# Patient Record
Sex: MALE | Race: WHITE | NOT HISPANIC OR LATINO | Employment: OTHER | URBAN - METROPOLITAN AREA
[De-identification: names, ages, dates, MRNs, and addresses within clinical notes are randomized per-mention and may not be internally consistent; named-entity substitution may affect disease eponyms.]

---

## 2017-06-30 ENCOUNTER — ALLSCRIPTS OFFICE VISIT (OUTPATIENT)
Dept: OTHER | Facility: OTHER | Age: 52
End: 2017-06-30

## 2017-09-16 ENCOUNTER — GENERIC CONVERSION - ENCOUNTER (OUTPATIENT)
Dept: OTHER | Facility: OTHER | Age: 52
End: 2017-09-16

## 2017-09-16 DIAGNOSIS — I10 ESSENTIAL (PRIMARY) HYPERTENSION: ICD-10-CM

## 2017-11-17 ENCOUNTER — GENERIC CONVERSION - ENCOUNTER (OUTPATIENT)
Dept: OTHER | Facility: OTHER | Age: 52
End: 2017-11-17

## 2017-11-17 DIAGNOSIS — Z12.5 ENCOUNTER FOR SCREENING FOR MALIGNANT NEOPLASM OF PROSTATE: ICD-10-CM

## 2018-01-13 VITALS
BODY MASS INDEX: 30.7 KG/M2 | OXYGEN SATURATION: 97 % | TEMPERATURE: 98.3 F | DIASTOLIC BLOOD PRESSURE: 100 MMHG | HEIGHT: 66 IN | SYSTOLIC BLOOD PRESSURE: 126 MMHG | WEIGHT: 191 LBS | RESPIRATION RATE: 16 BRPM | HEART RATE: 63 BPM

## 2018-01-22 VITALS
HEIGHT: 66 IN | SYSTOLIC BLOOD PRESSURE: 132 MMHG | HEART RATE: 68 BPM | TEMPERATURE: 97.5 F | OXYGEN SATURATION: 97 % | WEIGHT: 189 LBS | DIASTOLIC BLOOD PRESSURE: 98 MMHG | RESPIRATION RATE: 16 BRPM | BODY MASS INDEX: 30.37 KG/M2

## 2018-01-22 VITALS
BODY MASS INDEX: 31.5 KG/M2 | OXYGEN SATURATION: 96 % | RESPIRATION RATE: 16 BRPM | WEIGHT: 196 LBS | SYSTOLIC BLOOD PRESSURE: 120 MMHG | HEIGHT: 66 IN | DIASTOLIC BLOOD PRESSURE: 82 MMHG | HEART RATE: 68 BPM | TEMPERATURE: 97.4 F

## 2018-01-26 ENCOUNTER — OFFICE VISIT (OUTPATIENT)
Dept: FAMILY MEDICINE CLINIC | Facility: CLINIC | Age: 53
End: 2018-01-26
Payer: COMMERCIAL

## 2018-01-26 VITALS
RESPIRATION RATE: 16 BRPM | TEMPERATURE: 98 F | SYSTOLIC BLOOD PRESSURE: 126 MMHG | WEIGHT: 198 LBS | DIASTOLIC BLOOD PRESSURE: 88 MMHG | OXYGEN SATURATION: 97 % | BODY MASS INDEX: 31.08 KG/M2 | HEART RATE: 64 BPM | HEIGHT: 67 IN

## 2018-01-26 DIAGNOSIS — S46.011A ROTATOR CUFF STRAIN, RIGHT, INITIAL ENCOUNTER: ICD-10-CM

## 2018-01-26 DIAGNOSIS — I10 BENIGN ESSENTIAL HTN: ICD-10-CM

## 2018-01-26 DIAGNOSIS — E78.5 DYSLIPIDEMIA: ICD-10-CM

## 2018-01-26 DIAGNOSIS — Z12.5 SCREENING FOR PROSTATE CANCER: Primary | ICD-10-CM

## 2018-01-26 PROBLEM — G43.019 INTRACTABLE MIGRAINE WITHOUT AURA AND WITHOUT STATUS MIGRAINOSUS: Status: ACTIVE | Noted: 2018-01-26

## 2018-01-26 PROCEDURE — 99213 OFFICE O/P EST LOW 20 MIN: CPT | Performed by: INTERNAL MEDICINE

## 2018-01-26 RX ORDER — MULTIVIT-MIN/IRON/FOLIC ACID/K 18-600-40
500 CAPSULE ORAL
COMMUNITY

## 2018-01-26 RX ORDER — LISINOPRIL 20 MG/1
1 TABLET ORAL DAILY
COMMUNITY
End: 2018-03-07 | Stop reason: SDUPTHER

## 2018-01-26 RX ORDER — AMLODIPINE BESYLATE 10 MG/1
1 TABLET ORAL DAILY
COMMUNITY
End: 2018-03-07 | Stop reason: SDUPTHER

## 2018-01-26 RX ORDER — MELOXICAM 15 MG/1
15 TABLET ORAL DAILY
Qty: 30 TABLET | Refills: 1 | Status: SHIPPED | OUTPATIENT
Start: 2018-01-26 | End: 2018-03-31 | Stop reason: SDUPTHER

## 2018-01-26 RX ORDER — ASPIRIN 325 MG
81 TABLET ORAL
COMMUNITY

## 2018-01-26 NOTE — PROGRESS NOTES
Assessment/Plan:         Diagnoses and all orders for this visit:    Screening for prostate cancer    Benign essential HTN    Dyslipidemia    Rotator cuff strain, right, initial encounter    Other orders  -     Cancel: PSA, total and free; Future  -     amLODIPine (NORVASC) 10 mg tablet; Take 1 tablet by mouth daily  -     aspirin 325 mg tablet; Take by mouth  -     lisinopril (ZESTRIL) 20 mg tablet; Take 1 tablet by mouth daily  -     Pediatric Multivitamins-Fl (MULTIVITAMINS/FL PO); Take by mouth  -     Ascorbic Acid (VITAMIN C) 500 MG CAPS; Take by mouth          Subjective:      Patient ID: Connor Pedraza is a 46 y o  male  Pt relates his shoulder hurts for years but it has escalated over the last 2 months  It even hurts when he is washing in the shower - it is his rt shoulder  Denies pain  It hurts with abduction/adduction        The following portions of the patient's history were reviewed and updated as appropriate: allergies, current medications, past family history, past medical history, past social history, past surgical history and problem list     Review of Systems   Constitutional: Negative  HENT: Negative  Respiratory: Negative  Cardiovascular: Negative  Gastrointestinal: Negative  Objective:     Physical Exam   Constitutional: He appears well-developed and well-nourished  HENT:   Head: Normocephalic and atraumatic  Neck: Normal range of motion  Neck supple  Musculoskeletal:   Able to abduct 90 degrees    Painful adduction +tend over bisceps tendon

## 2018-02-16 ENCOUNTER — APPOINTMENT (OUTPATIENT)
Dept: RADIOLOGY | Facility: CLINIC | Age: 53
End: 2018-02-16
Payer: COMMERCIAL

## 2018-02-16 ENCOUNTER — OFFICE VISIT (OUTPATIENT)
Dept: OBGYN CLINIC | Facility: CLINIC | Age: 53
End: 2018-02-16
Payer: COMMERCIAL

## 2018-02-16 VITALS
HEART RATE: 70 BPM | BODY MASS INDEX: 32.22 KG/M2 | SYSTOLIC BLOOD PRESSURE: 135 MMHG | HEIGHT: 66 IN | WEIGHT: 200.5 LBS | DIASTOLIC BLOOD PRESSURE: 90 MMHG

## 2018-02-16 DIAGNOSIS — M25.511 RIGHT SHOULDER PAIN, UNSPECIFIED CHRONICITY: ICD-10-CM

## 2018-02-16 DIAGNOSIS — M67.911 ROTATOR CUFF DISORDER, RIGHT: Primary | ICD-10-CM

## 2018-02-16 DIAGNOSIS — M54.12 CERVICAL RADICULITIS: ICD-10-CM

## 2018-02-16 DIAGNOSIS — M75.21 BICEPS TENDINITIS OF RIGHT SHOULDER: ICD-10-CM

## 2018-02-16 PROCEDURE — 99203 OFFICE O/P NEW LOW 30 MIN: CPT | Performed by: ORTHOPAEDIC SURGERY

## 2018-02-16 PROCEDURE — 20610 DRAIN/INJ JOINT/BURSA W/O US: CPT | Performed by: ORTHOPAEDIC SURGERY

## 2018-02-16 PROCEDURE — 73030 X-RAY EXAM OF SHOULDER: CPT

## 2018-02-16 RX ADMIN — METHYLPREDNISOLONE ACETATE 1 ML: 40 INJECTION, SUSPENSION INTRA-ARTICULAR; INTRALESIONAL; INTRAMUSCULAR; SOFT TISSUE at 10:39

## 2018-02-16 RX ADMIN — BUPIVACAINE HYDROCHLORIDE 2 ML: 2.5 INJECTION, SOLUTION INFILTRATION; PERINEURAL at 10:39

## 2018-02-16 RX ADMIN — LIDOCAINE HYDROCHLORIDE 2 ML: 10 INJECTION, SOLUTION INFILTRATION; PERINEURAL at 10:39

## 2018-02-16 NOTE — PROGRESS NOTES
HPI:  Patient is a 46y o  year old   Right hand dominant male who presents with chief complaint of Shoulder Pain  Patient complains of right shoulder pain  The symptoms began  approximately 1 year ago but they have been much worse over the last couple of months    Aggravating factors: no known event  Pain is located   Both anteriorly over the bicipital groove and lesser tuberosity and laterally over the greater tuberosity to a lesser degree  No AC joint tenderness  No posterior tenderness  No glenohumeral tenderness    Discomfort is described as aching and sharp/stabbing  Symptoms are exacerbated by overhead movements and lying on the shoulder  Evaluation to date:   Patient has seen his primary care physician who prescribed Mobic that helps a little    Therapy to date includes: avoidance of offending activity and prescription NSAIDS which are not very effective  Patient does also report a few week history of some pain radiating from the neck into the shoulder down the upper extremity into the right thumb  This is episodic and appears to be related to his sleeping position  He reports that he changed sleeping positions to accommodate a new dog      ROS:   General: No fever, no chills, no weight loss, no weight gain  HEENT:  No loss of hearing, no nose bleeds, no sore throat  Eyes:  No eye pain, no red eyes, no visual disturbance  Respiratory:  No cough, no shortness of breath, no wheezing  Cardiovascular:  No chest pain, no palpitations, no edema  GI: No abdominal pain, no nausea, no vomiting  Endocrine: No frequent urination, no excessive thirst  Urinary:  No dysuria, no hematuria, no incontinence  Musculoskeletal: see HPI   Skin:  No rash, no wounds  Neurological:  No dizziness, no headache, positive numbness  Psychiatric:  No difficulty concentrating, no depression, no suicide thoughts, no anxiety  Review of all other systems is negative    PMH:  Past Medical History:   Diagnosis Date    Hyperlipidemia     Hypertension     Migraines     Varicella        PSH:  Past Surgical History:   Procedure Laterality Date    COLONOSCOPY      [employer] 6/30/2017    HERNIA REPAIR      SHOULDER SURGERY Left        Medications:  Current Outpatient Prescriptions   Medication Sig Dispense Refill    amLODIPine (NORVASC) 10 mg tablet Take 1 tablet by mouth daily      Ascorbic Acid (VITAMIN C) 500 MG CAPS Take by mouth      aspirin 325 mg tablet Take by mouth      lisinopril (ZESTRIL) 20 mg tablet Take 1 tablet by mouth daily      meloxicam (MOBIC) 15 mg tablet Take 1 tablet by mouth daily 30 tablet 1    Pediatric Multivitamins-Fl (MULTIVITAMINS/FL PO) Take by mouth       No current facility-administered medications for this visit  Family History:  Family History   Problem Relation Age of Onset    Rheum arthritis Father     Hypertension Father     Hyperlipidemia Father     Breast cancer Sister     Other Son      Neuroblastoma    Hyperlipidemia Family     Hearing loss Mother        Social History:  Social History     Occupational History    Not on file  Social History Main Topics    Smoking status: Never Smoker    Smokeless tobacco: Never Used    Alcohol use No      Comment: occasional    Drug use: No    Sexual activity: Not on file       Physical Exam:  General :  Alert, cooperative, no distress, appears stated age  Blood pressure 135/90, pulse 70, height 5' 6" (1 676 m), weight 90 9 kg (200 lb 8 oz)  Head:  Normocephalic, without obvious abnormality, atraumatic   Eyes:  PERRL, conjunctiva/corneas clear, EOM's intact,   Ears: Both ears normal appearance, no hearing deficits      Nose: Nares normal, septum midline, mucosa normal, no drainage or sinus tenderness   Neck: Supple, symmetrical, trachea midline, no adenopathy, thyroid: not enlarged, symmetric, no tenderness/mass/nodules   Back:   Symmetric, no curvature, ROM normal, no CVA tenderness   Lungs:   Respirations unlabored   Chest Wall:  No tenderness or deformity   Heart:  Regular rate and rhythm   Extremities: Extremities normal, atraumatic, no cyanosis or edema   Pulses: 2+ and symmetric   Skin: Skin color, texture, turgor normal, no rashes or lesions   Neurologic: Normal     Right shoulder:     Patient has full active range of motion but it is very painful  Pain with abduction greater than 60  Pain with forward flexion greater than 90  Positive Breaux and impingement test   Positive speed's test   He has pain with abduction against resistance and discomfort with external rotation against resistance consistent with supraspinatus involvement  However he also has pain with internal rotation against resistant consistent with subscapularis  Involvement  Lift-off test was negative    load shift testing was negative  Apprehension test negative  Negative sulcus sign  No AC joint tenderness  No glenohumeral tenderness  Cervical spine exam exam reveals full range of motion  No vertebral point tenderness  No paraspinal spasm  Deep tendon reflexes symmetric    sensations intact to light touch in office but patient states he gets episodic paresthesias in the right thumb and down the right arm  He describes a  C6 type distribution  Shoulder motor exam is  5/5 Shoulder ER, 4/5 Abduction, 4/5 IR    Imaging Studies: Distal motor exam is 5/5 for elbow flexion extension and wrist flexion extension finger abduction and   The following imaging studies were reviewed in office today  My findings are noted  Right shoulder x-rays 02/16/2018   normal alignment  No fracture  No significant degenerative change  Assessment  Encounter Diagnoses   Name Primary?     Right shoulder pain, unspecified chronicity     Rotator cuff disorder, right Yes    Biceps tendinitis of right shoulder     Cervical radiculitis      Large joint arthrocentesis  Date/Time: 2/16/2018 10:39 AM  Timeout: Immediately prior to procedure a time out was called to verify the correct patient, procedure, equipment, support staff and site/side marked as required   Supporting Documentation  Indications: pain   Procedure Details  Location: shoulder - R subacromial bursa  Preparation: Patient was prepped and draped in the usual sterile fashion  Needle size: 25 G  Ultrasound guidance: no  Approach: posterolateral  Medications administered: 2 mL bupivacaine 0 25 %; 2 mL lidocaine 1 %; 1 mL methylPREDNISolone acetate 40 mg/mL    Patient tolerance: patient tolerated the procedure well with no immediate complications  Dressing:  Sterile dressing applied          Plan: We are going to start patient on a course of physical therapy  We gave him a Depo-Medrol injection today  He can continue with the Mobic on a p r n  Basis  He will follow up in 4 weeks and at that time we will determine if an MRI is appropriate

## 2018-02-18 RX ORDER — LIDOCAINE HYDROCHLORIDE 10 MG/ML
2 INJECTION, SOLUTION INFILTRATION; PERINEURAL
Status: COMPLETED | OUTPATIENT
Start: 2018-02-16 | End: 2018-02-16

## 2018-02-18 RX ORDER — METHYLPREDNISOLONE ACETATE 40 MG/ML
1 INJECTION, SUSPENSION INTRA-ARTICULAR; INTRALESIONAL; INTRAMUSCULAR; SOFT TISSUE
Status: COMPLETED | OUTPATIENT
Start: 2018-02-16 | End: 2018-02-16

## 2018-02-18 RX ORDER — BUPIVACAINE HYDROCHLORIDE 2.5 MG/ML
2 INJECTION, SOLUTION INFILTRATION; PERINEURAL
Status: COMPLETED | OUTPATIENT
Start: 2018-02-16 | End: 2018-02-16

## 2018-03-06 ENCOUNTER — APPOINTMENT (OUTPATIENT)
Dept: LAB | Facility: MEDICAL CENTER | Age: 53
End: 2018-03-06
Payer: COMMERCIAL

## 2018-03-06 DIAGNOSIS — Z12.5 ENCOUNTER FOR SCREENING FOR MALIGNANT NEOPLASM OF PROSTATE: ICD-10-CM

## 2018-03-06 DIAGNOSIS — I10 ESSENTIAL (PRIMARY) HYPERTENSION: ICD-10-CM

## 2018-03-06 LAB
ALBUMIN SERPL BCP-MCNC: 3.8 G/DL (ref 3.5–5)
ALP SERPL-CCNC: 101 U/L (ref 46–116)
ALT SERPL W P-5'-P-CCNC: 14 U/L (ref 12–78)
ANION GAP SERPL CALCULATED.3IONS-SCNC: 4 MMOL/L (ref 4–13)
AST SERPL W P-5'-P-CCNC: 23 U/L (ref 5–45)
BASOPHILS # BLD AUTO: 0.04 THOUSANDS/ΜL (ref 0–0.1)
BASOPHILS NFR BLD AUTO: 1 % (ref 0–1)
BILIRUB SERPL-MCNC: 0.54 MG/DL (ref 0.2–1)
BUN SERPL-MCNC: 22 MG/DL (ref 5–25)
CALCIUM SERPL-MCNC: 8.7 MG/DL (ref 8.3–10.1)
CHLORIDE SERPL-SCNC: 109 MMOL/L (ref 100–108)
CHOLEST SERPL-MCNC: 187 MG/DL (ref 50–200)
CO2 SERPL-SCNC: 28 MMOL/L (ref 21–32)
CREAT SERPL-MCNC: 1.32 MG/DL (ref 0.6–1.3)
EOSINOPHIL # BLD AUTO: 0.22 THOUSAND/ΜL (ref 0–0.61)
EOSINOPHIL NFR BLD AUTO: 5 % (ref 0–6)
ERYTHROCYTE [DISTWIDTH] IN BLOOD BY AUTOMATED COUNT: 13.5 % (ref 11.6–15.1)
GFR SERPL CREATININE-BSD FRML MDRD: 62 ML/MIN/1.73SQ M
GLUCOSE P FAST SERPL-MCNC: 75 MG/DL (ref 65–99)
HCT VFR BLD AUTO: 47.1 % (ref 36.5–49.3)
HDLC SERPL-MCNC: 44 MG/DL (ref 40–60)
HGB BLD-MCNC: 16.4 G/DL (ref 12–17)
LDLC SERPL CALC-MCNC: 119 MG/DL (ref 0–100)
LYMPHOCYTES # BLD AUTO: 1.26 THOUSANDS/ΜL (ref 0.6–4.47)
LYMPHOCYTES NFR BLD AUTO: 29 % (ref 14–44)
MCH RBC QN AUTO: 31.4 PG (ref 26.8–34.3)
MCHC RBC AUTO-ENTMCNC: 34.8 G/DL (ref 31.4–37.4)
MCV RBC AUTO: 90 FL (ref 82–98)
MONOCYTES # BLD AUTO: 0.51 THOUSAND/ΜL (ref 0.17–1.22)
MONOCYTES NFR BLD AUTO: 12 % (ref 4–12)
NEUTROPHILS # BLD AUTO: 2.27 THOUSANDS/ΜL (ref 1.85–7.62)
NEUTS SEG NFR BLD AUTO: 53 % (ref 43–75)
NRBC BLD AUTO-RTO: 0 /100 WBCS
PLATELET # BLD AUTO: 168 THOUSANDS/UL (ref 149–390)
PMV BLD AUTO: 12.1 FL (ref 8.9–12.7)
POTASSIUM SERPL-SCNC: 4.3 MMOL/L (ref 3.5–5.3)
PROT SERPL-MCNC: 7.5 G/DL (ref 6.4–8.2)
PSA SERPL-MCNC: 0.6 NG/ML (ref 0–4)
RBC # BLD AUTO: 5.23 MILLION/UL (ref 3.88–5.62)
SODIUM SERPL-SCNC: 141 MMOL/L (ref 136–145)
T4 FREE SERPL-MCNC: 0.96 NG/DL (ref 0.76–1.46)
TRIGL SERPL-MCNC: 120 MG/DL
TSH SERPL DL<=0.05 MIU/L-ACNC: 1.12 UIU/ML (ref 0.36–3.74)
WBC # BLD AUTO: 4.31 THOUSAND/UL (ref 4.31–10.16)

## 2018-03-06 PROCEDURE — 85025 COMPLETE CBC W/AUTO DIFF WBC: CPT

## 2018-03-06 PROCEDURE — 36415 COLL VENOUS BLD VENIPUNCTURE: CPT

## 2018-03-06 PROCEDURE — 80053 COMPREHEN METABOLIC PANEL: CPT

## 2018-03-06 PROCEDURE — 80061 LIPID PANEL: CPT

## 2018-03-06 PROCEDURE — 84439 ASSAY OF FREE THYROXINE: CPT

## 2018-03-06 PROCEDURE — G0103 PSA SCREENING: HCPCS

## 2018-03-06 PROCEDURE — 84443 ASSAY THYROID STIM HORMONE: CPT

## 2018-03-07 DIAGNOSIS — I10 HYPERTENSION, UNSPECIFIED TYPE: Primary | ICD-10-CM

## 2018-03-07 RX ORDER — LISINOPRIL 20 MG/1
TABLET ORAL
Qty: 90 TABLET | Refills: 1 | Status: SHIPPED | OUTPATIENT
Start: 2018-03-07 | End: 2018-05-08

## 2018-03-07 RX ORDER — AMLODIPINE BESYLATE 10 MG/1
TABLET ORAL
Qty: 90 TABLET | Refills: 1 | Status: SHIPPED | OUTPATIENT
Start: 2018-03-07 | End: 2018-07-25 | Stop reason: SDUPTHER

## 2018-03-08 ENCOUNTER — EVALUATION (OUTPATIENT)
Dept: PHYSICAL THERAPY | Facility: CLINIC | Age: 53
End: 2018-03-08
Payer: COMMERCIAL

## 2018-03-08 DIAGNOSIS — M75.21 BICEPS TENDINITIS OF RIGHT SHOULDER: ICD-10-CM

## 2018-03-08 DIAGNOSIS — M25.511 RIGHT SHOULDER PAIN, UNSPECIFIED CHRONICITY: Primary | ICD-10-CM

## 2018-03-08 DIAGNOSIS — M67.911 ROTATOR CUFF DISORDER, RIGHT: ICD-10-CM

## 2018-03-08 PROCEDURE — 97110 THERAPEUTIC EXERCISES: CPT | Performed by: PHYSICAL THERAPIST

## 2018-03-08 PROCEDURE — G8990 OTHER PT/OT CURRENT STATUS: HCPCS | Performed by: PHYSICAL THERAPIST

## 2018-03-08 PROCEDURE — 97162 PT EVAL MOD COMPLEX 30 MIN: CPT | Performed by: PHYSICAL THERAPIST

## 2018-03-08 PROCEDURE — G8991 OTHER PT/OT GOAL STATUS: HCPCS | Performed by: PHYSICAL THERAPIST

## 2018-03-08 NOTE — PROGRESS NOTES
PT Evaluation     Today's date: 3/8/2018  Patient name: Luke Pyle  : 1965  MRN: [id number]  Referring provider: Judah Prado MD  Dx:   Encounter Diagnosis     ICD-10-CM    1  Right shoulder pain, unspecified chronicity M25 511 Ambulatory referral to Physical Therapy   2  Rotator cuff disorder, right M67 911 Ambulatory referral to Physical Therapy   3  Biceps tendinitis of right shoulder M75 21 Ambulatory referral to Physical Therapy                  Assessment  Impairments: abnormal or restricted ROM, activity intolerance, impaired physical strength, lacks appropriate home exercise program and pain with function    Assessment details: Luke Pyle is a 46year old male presenting to physical therapy with chief complaints of R shoulder pain  No further referral appears necessary at this time  Patient demonsrPatient was provided a home exercise program and demonstrated an understanding of exercises  Patient was advised to stop performing home exercise program if symptoms increase or new complaints developed  Verbal understanding demonstrated regarding home exercise program instructions  Patient would benefit from skilled physical therapy services for prescribed exercises, manual interventions, neuromuscular re-education, education, and modalities as deemed appropriate to assist patient in achieving their maximum level of function  Understanding of Dx/Px/POC: good   Prognosis: good    Goals  STG  1  Decrease pain by at least two subjective ratings in 4 weeks  2  Increase ROM by at least 5 degrees in 4 weeks  LTG  1  Independent with HEP  2  In 8 weeks patient will be able to reach overhead without limitations  3  In 8 weeks patient will be able to lift a gallon of milk without limitations  4    In 8 weeks patient will be able to complete household chores without limitations      Plan  Patient would benefit from: skilled PT  Planned modality interventions: cryotherapy  Planned therapy interventions: flexibility, graded exercise, home exercise program, therapeutic exercise, strengthening, stretching, patient education, neuromuscular re-education, manual therapy, joint mobilization, IADL retraining, functional ROM exercises and activity modification  Frequency: 2x week  Duration in weeks: 8  Treatment plan discussed with: patient        Subjective Evaluation    History of Present Illness  Mechanism of injury: Patient reports that he has had R shoulder pain for years  He states that recently he has had difficulty reaching overhead  He also reports that if his dog bumps into his R shoulder it is also very intense  He reports that riding a bicycle is also painful  X-rays were taken of the R shoulder which he reports were negative  He states that orthopedics advised him to come to physical therapy first before going any other route  He did have an injection to the R shoulder which provided some relief for a couple of hours but then his pain returned  Denies crepitus  Patient is RHD  Pain is also reported when trying to sleep, if he rolls onto the affected extremity that will occasionally wake him  Pain  Current pain ratin  At best pain ratin  At worst pain ratin  Quality: knife-like    Hand dominance: right      Diagnostic Tests  X-ray: normal        Objective     Tenderness     Left Shoulder   Tenderness in the acromion and biceps tendon (proximal)       Active Range of Motion     Right Shoulder   Flexion: 130 degrees   Abduction: 80 degrees   External rotation BTH: C2     Additional Active Range of Motion Details   IR Behind the back motion is to the hip     Passive Range of Motion   Left Shoulder   Extension: with pain    Right Shoulder   Flexion: 130 degrees   Abduction: 90 degrees   External rotation 90°: 35 degrees   Internal rotation 90°: 15 degrees     Scapular Mobility     Right Shoulder   Scapular Dyskinesis: grade I    Strength/Myotome Testing     Right Shoulder Planes of Motion   Flexion: 4   Abduction: 3-   External rotation at 0°: 3-   Internal rotation at 0°: 4     Tests     Right Shoulder   Positive belly press, empty can, Hawkin's, painful arc, Andrew's and scapular assistance              Daily Treatment Diary     Manual  3 8            GH PROM             GH mobilizations grade III 5 mins                                                       Exercise Diary  3 8            Pulleys             RTC isometrics 5"x10            SL ER 20x            Scap punches              scap wall slides             Prone row, extension             TB IR/ER             Scap 5 (no robbery)             Posterior capsule stretch 10"x10                                                                                                                                                               Modalities

## 2018-03-14 ENCOUNTER — OFFICE VISIT (OUTPATIENT)
Dept: PHYSICAL THERAPY | Facility: CLINIC | Age: 53
End: 2018-03-14
Payer: COMMERCIAL

## 2018-03-14 DIAGNOSIS — M67.911 ROTATOR CUFF DISORDER, RIGHT: ICD-10-CM

## 2018-03-14 DIAGNOSIS — M75.21 BICEPS TENDINITIS OF RIGHT SHOULDER: ICD-10-CM

## 2018-03-14 DIAGNOSIS — M25.511 RIGHT SHOULDER PAIN, UNSPECIFIED CHRONICITY: Primary | ICD-10-CM

## 2018-03-14 PROCEDURE — 97112 NEUROMUSCULAR REEDUCATION: CPT

## 2018-03-14 PROCEDURE — 97110 THERAPEUTIC EXERCISES: CPT

## 2018-03-14 PROCEDURE — 97140 MANUAL THERAPY 1/> REGIONS: CPT

## 2018-03-14 NOTE — PROGRESS NOTES
Daily Note     Today's date: 3/14/2018  Patient name: Alina Kelley  : 1965  MRN: [id number]  Referring provider: Angie Saldana MD  Dx:   Encounter Diagnosis     ICD-10-CM    1  Right shoulder pain, unspecified chronicity M25 511    2  Rotator cuff disorder, right M67 911    3  Biceps tendinitis of right shoulder M75 21                   Subjective: Pt reports feeling worse after performing exercise at home upon arrival   Pt reports 4/10 R shoulder pain  Objective: See treatment diary below  Daily Treatment Diary     Manual  3 8 3/14           GH PROM  10 min           GH mobilizations grade III 5 mins                                                       Exercise Diary  3 8 3/14           Pulleys  3'           RTC isometrics 5"x10 5"x10           SL ER 20x x20           Scap punches   x20           scap wall slides  2x10           Prone row, extension  2x10           TB IR/ER  rtb x20           Scap 5 (no robbery)  rtb x20 ea           Posterior capsule stretch 10"x10 :10x10                                                                                                                                                              Modalities   3/14           CP R shoulder seated  x10 min                                             Assessment: PROM limited in all planes secondary to pain  Patient able to complete all exercise within pain free ROM today  Patient reports improvement in R shoulder symptoms post session and after ice  Pt instructed to perform all ex at home within pain free ROM  Plan: Continue per plan of care

## 2018-03-15 ENCOUNTER — OFFICE VISIT (OUTPATIENT)
Dept: PHYSICAL THERAPY | Facility: CLINIC | Age: 53
End: 2018-03-15
Payer: COMMERCIAL

## 2018-03-15 DIAGNOSIS — M75.21 BICEPS TENDINITIS OF RIGHT SHOULDER: ICD-10-CM

## 2018-03-15 DIAGNOSIS — M67.911 ROTATOR CUFF DISORDER, RIGHT: ICD-10-CM

## 2018-03-15 DIAGNOSIS — M25.511 RIGHT SHOULDER PAIN, UNSPECIFIED CHRONICITY: Primary | ICD-10-CM

## 2018-03-15 PROCEDURE — 97112 NEUROMUSCULAR REEDUCATION: CPT | Performed by: PHYSICAL THERAPIST

## 2018-03-15 PROCEDURE — 97140 MANUAL THERAPY 1/> REGIONS: CPT | Performed by: PHYSICAL THERAPIST

## 2018-03-15 NOTE — PROGRESS NOTES
Daily Note     Today's date: 3/15/2018  Patient name: Reny Ahuja  : 1965  MRN: [id number]  Referring provider: Verenice Massey MD  Dx:   Encounter Diagnosis     ICD-10-CM    1  Right shoulder pain, unspecified chronicity M25 511    2  Rotator cuff disorder, right M67 911    3  Biceps tendinitis of right shoulder M75 21                   Subjective: Reports compliance with his HEP thus far  some mild soreness today      Objective: See treatment diary below  Daily Treatment Diary     Manual  3 8 3/14 3 15          GH PROM  10 min 8 min          GH mobilizations grade III 5 mins  2 min                                                     Exercise Diary  3 8 3/14 3 15          Pulleys  3' 4 min          RTC isometrics 5"x10 5"x10 5"x10ea          SL ER 20x x20 20x          Scap punches   x20 20x 2#          scap wall slides  2x10 30x          Prone row, extension  2x10 2x10 2#          TB IR/ER  rtb x20 20x          Scap 5 (no robbery)  rtb x20 ea 20ea          Posterior capsule stretch 10"x10 :10x10 10"x10          Supine cane flex/ ER   10"x10          LT ER   rtb 2x10                                                                                                                                   Modalities   3/14           CP R shoulder seated  x10 min                                             Assessment: ER based motions are most painful for the patient  Challenged with activities going outside the plane of the scapula  Resistance added today without complaints of increased pain  ROM remains limited in all planes becaues of pain  Plan: Continue per plan of care

## 2018-03-16 ENCOUNTER — OFFICE VISIT (OUTPATIENT)
Dept: OBGYN CLINIC | Facility: CLINIC | Age: 53
End: 2018-03-16
Payer: COMMERCIAL

## 2018-03-16 VITALS
DIASTOLIC BLOOD PRESSURE: 87 MMHG | BODY MASS INDEX: 31.5 KG/M2 | SYSTOLIC BLOOD PRESSURE: 118 MMHG | HEART RATE: 66 BPM | WEIGHT: 196 LBS | HEIGHT: 66 IN

## 2018-03-16 DIAGNOSIS — M75.41 IMPINGEMENT SYNDROME OF RIGHT SHOULDER: ICD-10-CM

## 2018-03-16 DIAGNOSIS — M67.911 ROTATOR CUFF DISORDER, RIGHT: Primary | ICD-10-CM

## 2018-03-16 DIAGNOSIS — M77.8 TENDINITIS OF RIGHT SHOULDER: ICD-10-CM

## 2018-03-16 PROCEDURE — 99214 OFFICE O/P EST MOD 30 MIN: CPT | Performed by: ORTHOPAEDIC SURGERY

## 2018-03-16 NOTE — PROGRESS NOTES
Chief Complaint   Patient presents with    Right Shoulder - Follow-up         Subjective   Patient enters today follow-up right shoulder pain  Patient has had shoulder symptoms for over one year which progressively became worse over the last few months  Patient had cortisone injection last visit and has been attending physical therapy with no real improvement  Patient still has pain with overhead activities  He has decreased range of motion  Denies any numbness or tingling in the upper extremity  Patient complains of most of his pain in the anterior aspect of the shoulder and at times the superior posterior aspect of the shoulder  ROS:   General: no fever, no chills  Respiratory:  No coughing, shortness of breath or wheezing  Cardiovascular:  No chest pain, no palpitations  GI:  Abdomen soft nontender, denies nausea  Endocrine:  No muscle weakness, no frequent urination, no excessive thirst  Urinary:  No dysuria, no incontinence  Musculoskeletal: see HPI and PE  SKIN:  No skin rash, no dry skin  Neurological:  No headaches, no confusion  Psychiatric:  No suicide thoughts, no anxiety, no depression  Review of all other systems is negative    Past Medical History:   Diagnosis Date    Hyperlipidemia     Hypertension     Migraines     Varicella        Current Outpatient Prescriptions on File Prior to Visit   Medication Sig Dispense Refill    amLODIPine (NORVASC) 10 mg tablet TAKE 1 TABLET DAILY AS DIRECTED  90 tablet 1    Ascorbic Acid (VITAMIN C) 500 MG CAPS Take by mouth      aspirin 325 mg tablet Take by mouth      lisinopril (ZESTRIL) 20 mg tablet TAKE 1 TABLET DAILY 90 tablet 1    meloxicam (MOBIC) 15 mg tablet Take 1 tablet by mouth daily 30 tablet 1    Pediatric Multivitamins-Fl (MULTIVITAMINS/FL PO) Take by mouth       No current facility-administered medications on file prior to visit          No Known Allergies    Social History       General Appearance:  Alert, cooperative, no distress, appears stated age   Lungs:   respirations unlabored   Heart:  Regular rate and rhythm   Abdomen:   Soft, non-tender,  no masses   Extremities: Extremities normal, atraumatic, no cyanosis or edema   Pulses: 2+ and symmetric   Skin: Skin color, texture, turgor normal, no rashes or lesions   Neurologic: Normal     Ortho Exam   Right shoulder examination shows skin intact with no erythema noted  No visible deformities seen  Tenderness noted over the anterior aspect of the shoulder as well as the greater tuberosity  No tenderness noted over the [facility] [place] joint  No tenderness noted with palpation over the greater tuberosity  Active forward elevation to 125 degrees with pain beyond that, passive ROM is full  Active external rotation is to 60degrees and internal rotation to level of T11  Pain with resisted supraspinatus, positive speed's, positive Neer and Breaux impingement  Sensation is intact to light touch C5- T1 distributions  5/5 strength with shoulder flexion, external and internal rotation, biceps and triceps  Praful Josefsherri was seen today for follow-up  Diagnoses and all orders for this visit:    Rotator cuff disorder, right  -     MRI shoulder right wo contrast; Future    Impingement syndrome of right shoulder    Tendinitis of right shoulder          PLAN:  Patient with right shoulder pain for over one year gradually becoming worse  Patient not responding to conservative treatment of NSAIDs, cortisone injection and physical therapy  We will send patient to get right shoulder MRI to better evaluate the rotator cuff    He will follow up after the MRI to discuss findings and treatment options based on those findings

## 2018-03-20 ENCOUNTER — TRANSCRIBE ORDERS (OUTPATIENT)
Dept: LAB | Facility: CLINIC | Age: 53
End: 2018-03-20

## 2018-03-22 ENCOUNTER — HOSPITAL ENCOUNTER (OUTPATIENT)
Dept: MRI IMAGING | Facility: CLINIC | Age: 53
Discharge: HOME | End: 2018-03-22
Payer: COMMERCIAL

## 2018-03-22 DIAGNOSIS — M67.911 ROTATOR CUFF DISORDER, RIGHT: ICD-10-CM

## 2018-03-22 PROCEDURE — 73221 MRI JOINT UPR EXTREM W/O DYE: CPT

## 2018-03-28 ENCOUNTER — OFFICE VISIT (OUTPATIENT)
Dept: OBGYN CLINIC | Facility: CLINIC | Age: 53
End: 2018-03-28
Payer: COMMERCIAL

## 2018-03-28 VITALS
WEIGHT: 194.4 LBS | HEART RATE: 62 BPM | BODY MASS INDEX: 30.51 KG/M2 | SYSTOLIC BLOOD PRESSURE: 152 MMHG | HEIGHT: 67 IN | DIASTOLIC BLOOD PRESSURE: 108 MMHG

## 2018-03-28 DIAGNOSIS — S43.431A SUPERIOR GLENOID LABRUM LESION OF RIGHT SHOULDER, INITIAL ENCOUNTER: ICD-10-CM

## 2018-03-28 DIAGNOSIS — M25.511 RIGHT SHOULDER PAIN, UNSPECIFIED CHRONICITY: Primary | ICD-10-CM

## 2018-03-28 PROCEDURE — 99213 OFFICE O/P EST LOW 20 MIN: CPT | Performed by: ORTHOPAEDIC SURGERY

## 2018-03-28 NOTE — PROGRESS NOTES
Chief Complaint   Patient presents with    Right Shoulder - Follow-up         Subjective     Patient's symptoms are unchanged  He continues to have pain with overhead activity and with reaching forward  He had the MRI scan of his right shoulder performed on  March 22, 2018  This was reviewed in office today  There is no obvious rotator cuff tear  There is a possible slap tear  ROS:   General: no fever, no chills  HEENT:  No loss of hearing or eyesight problems  Eyes:  No red eyes  Respiratory:  No coughing, shortness of breath or wheezing  Cardiovascular:  No chest pain, no palpitations  GI:  Abdomen soft nontender, denies nausea  Endocrine:  No muscle weakness, no frequent urination, no excessive thirst  Urinary:  No dysuria, no incontinence  Musculoskeletal: see HPI and PE  SKIN:  No skin rash, no dry skin  Neurological:  No headaches, no confusion  Psychiatric:  No suicide thoughts, no anxiety, no depression  Review of all other systems is negative    Past Medical History:   Diagnosis Date    Hyperlipidemia     Hypertension     Migraines     Varicella        Current Outpatient Prescriptions on File Prior to Visit   Medication Sig Dispense Refill    amLODIPine (NORVASC) 10 mg tablet TAKE 1 TABLET DAILY AS DIRECTED  90 tablet 1    Ascorbic Acid (VITAMIN C) 500 MG CAPS Take by mouth      aspirin 325 mg tablet Take by mouth      lisinopril (ZESTRIL) 20 mg tablet TAKE 1 TABLET DAILY 90 tablet 1    meloxicam (MOBIC) 15 mg tablet Take 1 tablet by mouth daily 30 tablet 1    Pediatric Multivitamins-Fl (MULTIVITAMINS/FL PO) Take by mouth       No current facility-administered medications on file prior to visit          No Known Allergies    Social History       General Appearance:  Alert, cooperative, no distress, appears stated age   Lungs:   respirations unlabored   Heart:  Regular rate and rhythm   Abdomen:   Soft, non-tender,  no masses   Extremities: Extremities normal, atraumatic, no cyanosis or edema   Pulses: 2+ and symmetric   Skin: Skin color, texture, turgor normal, no rashes or lesions   Neurologic: Normal         Right Shoulder Exam     Tenderness   The patient is experiencing tenderness in the   Posterior and lateral shoulder  Range of Motion   Active Abduction:                       Normal  Passive Abduction:                    Normal  Extension:                                  Normal  Forward Flexion:                        180  External Rotation:                      90  Internal Rotation 0 degrees:      Lumbar    Muscle Strength   Abduction:            4/5  Internal Rotation:  4/5  External Rotation: 4/5  Supraspinatus:     4/5  Subscapularis:     4/5  Biceps:                 4/5    Tests   Impingement:   Positive  Cross Arm:      Negative  Drop Arm:        Negative  Apprehension: Negative  Sulcus:            Positive      Positive Speeds test  Positive Yergson's test       ASSESSMENT:    Tita Gunter was seen today for follow-up  Diagnoses and all orders for this visit:    Right shoulder pain, unspecified chronicity  -     Case request operating room: ARTHROSCOPY SHOULDER, TENODESIS BICEPS OPEN PROXIMAL, ACROMIOPLASTY, REPAIR LABRUM; Standing  -     Case request operating room: ARTHROSCOPY SHOULDER, TENODESIS BICEPS OPEN PROXIMAL, ACROMIOPLASTY, REPAIR LABRUM    Superior glenoid labrum lesion of right shoulder, initial encounter  -     Case request operating room: ARTHROSCOPY SHOULDER, TENODESIS BICEPS OPEN PROXIMAL, ACROMIOPLASTY, REPAIR LABRUM; Standing  -     Case request operating room: ARTHROSCOPY SHOULDER, TENODESIS BICEPS OPEN PROXIMAL, ACROMIOPLASTY, REPAIR LABRUM    Other orders  -     Height and weight upon arrival; Standing  -     Void on call to OR; Standing  -     Insert peripheral IV; Standing  -     ceFAZolin (ANCEF) 1,000 mg in dextrose 5 % 100 mL IVPB;  Infuse 1,000 mg into a venous catheter once           PLAN:      Diagnostic arthroscopy and possible open subpectoral biceps tenodesis if significant slap tear is noted  We will also consider labral repair and acromioplasty  I doubt any significant rotator cuff tear will be found, but if one is we will consider repair  Risks and benefits of these procedures were discussed in detail with the patient and he wished to proceed

## 2018-03-29 DIAGNOSIS — S43.431A SUPERIOR GLENOID LABRUM LESION OF RIGHT SHOULDER, INITIAL ENCOUNTER: Primary | ICD-10-CM

## 2018-03-31 DIAGNOSIS — S46.011A ROTATOR CUFF STRAIN, RIGHT, INITIAL ENCOUNTER: ICD-10-CM

## 2018-04-02 RX ORDER — MELOXICAM 15 MG/1
15 TABLET ORAL DAILY
Qty: 30 TABLET | Refills: 1 | Status: SHIPPED | OUTPATIENT
Start: 2018-04-02 | End: 2018-05-08

## 2018-04-02 NOTE — PRE-PROCEDURE INSTRUCTIONS
Pre-Surgery Instructions:   Medication Instructions    amLODIPine (NORVASC) 10 mg tablet Patient was instructed by Physician and understands   Ascorbic Acid (VITAMIN C) 500 MG CAPS Patient was instructed by Physician and understands   aspirin 325 mg tablet Patient was instructed by Physician and understands   lisinopril (ZESTRIL) 20 mg tablet Patient was instructed by Physician and understands   meloxicam (MOBIC) 15 mg tablet Patient was instructed by Physician and understands

## 2018-04-06 ENCOUNTER — OFFICE VISIT (OUTPATIENT)
Dept: FAMILY MEDICINE CLINIC | Facility: CLINIC | Age: 53
End: 2018-04-06
Payer: COMMERCIAL

## 2018-04-06 VITALS
HEART RATE: 73 BPM | SYSTOLIC BLOOD PRESSURE: 132 MMHG | OXYGEN SATURATION: 98 % | WEIGHT: 194 LBS | TEMPERATURE: 98.5 F | DIASTOLIC BLOOD PRESSURE: 100 MMHG | HEIGHT: 67 IN | BODY MASS INDEX: 30.45 KG/M2 | RESPIRATION RATE: 16 BRPM

## 2018-04-06 DIAGNOSIS — I10 HYPERTENSION, UNSPECIFIED TYPE: ICD-10-CM

## 2018-04-06 DIAGNOSIS — Z01.818 PRE-OP EXAM: Primary | ICD-10-CM

## 2018-04-06 PROCEDURE — 99243 OFF/OP CNSLTJ NEW/EST LOW 30: CPT | Performed by: INTERNAL MEDICINE

## 2018-04-06 PROCEDURE — 93000 ELECTROCARDIOGRAM COMPLETE: CPT | Performed by: INTERNAL MEDICINE

## 2018-04-06 RX ORDER — METOPROLOL SUCCINATE 50 MG/1
50 TABLET, EXTENDED RELEASE ORAL DAILY
Qty: 30 TABLET | Refills: 1 | Status: SHIPPED | OUTPATIENT
Start: 2018-04-06 | End: 2018-06-03 | Stop reason: SDUPTHER

## 2018-04-06 NOTE — PATIENT INSTRUCTIONS
His bp is not controlled  Will add b-blocker for bp control and card protection for or  Will recheck bp before or  He will take bp at night and other bp meds in am   ekg lico  He is to have preop lab  ADDENDUM:  Reviewed his lab - lico  Told him no aspirin/nsaid/ginkoba/gingsing/vit e or fish oil one week before  Asked him to take amlodipine 10mg, lisinopril 20mg and metoprolol xl 50mg am of surgery with sip of water  Repeat bp is 130/88 on new meds    He is cleared for surgery

## 2018-04-06 NOTE — PROGRESS NOTES
Assessment/Plan:         Diagnoses and all orders for this visit:    Pre-op exam  -     POCT ECG          Subjective:      Patient ID: Katalina Jean is a 46 y o  male  For surgery 4/26/18 - dr Arturo Blanca - for repair of labrium +htn - not controlled will add b-blocker and recheck before surgery  Denies dm, arrythmia, problem with gen anesthesia        The following portions of the patient's history were reviewed and updated as appropriate:   He  has a past medical history of Hyperlipidemia; Hypertension; Migraines; and Varicella  He   Patient Active Problem List    Diagnosis Date Noted    Superior glenoid labrum lesion of right shoulder 03/28/2018    Right shoulder pain 03/28/2018    Rotator cuff disorder, right 03/16/2018    Impingement syndrome of right shoulder 03/16/2018    Dyslipidemia 01/26/2018    Intractable migraine without aura and without status migrainosus 01/26/2018    Benign essential HTN 06/30/2017     He  has a past surgical history that includes Colonoscopy; Hernia repair; and Shoulder surgery (Left)  His family history includes Breast cancer in his sister; Hearing loss in his mother; Hyperlipidemia in his family and father; Hypertension in his father; Other in his son; Rheum arthritis in his father  He  reports that he has never smoked  He has never used smokeless tobacco  He reports that he drinks alcohol  He reports that he does not use drugs  Current Outpatient Prescriptions   Medication Sig Dispense Refill    amLODIPine (NORVASC) 10 mg tablet TAKE 1 TABLET DAILY AS DIRECTED  90 tablet 1    Ascorbic Acid (VITAMIN C) 500 MG CAPS Take 500 mg by mouth        aspirin 325 mg tablet Take by mouth      lisinopril (ZESTRIL) 20 mg tablet TAKE 1 TABLET DAILY 90 tablet 1    Pediatric Multivitamins-Fl (MULTIVITAMINS/FL PO) Take by mouth      meloxicam (MOBIC) 15 mg tablet TAKE 1 TABLET BY MOUTH DAILY 30 tablet 1     No current facility-administered medications for this visit        Current Outpatient Prescriptions on File Prior to Visit   Medication Sig    amLODIPine (NORVASC) 10 mg tablet TAKE 1 TABLET DAILY AS DIRECTED   Ascorbic Acid (VITAMIN C) 500 MG CAPS Take 500 mg by mouth      aspirin 325 mg tablet Take by mouth    lisinopril (ZESTRIL) 20 mg tablet TAKE 1 TABLET DAILY    Pediatric Multivitamins-Fl (MULTIVITAMINS/FL PO) Take by mouth    meloxicam (MOBIC) 15 mg tablet TAKE 1 TABLET BY MOUTH DAILY     No current facility-administered medications on file prior to visit  He has No Known Allergies       Review of Systems   Constitutional: Negative  HENT: Negative  Respiratory: Negative  Cardiovascular: Negative  Neurological: Positive for headaches  Objective:      /100 (BP Location: Left arm, Patient Position: Sitting, Cuff Size: Large)   Pulse 73   Temp 98 5 °F (36 9 °C) (Tympanic)   Resp 16   Ht 5' 7" (1 702 m)   Wt 88 kg (194 lb)   SpO2 98%   BMI 30 38 kg/m²          Physical Exam   Constitutional: He appears well-developed and well-nourished  HENT:   Head: Normocephalic and atraumatic  Neck: Normal range of motion  Neck supple  Cardiovascular: Normal rate and regular rhythm      Pulmonary/Chest: Effort normal and breath sounds normal

## 2018-04-12 ENCOUNTER — APPOINTMENT (OUTPATIENT)
Dept: LAB | Facility: HOSPITAL | Age: 53
End: 2018-04-12
Attending: ORTHOPAEDIC SURGERY
Payer: COMMERCIAL

## 2018-04-12 ENCOUNTER — HOSPITAL ENCOUNTER (OUTPATIENT)
Dept: RADIOLOGY | Facility: HOSPITAL | Age: 53
Discharge: HOME | End: 2018-04-12
Attending: ORTHOPAEDIC SURGERY
Payer: COMMERCIAL

## 2018-04-12 DIAGNOSIS — S43.431A SUPERIOR GLENOID LABRUM LESION OF RIGHT SHOULDER, INITIAL ENCOUNTER: ICD-10-CM

## 2018-04-12 LAB
ANION GAP SERPL CALCULATED.3IONS-SCNC: 8 MMOL/L (ref 4–13)
BASOPHILS # BLD AUTO: 0.05 THOUSANDS/ΜL (ref 0–0.1)
BASOPHILS NFR BLD AUTO: 1 % (ref 0–1)
BUN SERPL-MCNC: 21 MG/DL (ref 5–25)
CALCIUM SERPL-MCNC: 8.7 MG/DL
CHLORIDE SERPL-SCNC: 105 MMOL/L (ref 100–108)
CO2 SERPL-SCNC: 28 MMOL/L (ref 21–32)
CREAT SERPL-MCNC: 1.37 MG/DL (ref 0.6–1.3)
EOSINOPHIL # BLD AUTO: 0.16 THOUSAND/ΜL (ref 0–0.61)
EOSINOPHIL NFR BLD AUTO: 3 % (ref 0–6)
ERYTHROCYTE [DISTWIDTH] IN BLOOD BY AUTOMATED COUNT: 12.4 % (ref 11.6–15.1)
GFR SERPL CREATININE-BSD FRML MDRD: 59 ML/MIN/1.73SQ M
GLUCOSE P FAST SERPL-MCNC: 93 MG/DL (ref 65–99)
HCT VFR BLD AUTO: 44 % (ref 36.5–49.3)
HGB BLD-MCNC: 15.2 G/DL (ref 12–17)
INR PPP: 0.99 (ref 0.86–1.16)
LYMPHOCYTES # BLD AUTO: 1.68 THOUSANDS/ΜL (ref 0.6–4.47)
LYMPHOCYTES NFR BLD AUTO: 27 % (ref 14–44)
MCH RBC QN AUTO: 30.8 PG (ref 26.8–34.3)
MCHC RBC AUTO-ENTMCNC: 34.5 G/DL (ref 31.4–37.4)
MCV RBC AUTO: 89 FL (ref 82–98)
MONOCYTES # BLD AUTO: 0.8 THOUSAND/ΜL (ref 0.17–1.22)
MONOCYTES NFR BLD AUTO: 13 % (ref 4–12)
NEUTROPHILS # BLD AUTO: 3.53 THOUSANDS/ΜL (ref 1.85–7.62)
NEUTS SEG NFR BLD AUTO: 57 % (ref 43–75)
NRBC BLD AUTO-RTO: 0 /100 WBCS
PLATELET # BLD AUTO: 179 THOUSANDS/UL (ref 149–390)
PMV BLD AUTO: 10.5 FL (ref 8.9–12.7)
POTASSIUM SERPL-SCNC: 3.7 MMOL/L (ref 3.5–5.3)
PROTHROMBIN TIME: 13.3 SECONDS (ref 12.1–14.4)
RBC # BLD AUTO: 4.94 MILLION/UL (ref 3.88–5.62)
SODIUM SERPL-SCNC: 141 MMOL/L (ref 136–145)
WBC # BLD AUTO: 6.24 THOUSAND/UL (ref 4.31–10.16)

## 2018-04-12 PROCEDURE — 85610 PROTHROMBIN TIME: CPT

## 2018-04-12 PROCEDURE — 71046 X-RAY EXAM CHEST 2 VIEWS: CPT

## 2018-04-12 PROCEDURE — 80048 BASIC METABOLIC PNL TOTAL CA: CPT

## 2018-04-12 PROCEDURE — 36415 COLL VENOUS BLD VENIPUNCTURE: CPT

## 2018-04-12 PROCEDURE — 85025 COMPLETE CBC W/AUTO DIFF WBC: CPT

## 2018-04-17 ENCOUNTER — OFFICE VISIT (OUTPATIENT)
Dept: FAMILY MEDICINE CLINIC | Facility: CLINIC | Age: 53
End: 2018-04-17
Payer: COMMERCIAL

## 2018-04-17 VITALS
DIASTOLIC BLOOD PRESSURE: 88 MMHG | SYSTOLIC BLOOD PRESSURE: 130 MMHG | TEMPERATURE: 97.5 F | RESPIRATION RATE: 16 BRPM | OXYGEN SATURATION: 97 % | BODY MASS INDEX: 30.92 KG/M2 | HEIGHT: 67 IN | HEART RATE: 61 BPM | WEIGHT: 197 LBS

## 2018-04-17 DIAGNOSIS — I10 BENIGN ESSENTIAL HTN: Primary | ICD-10-CM

## 2018-04-17 PROCEDURE — 99213 OFFICE O/P EST LOW 20 MIN: CPT | Performed by: INTERNAL MEDICINE

## 2018-04-17 NOTE — PROGRESS NOTES
Assessment/Plan:         There are no diagnoses linked to this encounter  Subjective:      Patient ID: Narendra Cespedes is a 46 y o  male  Pt returns for bp check prior to or  His bp has improved  Denies cp/sob/h/a        The following portions of the patient's history were reviewed and updated as appropriate:   He  has a past medical history of Hyperlipidemia; Hypertension; Migraines; and Varicella  He   Patient Active Problem List    Diagnosis Date Noted    Superior glenoid labrum lesion of right shoulder 03/28/2018    Right shoulder pain 03/28/2018    Rotator cuff disorder, right 03/16/2018    Impingement syndrome of right shoulder 03/16/2018    Dyslipidemia 01/26/2018    Intractable migraine without aura and without status migrainosus 01/26/2018    Benign essential HTN 06/30/2017     He  has a past surgical history that includes Colonoscopy; Hernia repair; and Shoulder surgery (Left)  His family history includes Breast cancer in his sister; Hearing loss in his mother; Hyperlipidemia in his family and father; Hypertension in his father; Other in his son; Rheum arthritis in his father  He  reports that he has never smoked  He has never used smokeless tobacco  He reports that he drinks alcohol  He reports that he does not use drugs  Current Outpatient Prescriptions   Medication Sig Dispense Refill    amLODIPine (NORVASC) 10 mg tablet TAKE 1 TABLET DAILY AS DIRECTED  90 tablet 1    Ascorbic Acid (VITAMIN C) 500 MG CAPS Take 500 mg by mouth        aspirin 325 mg tablet Take by mouth      lisinopril (ZESTRIL) 20 mg tablet TAKE 1 TABLET DAILY 90 tablet 1    meloxicam (MOBIC) 15 mg tablet TAKE 1 TABLET BY MOUTH DAILY 30 tablet 1    metoprolol succinate (TOPROL-XL) 50 mg 24 hr tablet Take 1 tablet (50 mg total) by mouth daily 30 tablet 1    Pediatric Multivitamins-Fl (MULTIVITAMINS/FL PO) Take by mouth       No current facility-administered medications for this visit        Current Outpatient Prescriptions on File Prior to Visit   Medication Sig    amLODIPine (NORVASC) 10 mg tablet TAKE 1 TABLET DAILY AS DIRECTED   Ascorbic Acid (VITAMIN C) 500 MG CAPS Take 500 mg by mouth      aspirin 325 mg tablet Take by mouth    lisinopril (ZESTRIL) 20 mg tablet TAKE 1 TABLET DAILY    meloxicam (MOBIC) 15 mg tablet TAKE 1 TABLET BY MOUTH DAILY    metoprolol succinate (TOPROL-XL) 50 mg 24 hr tablet Take 1 tablet (50 mg total) by mouth daily    Pediatric Multivitamins-Fl (MULTIVITAMINS/FL PO) Take by mouth     No current facility-administered medications on file prior to visit  He has No Known Allergies       Review of Systems   Constitutional: Negative  HENT: Negative  Respiratory: Negative  Cardiovascular: Negative  Objective:      /88 (BP Location: Left arm, Patient Position: Sitting, Cuff Size: Large)   Pulse 61   Temp 97 5 °F (36 4 °C) (Tympanic)   Resp 16   Ht 5' 7" (1 702 m)   Wt 89 4 kg (197 lb)   SpO2 97%   BMI 30 85 kg/m²          Physical Exam   Constitutional: He appears well-developed and well-nourished  HENT:   Head: Normocephalic and atraumatic  Neck: Normal range of motion  Neck supple  Cardiovascular: Normal rate and regular rhythm      Pulmonary/Chest: Effort normal and breath sounds normal

## 2018-04-19 NOTE — PROGRESS NOTES
PT Discharge    Today's date: 2018  Patient name: Oleg Ordoñez  : 1965  MRN: [id number]  Referring provider: April Christopher MD  Dx:   Encounter Diagnosis     ICD-10-CM    1  Right shoulder pain, unspecified chronicity M25 511    2  Rotator cuff disorder, right M67 911    3  Biceps tendinitis of right shoulder M75 21        Start Time: 1700  Stop Time: 1759  Total time in clinic (min): 59 minutes    Assessment  Impairments: abnormal or restricted ROM, activity intolerance, impaired physical strength, lacks appropriate home exercise program and pain with function    Assessment details: Patient stopped coming to physical therapy, measurements are from the evaluation and were unable to be updated  As a result, patient is discharged from physical therapy  Understanding of Dx/Px/POC: good   Prognosis: good    Goals  STG  1  Decrease pain by at least two subjective ratings in 4 weeks - not met  2  Increase ROM by at least 5 degrees in 4 weeks - not met  LTG  1  Independent with HEP - not met  2  In 8 weeks patient will be able to reach overhead without limitations - not met  3  In 8 weeks patient will be able to lift a gallon of milk without limitations - not met  4    In 8 weeks patient will be able to complete household chores without limitations - not met      Plan  Planned modality interventions: cryotherapy  Planned therapy interventions: flexibility, graded exercise, home exercise program, therapeutic exercise, strengthening, stretching, patient education, neuromuscular re-education, manual therapy, joint mobilization, IADL retraining, functional ROM exercises and activity modification  Treatment plan discussed with: patient  Plan details: DC from PT        Subjective Evaluation    Pain  Current pain ratin  At best pain ratin  At worst pain ratin  Quality: knife-like    Hand dominance: right      Diagnostic Tests  X-ray: normal        Objective     Tenderness     Left Shoulder Tenderness in the acromion and biceps tendon (proximal)  Active Range of Motion     Right Shoulder   Flexion: 130 degrees   Abduction: 80 degrees   External rotation BTH: C2     Additional Active Range of Motion Details   IR Behind the back motion is to the hip     Passive Range of Motion   Left Shoulder   Extension: with pain    Right Shoulder   Flexion: 130 degrees   Abduction: 90 degrees   External rotation 90°: 35 degrees   Internal rotation 90°: 15 degrees     Scapular Mobility     Right Shoulder   Scapular Dyskinesis: grade I    Strength/Myotome Testing     Right Shoulder     Planes of Motion   Flexion: 4   Abduction: 3-   External rotation at 0°: 3-   Internal rotation at 0°: 4     Tests     Right Shoulder   Positive belly press, empty can, Hawkin's, painful arc, Andrew's and scapular assistance              Daily Treatment Diary

## 2018-04-25 ENCOUNTER — ANESTHESIA EVENT (OUTPATIENT)
Dept: PERIOP | Facility: HOSPITAL | Age: 53
End: 2018-04-25
Payer: COMMERCIAL

## 2018-04-26 ENCOUNTER — HOSPITAL ENCOUNTER (OUTPATIENT)
Facility: HOSPITAL | Age: 53
Setting detail: OUTPATIENT SURGERY
Discharge: HOME | End: 2018-04-26
Attending: ORTHOPAEDIC SURGERY | Admitting: ORTHOPAEDIC SURGERY
Payer: COMMERCIAL

## 2018-04-26 ENCOUNTER — ANESTHESIA (OUTPATIENT)
Dept: PERIOP | Facility: HOSPITAL | Age: 53
End: 2018-04-26
Payer: COMMERCIAL

## 2018-04-26 VITALS
WEIGHT: 190.04 LBS | DIASTOLIC BLOOD PRESSURE: 83 MMHG | HEIGHT: 66 IN | HEART RATE: 59 BPM | BODY MASS INDEX: 30.54 KG/M2 | SYSTOLIC BLOOD PRESSURE: 118 MMHG | TEMPERATURE: 97.9 F | OXYGEN SATURATION: 92 % | RESPIRATION RATE: 16 BRPM

## 2018-04-26 DIAGNOSIS — M75.41 IMPINGEMENT SYNDROME OF RIGHT SHOULDER: Primary | ICD-10-CM

## 2018-04-26 PROCEDURE — 29822 SHO ARTHRS SRG LMTD DBRDMT: CPT | Performed by: PHYSICIAN ASSISTANT

## 2018-04-26 PROCEDURE — 29822 SHO ARTHRS SRG LMTD DBRDMT: CPT | Performed by: ORTHOPAEDIC SURGERY

## 2018-04-26 RX ORDER — PROPOFOL 10 MG/ML
INJECTION, EMULSION INTRAVENOUS AS NEEDED
Status: DISCONTINUED | OUTPATIENT
Start: 2018-04-26 | End: 2018-04-26 | Stop reason: SURG

## 2018-04-26 RX ORDER — SODIUM CHLORIDE, SODIUM LACTATE, POTASSIUM CHLORIDE, CALCIUM CHLORIDE 600; 310; 30; 20 MG/100ML; MG/100ML; MG/100ML; MG/100ML
75 INJECTION, SOLUTION INTRAVENOUS CONTINUOUS
Status: DISCONTINUED | OUTPATIENT
Start: 2018-04-26 | End: 2018-04-26 | Stop reason: HOSPADM

## 2018-04-26 RX ORDER — MIDAZOLAM HYDROCHLORIDE 1 MG/ML
INJECTION INTRAMUSCULAR; INTRAVENOUS AS NEEDED
Status: DISCONTINUED | OUTPATIENT
Start: 2018-04-26 | End: 2018-04-26 | Stop reason: SURG

## 2018-04-26 RX ORDER — CEPHALEXIN 500 MG/1
500 CAPSULE ORAL 4 TIMES DAILY
Qty: 4 CAPSULE | Refills: 0 | Status: SHIPPED | OUTPATIENT
Start: 2018-04-26 | End: 2018-04-27

## 2018-04-26 RX ORDER — BUPIVACAINE HYDROCHLORIDE 2.5 MG/ML
INJECTION, SOLUTION INFILTRATION; PERINEURAL AS NEEDED
Status: DISCONTINUED | OUTPATIENT
Start: 2018-04-26 | End: 2018-04-26 | Stop reason: HOSPADM

## 2018-04-26 RX ORDER — ALBUTEROL SULFATE 90 UG/1
AEROSOL, METERED RESPIRATORY (INHALATION) AS NEEDED
Status: DISCONTINUED | OUTPATIENT
Start: 2018-04-26 | End: 2018-04-26 | Stop reason: SURG

## 2018-04-26 RX ORDER — OXYCODONE HYDROCHLORIDE AND ACETAMINOPHEN 5; 325 MG/1; MG/1
1 TABLET ORAL EVERY 4 HOURS PRN
Qty: 30 TABLET | Refills: 0 | Status: SHIPPED | OUTPATIENT
Start: 2018-04-26 | End: 2018-05-06

## 2018-04-26 RX ORDER — METOCLOPRAMIDE HYDROCHLORIDE 5 MG/ML
10 INJECTION INTRAMUSCULAR; INTRAVENOUS ONCE AS NEEDED
Status: DISCONTINUED | OUTPATIENT
Start: 2018-04-26 | End: 2018-04-26 | Stop reason: HOSPADM

## 2018-04-26 RX ORDER — EPHEDRINE SULFATE 50 MG/ML
INJECTION, SOLUTION INTRAVENOUS AS NEEDED
Status: DISCONTINUED | OUTPATIENT
Start: 2018-04-26 | End: 2018-04-26 | Stop reason: SURG

## 2018-04-26 RX ORDER — SUCCINYLCHOLINE CHLORIDE 20 MG/ML
INJECTION INTRAMUSCULAR; INTRAVENOUS AS NEEDED
Status: DISCONTINUED | OUTPATIENT
Start: 2018-04-26 | End: 2018-04-26 | Stop reason: SURG

## 2018-04-26 RX ORDER — ONDANSETRON 2 MG/ML
INJECTION INTRAMUSCULAR; INTRAVENOUS AS NEEDED
Status: DISCONTINUED | OUTPATIENT
Start: 2018-04-26 | End: 2018-04-26 | Stop reason: SURG

## 2018-04-26 RX ORDER — ONDANSETRON 2 MG/ML
4 INJECTION INTRAMUSCULAR; INTRAVENOUS ONCE AS NEEDED
Status: COMPLETED | OUTPATIENT
Start: 2018-04-26 | End: 2018-04-26

## 2018-04-26 RX ORDER — LIDOCAINE HYDROCHLORIDE 10 MG/ML
INJECTION, SOLUTION INFILTRATION; PERINEURAL AS NEEDED
Status: DISCONTINUED | OUTPATIENT
Start: 2018-04-26 | End: 2018-04-26 | Stop reason: SURG

## 2018-04-26 RX ORDER — FENTANYL CITRATE/PF 50 MCG/ML
25 SYRINGE (ML) INJECTION
Status: DISCONTINUED | OUTPATIENT
Start: 2018-04-26 | End: 2018-04-26 | Stop reason: HOSPADM

## 2018-04-26 RX ORDER — SODIUM CHLORIDE, SODIUM LACTATE, POTASSIUM CHLORIDE, CALCIUM CHLORIDE 600; 310; 30; 20 MG/100ML; MG/100ML; MG/100ML; MG/100ML
INJECTION, SOLUTION INTRAVENOUS CONTINUOUS PRN
Status: DISCONTINUED | OUTPATIENT
Start: 2018-04-26 | End: 2018-04-26 | Stop reason: SURG

## 2018-04-26 RX ORDER — PROMETHAZINE HYDROCHLORIDE 25 MG/ML
12.5 INJECTION, SOLUTION INTRAMUSCULAR; INTRAVENOUS ONCE AS NEEDED
Status: DISCONTINUED | OUTPATIENT
Start: 2018-04-26 | End: 2018-04-26 | Stop reason: HOSPADM

## 2018-04-26 RX ORDER — METHYLPREDNISOLONE ACETATE 80 MG/ML
INJECTION, SUSPENSION INTRA-ARTICULAR; INTRALESIONAL; INTRAMUSCULAR; SOFT TISSUE AS NEEDED
Status: DISCONTINUED | OUTPATIENT
Start: 2018-04-26 | End: 2018-04-26 | Stop reason: HOSPADM

## 2018-04-26 RX ORDER — FENTANYL CITRATE 50 UG/ML
INJECTION, SOLUTION INTRAMUSCULAR; INTRAVENOUS AS NEEDED
Status: DISCONTINUED | OUTPATIENT
Start: 2018-04-26 | End: 2018-04-26 | Stop reason: SURG

## 2018-04-26 RX ADMIN — FENTANYL CITRATE 100 MCG: 50 INJECTION, SOLUTION INTRAMUSCULAR; INTRAVENOUS at 10:35

## 2018-04-26 RX ADMIN — CEFAZOLIN SODIUM 2000 MG: 1 SOLUTION INTRAVENOUS at 13:17

## 2018-04-26 RX ADMIN — MIDAZOLAM 2 MG: 1 INJECTION INTRAMUSCULAR; INTRAVENOUS at 10:35

## 2018-04-26 RX ADMIN — ONDANSETRON 4 MG: 2 INJECTION INTRAMUSCULAR; INTRAVENOUS at 14:20

## 2018-04-26 RX ADMIN — PROPOFOL 200 MG: 10 INJECTION, EMULSION INTRAVENOUS at 13:14

## 2018-04-26 RX ADMIN — PHENYLEPHRINE HYDROCHLORIDE 40 MCG/MIN: 10 INJECTION, SOLUTION INTRAMUSCULAR; INTRAVENOUS; SUBCUTANEOUS at 13:20

## 2018-04-26 RX ADMIN — SODIUM CHLORIDE, SODIUM LACTATE, POTASSIUM CHLORIDE, AND CALCIUM CHLORIDE: .6; .31; .03; .02 INJECTION, SOLUTION INTRAVENOUS at 09:20

## 2018-04-26 RX ADMIN — ALBUTEROL SULFATE 8 PUFF: 90 AEROSOL, METERED RESPIRATORY (INHALATION) at 14:42

## 2018-04-26 RX ADMIN — SUCCINYLCHOLINE CHLORIDE 100 MG: 20 INJECTION, SOLUTION INTRAMUSCULAR; INTRAVENOUS at 13:14

## 2018-04-26 RX ADMIN — ONDANSETRON 4 MG: 2 INJECTION INTRAMUSCULAR; INTRAVENOUS at 15:29

## 2018-04-26 RX ADMIN — EPHEDRINE SULFATE 10 MG: 50 INJECTION, SOLUTION INTRAMUSCULAR; INTRAVENOUS; SUBCUTANEOUS at 13:25

## 2018-04-26 RX ADMIN — LIDOCAINE HYDROCHLORIDE 50 MG: 10 INJECTION, SOLUTION INFILTRATION; PERINEURAL at 13:14

## 2018-04-26 RX ADMIN — DEXAMETHASONE SODIUM PHOSPHATE 4 MG: 10 INJECTION INTRAMUSCULAR; INTRAVENOUS at 13:17

## 2018-04-26 RX ADMIN — IPRATROPIUM BROMIDE 0.5 MG: 0.5 SOLUTION RESPIRATORY (INHALATION) at 15:18

## 2018-04-26 NOTE — ANESTHESIA PREPROCEDURE EVALUATION
Review of Systems/Medical History  Patient summary reviewed        Cardiovascular  EKG reviewed, Hyperlipidemia, Hypertension ,    Pulmonary  Negative pulmonary ROS        GI/Hepatic  Negative GI/hepatic ROS          Negative  ROS        Endo/Other  Negative endo/other ROS      GYN  Negative gynecology ROS          Hematology  Negative hematology ROS      Musculoskeletal  Negative musculoskeletal ROS        Neurology    Headaches,    Psychology   Negative psychology ROS              Physical Exam    Airway    Mallampati score: II  TM Distance: >3 FB  Neck ROM: full     Dental       Cardiovascular  Cardiovascular exam normal    Pulmonary  Pulmonary exam normal     Other Findings        Anesthesia Plan  ASA Score- 2     Anesthesia Type- general and regional with ASA Monitors  Additional Monitors:   Airway Plan: ETT  Plan Factors-    Induction- intravenous  Postoperative Plan-     Informed Consent- Anesthetic plan and risks discussed with patient  I personally reviewed this patient with the CRNA  Discussed and agreed on the Anesthesia Plan with the CRNA  Sherre Soulier

## 2018-04-26 NOTE — DISCHARGE INSTRUCTIONS
Ice to right shoulder 20 minutes 3 times a day  Sling is for comfort only  May remove  Keep dressing on for three days  May shower in three days

## 2018-04-26 NOTE — H&P (VIEW-ONLY)
Chief Complaint   Patient presents with    Right Shoulder - Follow-up         Subjective     Patient's symptoms are unchanged  He continues to have pain with overhead activity and with reaching forward  He had the MRI scan of his right shoulder performed on  March 22, 2018  This was reviewed in office today  There is no obvious rotator cuff tear  There is a possible slap tear  ROS:   General: no fever, no chills  HEENT:  No loss of hearing or eyesight problems  Eyes:  No red eyes  Respiratory:  No coughing, shortness of breath or wheezing  Cardiovascular:  No chest pain, no palpitations  GI:  Abdomen soft nontender, denies nausea  Endocrine:  No muscle weakness, no frequent urination, no excessive thirst  Urinary:  No dysuria, no incontinence  Musculoskeletal: see HPI and PE  SKIN:  No skin rash, no dry skin  Neurological:  No headaches, no confusion  Psychiatric:  No suicide thoughts, no anxiety, no depression  Review of all other systems is negative    Past Medical History:   Diagnosis Date    Hyperlipidemia     Hypertension     Migraines     Varicella        Current Outpatient Prescriptions on File Prior to Visit   Medication Sig Dispense Refill    amLODIPine (NORVASC) 10 mg tablet TAKE 1 TABLET DAILY AS DIRECTED  90 tablet 1    Ascorbic Acid (VITAMIN C) 500 MG CAPS Take by mouth      aspirin 325 mg tablet Take by mouth      lisinopril (ZESTRIL) 20 mg tablet TAKE 1 TABLET DAILY 90 tablet 1    meloxicam (MOBIC) 15 mg tablet Take 1 tablet by mouth daily 30 tablet 1    Pediatric Multivitamins-Fl (MULTIVITAMINS/FL PO) Take by mouth       No current facility-administered medications on file prior to visit          No Known Allergies    Social History       General Appearance:  Alert, cooperative, no distress, appears stated age   Lungs:   respirations unlabored   Heart:  Regular rate and rhythm   Abdomen:   Soft, non-tender,  no masses   Extremities: Extremities normal, atraumatic, no cyanosis or edema   Pulses: 2+ and symmetric   Skin: Skin color, texture, turgor normal, no rashes or lesions   Neurologic: Normal         Right Shoulder Exam     Tenderness   The patient is experiencing tenderness in the   Posterior and lateral shoulder  Range of Motion   Active Abduction:                       Normal  Passive Abduction:                    Normal  Extension:                                  Normal  Forward Flexion:                        180  External Rotation:                      90  Internal Rotation 0 degrees:      Lumbar    Muscle Strength   Abduction:            4/5  Internal Rotation:  4/5  External Rotation: 4/5  Supraspinatus:     4/5  Subscapularis:     4/5  Biceps:                 4/5    Tests   Impingement:   Positive  Cross Arm:      Negative  Drop Arm:        Negative  Apprehension: Negative  Sulcus:            Positive      Positive Speeds test  Positive Yergson's test       ASSESSMENT:    Eric Miranda was seen today for follow-up  Diagnoses and all orders for this visit:    Right shoulder pain, unspecified chronicity  -     Case request operating room: ARTHROSCOPY SHOULDER, TENODESIS BICEPS OPEN PROXIMAL, ACROMIOPLASTY, REPAIR LABRUM; Standing  -     Case request operating room: ARTHROSCOPY SHOULDER, TENODESIS BICEPS OPEN PROXIMAL, ACROMIOPLASTY, REPAIR LABRUM    Superior glenoid labrum lesion of right shoulder, initial encounter  -     Case request operating room: ARTHROSCOPY SHOULDER, TENODESIS BICEPS OPEN PROXIMAL, ACROMIOPLASTY, REPAIR LABRUM; Standing  -     Case request operating room: ARTHROSCOPY SHOULDER, TENODESIS BICEPS OPEN PROXIMAL, ACROMIOPLASTY, REPAIR LABRUM    Other orders  -     Height and weight upon arrival; Standing  -     Void on call to OR; Standing  -     Insert peripheral IV; Standing  -     ceFAZolin (ANCEF) 1,000 mg in dextrose 5 % 100 mL IVPB;  Infuse 1,000 mg into a venous catheter once           PLAN:      Diagnostic arthroscopy and possible open subpectoral biceps tenodesis if significant slap tear is noted  We will also consider labral repair and acromioplasty  I doubt any significant rotator cuff tear will be found, but if one is we will consider repair  Risks and benefits of these procedures were discussed in detail with the patient and he wished to proceed

## 2018-04-26 NOTE — OP NOTE
OPERATIVE REPORT    Patient Name: Alicia Rojas    :  1965  MRN: [id number]  Pt Location: [facility] OR ROOM 03    Surgery Date:   2018    Surgeon(s) and Role:     * Prabhakar Santos MD - Primary     * Kal Moctezuma PA-C - Assisting    Preop Diagnosis:  Superior glenoid labrum lesion of right shoulder, initial encounter [S43 431A]  Right shoulder pain, unspecified chronicity [M25 511]    Post-Op Diagnosis Codes:    No Significant Superior glenoid labrum lesion of right shoulde     * Right shoulder Subacromial Bursitis    Procedure(s) (LRB):  ARTHROSCOPY SHOULDER; SUBACROMIAL BURSECTOMY (Right)    Specimen(s):  * No specimens in log *    Estimated Blood Loss:   Minimal    Drains:   None    Implants:   * No implants in log *    Anesthesia Type:   General and regional    Operative Indications:  20-year-old male with anterior and lateral or right shoulder pain not responsive to extensive conservative care including physical therapy and anti-inflammatory medication  MRI of the right shoulder was consistent with a SLAP tear  No rotator cuff tear noted  No significant arthritis noted  We discussed the risks and benefits of possible biceps tenodesis or SLAP repair to improve symptoms based on patient's history and exam and MRI  Patient understood risks and wished to proceed  Operative Findings:  No significant SLAP tear  No biceps tendinosis  Very significant subacromial bursitis    Complications:   None    Procedure and Technique:  Patient had the right shoulder marked in the preoperative area  He was administered a regional block by anesthesia for the right upper extremity  Risks and benefits of surgery were again reviewed and patient's questions answered  Patient agreed to proceed  Patient was taken to the operating room  He was administered 2 g IV Ancef  He was placed on the OR table in the supine position  General anesthesia administered  He was then placed into the beach chair position   All pressure points were checked and well padded  Head held with a well-padded head fajardo  The right upper extremity was examined  There was full passive range of motion  He sublux about 3/4 humeral head diameter anteriorly and 1/2 posteriorly  Again forward flexion was 170° abduction was 107°  External rotation at 90° was abduction was 90°  Internal rotation at 90° abduction was 80°  Load shift testing was negative  The right shoulder was prepped and draped in the usual sterile fashion  Time-out was performed  A posterior portal was established  Scope introduced  Glenoid surface articular surface and humeral head articular surfaces were in good condition  Anterior labrum intact  Biceps tendon intact  No noted lesion in the superior labrum  There was a very large superior labrum  No noted lesion in the posterior labrum  An anterior portal was established  The probe was introduced  The superior labrum was probed and no significant tear was noted  Rotator cuff was then evaluated  Subscapularis intact  Supraspinatus intact  Infraspinatus intact  Inferior recess normal  Scope was removed from the joint  It was introduced into the subacromial bursa  A subacromial bursectomy was performed using full-radius resector and arthro Wand through a lateral portal  No tear noted in the exterior bursal sided rotator cuff  Hemostasis was obtained with arthro Wand  Bursa irrigated and drained  Portals closed with nylon stitches  Shoulder injected with 40 mg Depo-Medrol  Dry sterile dressing applied  No complications  Patient tolerated the procedure well  We placed him in a sling for comfort  And because he had the block  He went to recovery room in stable condition  Hugh Albright assisted throughout the case with shoulder positioning       I was present for the entire procedure    Patient Disposition:  PACU     SIGNATURE: Justin Cole MD  DATE: April 26, 2018  TIME: 2:48 PM      Portions of the record may have been created with voice recognition software   Occasional wrong word or "sound a like" substitutions may have occurred due to the inherent limitations of voice recognition software   Read the chart carefully and recognize, using context, where substitutions have occurred

## 2018-04-26 NOTE — ANESTHESIA PREPROCEDURE EVALUATION
Review of Systems/Medical History  Patient summary reviewed        Cardiovascular  EKG reviewed, Hyperlipidemia, Hypertension ,    Pulmonary  Negative pulmonary ROS        GI/Hepatic  Negative GI/hepatic ROS          Negative  ROS        Endo/Other  Negative endo/other ROS      GYN  Negative gynecology ROS          Hematology  Negative hematology ROS      Musculoskeletal  Negative musculoskeletal ROS        Neurology    Headaches,    Psychology   Negative psychology ROS              Physical Exam    Airway    Mallampati score: II  TM Distance: >3 FB  Neck ROM: full     Dental       Cardiovascular  Cardiovascular exam normal    Pulmonary  Pulmonary exam normal     Other Findings        Anesthesia Plan  ASA Score- 2     Anesthesia Type- general and regional with ASA Monitors  Additional Monitors:   Airway Plan: ETT  Plan Factors-    Induction- intravenous  Postoperative Plan-     Informed Consent- Anesthetic plan and risks discussed with patient  I personally reviewed this patient with the CRNA  Discussed and agreed on the Anesthesia Plan with the CRNA  Ish Pepper

## 2018-04-26 NOTE — ANESTHESIA POSTPROCEDURE EVALUATION
Post-Op Assessment Note      CV Status:  Stable    Mental Status:  Alert and awake    Hydration Status:  Stable    PONV Controlled:  None    Airway Patency:  Patent and adequate    Post Op Vitals Reviewed: Yes          Staff: Anesthesiologist, CRNA           BP   161/95   Temp   97 3   Pulse  78   Resp   18   SpO2   96% 2l NC

## 2018-04-26 NOTE — ANESTHESIA PROCEDURE NOTES
Peripheral Block    Patient location during procedure: holding area  Start time: 4/26/2018 10:30 AM  Removal time: 4/26/2018 10:35 AM  Reason for block: at surgeon's request and post-op pain management  Staffing  Anesthesiologist: Nadya Dunham  Performed: anesthesiologist   Preanesthetic Checklist  Completed: patient identified, site marked, surgical consent, pre-op evaluation, timeout performed, IV checked, risks and benefits discussed and monitors and equipment checked  Peripheral Block  Patient position: supine  Prep: ChloraPrep  Patient monitoring: continuous pulse ox and frequent blood pressure checks  Block type: interscalene  Laterality: right  Injection technique: single-shot  Procedures: ultrasound guided and nerve stimulator  Ultrasound permanent image saved  Local infiltration: ropivacaine  Infiltration strength: 0 5 %  Dose: 30 mL  Needle  Needle localization: ultrasound guidance and nerve stimulator  Test dose: negative  Assessment  Injection assessment: incremental injection and local visualized surrounding nerve on ultrasound  Paresthesia pain: none  Heart rate change: no  Slow fractionated injection: yes  patient tolerated the procedure well with no immediate complications

## 2018-05-01 ENCOUNTER — TELEPHONE (OUTPATIENT)
Dept: OBGYN CLINIC | Facility: HOSPITAL | Age: 53
End: 2018-05-01

## 2018-05-01 NOTE — TELEPHONE ENCOUNTER
Patient left a voice mail asking that Dr Herrera Hernandez call him back because he has a few post op questions  Patient has a post op appt on Friday      cb [phone]

## 2018-05-01 NOTE — TELEPHONE ENCOUNTER
Called,  Left message for patient to call me back at the office to answer any postop questions he may have  I will also try to call him tomorrow, Wednesday, as well

## 2018-05-04 NOTE — TELEPHONE ENCOUNTER
I called patient but he never returned call  Was here for post-op 5/4/2018 but left before being seen, we were running behind because Dr Geovany Prado had to go to OR early and we had patients come in early  I think he still has sutures in, please call to see if we can get him in early next week  Thanks      [facility]

## 2018-05-08 ENCOUNTER — OFFICE VISIT (OUTPATIENT)
Dept: OBGYN CLINIC | Facility: CLINIC | Age: 53
End: 2018-05-08

## 2018-05-08 ENCOUNTER — EVALUATION (OUTPATIENT)
Dept: PHYSICAL THERAPY | Facility: CLINIC | Age: 53
End: 2018-05-08
Payer: COMMERCIAL

## 2018-05-08 VITALS
DIASTOLIC BLOOD PRESSURE: 111 MMHG | WEIGHT: 193.6 LBS | BODY MASS INDEX: 30.39 KG/M2 | SYSTOLIC BLOOD PRESSURE: 152 MMHG | HEART RATE: 57 BPM | HEIGHT: 67 IN

## 2018-05-08 DIAGNOSIS — M25.511 CHRONIC RIGHT SHOULDER PAIN: ICD-10-CM

## 2018-05-08 DIAGNOSIS — Z48.89 AFTERCARE FOLLOWING SURGERY: Primary | ICD-10-CM

## 2018-05-08 DIAGNOSIS — M25.511 RIGHT SHOULDER PAIN, UNSPECIFIED CHRONICITY: ICD-10-CM

## 2018-05-08 DIAGNOSIS — G89.29 CHRONIC RIGHT SHOULDER PAIN: ICD-10-CM

## 2018-05-08 PROCEDURE — 97012 MECHANICAL TRACTION THERAPY: CPT | Performed by: PHYSICAL THERAPIST

## 2018-05-08 PROCEDURE — 97162 PT EVAL MOD COMPLEX 30 MIN: CPT | Performed by: PHYSICAL THERAPIST

## 2018-05-08 PROCEDURE — 99024 POSTOP FOLLOW-UP VISIT: CPT | Performed by: ORTHOPAEDIC SURGERY

## 2018-05-08 PROCEDURE — G8991 OTHER PT/OT GOAL STATUS: HCPCS | Performed by: PHYSICAL THERAPIST

## 2018-05-08 PROCEDURE — G8990 OTHER PT/OT CURRENT STATUS: HCPCS | Performed by: PHYSICAL THERAPIST

## 2018-05-08 RX ORDER — LISINOPRIL 10 MG/1
TABLET ORAL
COMMUNITY
End: 2018-08-10

## 2018-05-08 RX ORDER — MELOXICAM 15 MG/1
15 TABLET ORAL DAILY
Qty: 30 TABLET | Refills: 1 | Status: SHIPPED | OUTPATIENT
Start: 2018-05-08 | End: 2018-08-22 | Stop reason: HOSPADM

## 2018-05-08 NOTE — PROGRESS NOTES
CHIEF COMPLAINT:   Chief Complaint   Patient presents with    Right Shoulder - Post-op         PROCEDURE: Status post right shoulder diagnostic arthroscopy and subacromial bursectomy 04/26/2018      SUBJECTIVE: Kristopher Huff is a 46 y o  male is here for follow up  Patient continues to have right shoulder pain  Worse with raising his arm  ROS:   General: no fever, no chills  Respiratory:  No coughing, shortness of breath or wheezing  Cardiovascular:  No chest pain, no palpitations  Neurological:  No headaches, no confusion  Review of all other systems is negative    MEDS:   Current Outpatient Prescriptions   Medication Sig Dispense Refill    amLODIPine (NORVASC) 10 mg tablet TAKE 1 TABLET DAILY AS DIRECTED  90 tablet 1    Ascorbic Acid (VITAMIN C) 500 MG CAPS Take 500 mg by mouth        aspirin 325 mg tablet Take by mouth      lisinopril (ZESTRIL) 10 mg tablet lisinopril 10 mg tablet      meloxicam (MOBIC) 15 mg tablet TAKE 1 TABLET BY MOUTH DAILY 30 tablet 1    metoprolol succinate (TOPROL-XL) 50 mg 24 hr tablet Take 1 tablet (50 mg total) by mouth daily 30 tablet 1    Multiple Vitamins-Minerals (MULTIVITAMIN ADULT PO) QD       No current facility-administered medications for this visit  ALLERGIES: Patient has no known allergies  Vitals:    05/08/18 0806   BP: (!) 152/111   Pulse: 57   Weight: 87 8 kg (193 lb 9 6 oz)   Height: 5' 7 01" (1 702 m)       PHYSICAL EXAM:    General Appearance:  Alert, cooperative, no distress, appears stated age   Lungs:   respirations unlabored   Chest Wall:  No tenderness or deformity   Heart:  Normal Heart rate noted   Extremities: Extremities normal, atraumatic, no cyanosis or edema   Pulses: 2+ and symmetric   Neurologic: Normal     ORTHO EXAM:    Right shoulder arthroscopic portals clean dry and intact  Patient still has positive impingement test     He has some tenderness over the bicipital groove      There is some discomfort with internal rotation against resistance  There is discomfort with abduction greater than 80 and forward flexion greater than 90  Strength is 5/5  Load shift testing negative  ASSESSMENT/PLAN:   S/p  Right shoulder diagnostic arthroscopy and subacromial bursectomy  The etiology of symptoms was not clear on arthroscopy  We did not note any possible synovitis or tear or any labral tear  We did not note any bri rotator cuff tear although there was tendinosis in the articular side of the supraspinatus  No significant arthritis  He did have subacromial bursitis nose hoping that subacromial bursectomy would help him some  We will start him on a course of physical therapy  I explained it is conceivable that he could have a hidden biceps lesion more distal in the groove for perhaps a subscapularis tear that we did not see  Patient will physical id he will follow follow-up in 6 weeks  Sutures were removed today

## 2018-05-08 NOTE — PROGRESS NOTES
PT Evaluation     Today's date: 2018  Patient name: Dino Elizondo  : 1965  MRN: [id number]  Referring provider: Scottie Mosqueda MD  Dx:   Encounter Diagnosis     ICD-10-CM    1  Right shoulder pain, unspecified chronicity M25 511 Ambulatory referral to Physical Therapy                  Assessment  Impairments: abnormal or restricted ROM, activity intolerance, impaired balance, impaired physical strength, lacks appropriate home exercise program and pain with function    Assessment details: Patient is a 46year old female presenting to physical therapy with right shoulder pain  He is > 1 week s/p right exploratory arthroscopy of right shoulder  He has contributing factors of decreased right shoulder AROM, PROM, and strength  He demonstrated improved AROM with decreased pain following manual and mechanical cervical traction  These deficits are limiting his ability to complete ADL's, yard work, household maintenance, and reach over head without pain  Patient will benefit from physical therapy in order to address the deficits contributing to functional limitations and facilitate return to prior level of functioning  Patient was provided a home exercise program and demonstrated an understanding of exercises  Patient was advised to stop performing home exercise program if symptoms increase or new complaints developed  Verbal understanding demonstrated regarding home exercise program instructions  Patient was educated on and agreeable to plan of care  Patient denied all contraindications to mechanical cervical traction      Understanding of Dx/Px/POC: good   Prognosis: fair    Goals  Short term goals:  1) Patient will demonstrate increase right shoulder AROM by 10-15 degrees 4 weeks  2) Patient will demonstrate increased right shoulder strength by 1/2 grade 4 weeks  3) Patient will demonstrate decreased pain by 20-50% in 4 weeks  Long term goals:  1) Patient will demonstrate ability to reach right arm overhead with pain <3/10 in 6 weeks  2) Patient will demonstrate ability to reach right hand behind his back with pain <3/10 in 6 weeks  3) Patient will demonstrate ability to vacuum with right shoulder with pain <3/10 in 6 weeks  4) Patient will demonstrate independence with HEP in 6 weeks          Plan  Patient would benefit from: skilled PT  Referral necessary: No  Planned modality interventions: H-Wave, thermotherapy: hydrocollator packs, traction and cryotherapy  Planned therapy interventions: home exercise program, graded exercise, graded activity, functional ROM exercises, flexibility, ADL retraining, ADL training, therapeutic training, therapeutic exercise, therapeutic activities, strengthening, stretching, neuromuscular re-education, manual therapy, joint mobilization and patient education  Frequency: 2x week  Duration in weeks: 6  Treatment plan discussed with: patient        Subjective Evaluation    History of Present Illness  Mechanism of injury: Patient reports that his right shoulder has been hurting between 3-5 years  Patient reports that the pain is of insidious onset  Patient reports that his pain has been staying the same over time  Patient reports that he has been prescribed Meloxicam but does not notice any change  Patient reports that his xrays and MRI's without significant  On 2018 an exploratory arthroscopic surgery was performed on right shoulder  There was no significant findings with this  Patient reports that he had physical therapy prior which helped his shoulder  Patient states that he has still been keeping up with his exercises  Patient reports that his goals for physical therapy are to decrease pain and increase motion     Pain  No pain reported  Current pain ratin  At best pain rating: 3  At worst pain rating: 10  Location: Rigth shoulder- constantly, varying, "achy wtith periods of stabbing"    Social Support  Lives in: multiple-level home  Lives with: significant other    Employment status: working (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 and is not avoiding anything at work)  Hand dominance: right  Exercise history: Patient is been avoiding the yardwork and chores around the house(vacuuming)  Paitient reports that he likes to cycle (3-4 times per week 30-90 miles a day)          Objective     Active Range of Motion   Left Shoulder   Flexion: 158 degrees   Extension: 28 degrees   Abduction: 154 degrees   External rotation BTH: T4   Internal rotation BTB: T9     Right Shoulder   Flexion: 100 degrees with pain  Extension: 25 degrees   Abduction: 78 degrees   Internal rotation BTB: sacrum     Passive Range of Motion     Right Shoulder   Flexion: 90 degrees with pain  Abduction: 105 degrees with pain  External rotation 45°: 45 degrees   Internal rotation 45°: 50 degrees     Additional Passive Range of Motion Details  Improved to 120 degrees of flexion and 140 degrees of abduction when performed concurrently with manual cervical traction       Strength/Myotome Testing     Right Shoulder     Planes of Motion   Flexion: 3-   Extension: 3-   Abduction: 3-           Precautions: HTN    Daily Treatment Diary     Manual  5/8            Shoulder PROM NV                                                                    Exercise Diary  5/8            Wall Walks 10x                                                                                                                                                                                                                                                                       Modalities  5/8            Cervical traction IM 8' 16-18# max 5# min

## 2018-05-16 ENCOUNTER — OFFICE VISIT (OUTPATIENT)
Dept: PHYSICAL THERAPY | Facility: CLINIC | Age: 53
End: 2018-05-16
Payer: COMMERCIAL

## 2018-05-16 DIAGNOSIS — G89.29 CHRONIC RIGHT SHOULDER PAIN: Primary | ICD-10-CM

## 2018-05-16 DIAGNOSIS — M25.511 CHRONIC RIGHT SHOULDER PAIN: Primary | ICD-10-CM

## 2018-05-16 PROCEDURE — 97012 MECHANICAL TRACTION THERAPY: CPT | Performed by: PHYSICAL THERAPIST

## 2018-05-16 PROCEDURE — 97110 THERAPEUTIC EXERCISES: CPT | Performed by: PHYSICAL THERAPIST

## 2018-05-16 PROCEDURE — 97140 MANUAL THERAPY 1/> REGIONS: CPT | Performed by: PHYSICAL THERAPIST

## 2018-05-16 NOTE — PROGRESS NOTES
Daily Note     Today's date: 2018  Patient name: Rocio Black  : 1965  MRN: [id number]  Referring provider: Jim Love MD  Dx:   Encounter Diagnosis     ICD-10-CM    1  Chronic right shoulder pain M25 511     G89 29                   Subjective: Patient reports that he has noticed more motion in his right shoulder following his last visit with traction  He states that he still has difficulty reaching his arm up and out to the side (demonstrates abduction)  Objective: See treatment diary below  Manual  5/8  5/15                   Shoulder PROM NV  TK                    modified median nerve glides    TK                                                                                                 Exercise Diary  5/8  5/15                   Wall Walks 10x  NP                    Pulley's    5 min                    side lying ER to neutral (no weight)   2 x 10                    FRO   2 x 10                    Supine cane AAROM flexion   2  X 10                    Side lying abd to 90 degrees   2 x 10                    Cervical Retractoin   2 x 10                    Cervical Retraction with OP   2 x 10                                                                                                                                                                                                                                                                                                                         Modalities  5/8  5/15                   Cervical traction IM 8' 16-18# max 5# min  IM 10' 20# max- 5# min                                                                       Assessment:  Patient continues to reports relief of right shoulder pain and improved right shoulder flexion ROM following mechanical traction  He demonstrated pain at 90 degrees of active abduction   Following completion of cervical retraction exercises, he was able to demonstrate near normal right shoulder abduction with pain not coming until end range of motion  He was educated to complete cervical retractions as part of HEP as long as it continues to improve right shoulder ROM and decrease pain  He will continue to benefit from PT in order to increase right shoulder AROM and decrease right shoulder pain  Plan: Continue per plan of care

## 2018-05-23 ENCOUNTER — APPOINTMENT (OUTPATIENT)
Dept: PHYSICAL THERAPY | Facility: CLINIC | Age: 53
End: 2018-05-23
Payer: COMMERCIAL

## 2018-05-25 ENCOUNTER — OFFICE VISIT (OUTPATIENT)
Dept: PHYSICAL THERAPY | Facility: CLINIC | Age: 53
End: 2018-05-25
Payer: COMMERCIAL

## 2018-05-25 DIAGNOSIS — M25.511 CHRONIC RIGHT SHOULDER PAIN: Primary | ICD-10-CM

## 2018-05-25 DIAGNOSIS — G89.29 CHRONIC RIGHT SHOULDER PAIN: Primary | ICD-10-CM

## 2018-05-25 DIAGNOSIS — M25.511 RIGHT SHOULDER PAIN, UNSPECIFIED CHRONICITY: ICD-10-CM

## 2018-05-25 PROCEDURE — 97140 MANUAL THERAPY 1/> REGIONS: CPT | Performed by: PHYSICAL THERAPIST

## 2018-05-25 PROCEDURE — 97110 THERAPEUTIC EXERCISES: CPT | Performed by: PHYSICAL THERAPIST

## 2018-05-25 NOTE — PROGRESS NOTES
Daily Note     Today's date: 2018  Patient name: Jorge Collins  : 1965  MRN: [id number]  Referring provider: Rogers Soriano MD  Dx:   Encounter Diagnosis     ICD-10-CM    1  Chronic right shoulder pain M25 511     G89 29    2  Right shoulder pain, unspecified chronicity M25 511                   Subjective: Patient reports that he fell down onto right shoulder - notes that the pain from falling only lasted for several minutes - and then he felt that he was back to his baseline level of pain  Notes that the mechanical cervical traction does appear to help with improved mobility and slight reduction in symptoms reported  Objective: See treatment diary below  Manual  5/8  5/15  5/25                 Shoulder PROM NV  TK  db                  modified median nerve glides    TK db                                                                                               Exercise Diary  5/8  5/15  5/25                 Wall Walks 10x  NP                    Pulley's    5 min  5 min                  side lying ER to neutral (no weight)   2 x 10  10x2                  FRO   2 x 10 10x 2                  Supine cane AAROM flexion   2  X 10 20x                  Side lying abd to 90 degrees   2 x 10 10x2                  Cervical Retractoin   2 x 10  10x 2                  Cervical Retraction with OP   2 x 10  10x 2                                                                                                                                                                                                                                                                                                                       Modalities  5/8  5/15  5/25                 Cervical traction IM 8' 16-18# max 5# min  IM 10' 20# max- 5# min  IM 10' 20# max  5#                                                                     Assessment:  Patient painful with PROM, n glides today    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

## 2018-05-30 ENCOUNTER — OFFICE VISIT (OUTPATIENT)
Dept: PHYSICAL THERAPY | Facility: CLINIC | Age: 53
End: 2018-05-30
Payer: COMMERCIAL

## 2018-05-30 DIAGNOSIS — M25.511 CHRONIC RIGHT SHOULDER PAIN: Primary | ICD-10-CM

## 2018-05-30 DIAGNOSIS — M25.511 RIGHT SHOULDER PAIN, UNSPECIFIED CHRONICITY: ICD-10-CM

## 2018-05-30 DIAGNOSIS — G89.29 CHRONIC RIGHT SHOULDER PAIN: Primary | ICD-10-CM

## 2018-05-30 PROCEDURE — 97012 MECHANICAL TRACTION THERAPY: CPT

## 2018-05-30 PROCEDURE — 97110 THERAPEUTIC EXERCISES: CPT

## 2018-05-30 PROCEDURE — 97140 MANUAL THERAPY 1/> REGIONS: CPT

## 2018-05-30 PROCEDURE — 97112 NEUROMUSCULAR REEDUCATION: CPT

## 2018-05-30 NOTE — PROGRESS NOTES
Daily Note     Today's date: 2018  Patient name: Corinne Mix  : 1965  MRN: [id number]  Referring provider: Antonella Gandara MD  Dx:   Encounter Diagnosis     ICD-10-CM    1  Chronic right shoulder pain M25 511     G89 29    2  Right shoulder pain, unspecified chronicity M25 511                   Subjective: Upon presentation patient reports soreness in shoulder  Objective: See treatment diary below  Manual  5/8  5/15  5/25  5/30               Shoulder PROM NV  TK  db JK                modified median nerve glides    TK db  JK                                                                                             Exercise Diary  5/8  5/15  5/25  5/30               Wall Walks 10x  NP                    Pulley's    5 min  5 min  5 min                side lying ER to neutral (no weight)   2 x 10  10x2  10x2                FRO   2 x 10 10x 2  10x2                Supine cane AAROM flexion   2  X 10 20x  20x                Side lying abd to 90 degrees   2 x 10 10x2  2x10                Cervical Retractoin   2 x 10  10x 2  2x10                Cervical Retraction with OP   2 x 10  10x 2  2x10                                                                                                                                                                                                                                                                                                                     Modalities  5/8  5/15  5/25  5/30               Cervical traction IM 8' 16-18# max 5# min  IM 10' 20# max- 5# min  IM 10' 20# max  5#  IM 10' max- 20# max min 5#                                                                   Assessment:  Patient demonstrates painful arch with PROM, however no lasting pain present  Tightness noted in Median N root today  Patient demonstrates good tolerance to TE this session  Patient offers no complaints post session, consider additional TE NV       Plan: Continue per plan of care

## 2018-06-01 ENCOUNTER — APPOINTMENT (OUTPATIENT)
Dept: PHYSICAL THERAPY | Facility: CLINIC | Age: 53
End: 2018-06-01
Payer: COMMERCIAL

## 2018-06-03 DIAGNOSIS — I10 HYPERTENSION, UNSPECIFIED TYPE: ICD-10-CM

## 2018-06-04 RX ORDER — METOPROLOL SUCCINATE 50 MG/1
50 TABLET, EXTENDED RELEASE ORAL DAILY
Qty: 30 TABLET | Refills: 1 | Status: SHIPPED | OUTPATIENT
Start: 2018-06-04 | End: 2018-08-14 | Stop reason: SDUPTHER

## 2018-06-06 ENCOUNTER — APPOINTMENT (OUTPATIENT)
Dept: PHYSICAL THERAPY | Facility: CLINIC | Age: 53
End: 2018-06-06
Payer: COMMERCIAL

## 2018-06-08 ENCOUNTER — OFFICE VISIT (OUTPATIENT)
Dept: PHYSICAL THERAPY | Facility: CLINIC | Age: 53
End: 2018-06-08
Payer: COMMERCIAL

## 2018-06-08 DIAGNOSIS — M25.511 RIGHT SHOULDER PAIN, UNSPECIFIED CHRONICITY: ICD-10-CM

## 2018-06-08 DIAGNOSIS — G89.29 CHRONIC RIGHT SHOULDER PAIN: Primary | ICD-10-CM

## 2018-06-08 DIAGNOSIS — M25.511 CHRONIC RIGHT SHOULDER PAIN: Primary | ICD-10-CM

## 2018-06-08 PROCEDURE — 97110 THERAPEUTIC EXERCISES: CPT | Performed by: PHYSICAL THERAPIST

## 2018-06-08 PROCEDURE — 97012 MECHANICAL TRACTION THERAPY: CPT | Performed by: PHYSICAL THERAPIST

## 2018-06-08 PROCEDURE — 97140 MANUAL THERAPY 1/> REGIONS: CPT | Performed by: PHYSICAL THERAPIST

## 2018-06-08 NOTE — PROGRESS NOTES
Daily Note     Today's date: 2018  Patient name: James Garcia  : 1965  MRN: [id number]  Referring provider: Khris Moreno MD  Dx:   Encounter Diagnosis     ICD-10-CM    1  Chronic right shoulder pain M25 511     G89 29    2  Right shoulder pain, unspecified chronicity M25 511                   Subjective: Patient reports that he has pain with reaching his right shoulder overhead as well as when sleeping      Objective: See treatment diary below     Subjective: Upon presentation patient reports soreness in shoulder       Objective: See treatment diary below  Manual  5/8  5/15  5/25  5/30  6/8             Shoulder PROM NV  TK  db JK  TK              modified median nerve glides    TK db  JK                Cervical distraction retraction extension         TK                                                                   Exercise Diary  5/8  5/15  5/25  5/30  6/8             Wall Walks 10x  NP      10x              Pulley's    5 min  5 min  5 min  5 min              side lying ER to neutral (no weight)   2 x 10  10x2  10x2  10x2              FRO   2 x 10 10x 2  10x2  10x2              Supine cane AAROM flexion   2  X 10 20x  20x   20x              Side lying abd to 90 degrees   2 x 10 10x2  2x10   2x10              Cervical Retractoin   2 x 10  10x 2  2x10   2x10              Cervical Retraction with OP   2 x 10  10x 2  2x10  2x10             Cervical Retraction Extension          2x10                                                                                                                                                                                                                                                                                           Modalities  5/8  5/15  5/25  5/30  6/8             Cervical traction IM 8' 16-18# max 5# min  IM 10' 20# max- 5# min  IM 10' 20# max  5#  IM 10' max- 20# max min 5# M 10' max- 20# max min 5#                                                                    Assessment:  Patient demonstrated improved right shoulder flexion ROM and improved quality with motion as he was able to flex arm without catch following cervical retraction with extension  Patient was educated on sleeping postioning in left luis miguel lying with right arm supported with pillow  He was able to complete cervical retraction with extension 10 times every other hour in order to maintain right shoulder flexion  He will continue to benefit from PT in order to improve right shoulder flexion ROM and decrease right shoulder pain  Plan: Continue per plan of care

## 2018-06-13 ENCOUNTER — OFFICE VISIT (OUTPATIENT)
Dept: PHYSICAL THERAPY | Facility: CLINIC | Age: 53
End: 2018-06-13
Payer: COMMERCIAL

## 2018-06-13 DIAGNOSIS — G89.29 CHRONIC RIGHT SHOULDER PAIN: Primary | ICD-10-CM

## 2018-06-13 DIAGNOSIS — M25.511 CHRONIC RIGHT SHOULDER PAIN: Primary | ICD-10-CM

## 2018-06-13 DIAGNOSIS — M25.511 RIGHT SHOULDER PAIN, UNSPECIFIED CHRONICITY: ICD-10-CM

## 2018-06-13 PROCEDURE — 97140 MANUAL THERAPY 1/> REGIONS: CPT

## 2018-06-13 PROCEDURE — 97112 NEUROMUSCULAR REEDUCATION: CPT

## 2018-06-13 PROCEDURE — 97110 THERAPEUTIC EXERCISES: CPT

## 2018-06-13 NOTE — PROGRESS NOTES
Daily Note     Today's date: 2018  Patient name: Alicia Rojas  : 1965  MRN: [id number]  Referring provider: Paulette Go MD  Dx:   Encounter Diagnosis     ICD-10-CM    1  Chronic right shoulder pain M25 511     G89 29    2  Right shoulder pain, unspecified chronicity M25 511                   Subjective: Patient reprots not much change following cervical traction   Patiet     Objective: See treatment diary below     Subjective: Upon presentation patient reports soreness in shoulder       Objective: See treatment diary below  Manual  5/8  5/15  5/25  5/30  6/8  6/13           Shoulder PROM NV  TK  db JK  TK  JK            modified median nerve glides    TK db  JK                Cervical distraction retraction extension         TK                                                                   Exercise Diary  5/8  5/15  5/25  5/30  6/8  6/13           Wall Walks 10x  NP      10x  10x            Pulley's    5 min  5 min  5 min  5 min  5 min              side lying ER to neutral (no weight)   2 x 10  10x2  10x2  10x2  10x2            FRO   2 x 10 10x 2  10x2  10x2  2x10            Supine cane AAROM flexion   2  X 10 20x  20x   20x  20x            Side lying abd to 90 degrees   2 x 10 10x2  2x10   2x10  2x10            Cervical Retractoin   2 x 10  10x 2  2x10   2x10  2x10            Cervical Retraction with OP   2 x 10  10x 2  2x10  2x10  2x10           Cervical Retraction Extension          2x10  2x10                                                                                                                                                                                                                                                                                         Modalities  5/8  5/15  5/25  5/30  6/8             Cervical traction IM 8' 16-18# max 5# min  IM 10' 20# max- 5# min  IM 10' 20# max  5#  IM 10' max- 20# max min 5# M 10' max- 20# max min 5#                                                                  Assessment:  Patient continues to experience a catch when reaching up actively and during PROM, however reduction in pain and catching with slight disctraction  Patient continues to experience pain with flexion and abduction also reduced following cervical retractions  Patient reminded to continue with cervical tractions at home  No increase in sx post session  Patient would benefit from continued  PT in order to improve right shoulder motion and reduce pain  Plan: Continue per plan of care

## 2018-06-15 ENCOUNTER — OFFICE VISIT (OUTPATIENT)
Dept: PHYSICAL THERAPY | Facility: CLINIC | Age: 53
End: 2018-06-15
Payer: COMMERCIAL

## 2018-06-15 DIAGNOSIS — M25.511 RIGHT SHOULDER PAIN, UNSPECIFIED CHRONICITY: ICD-10-CM

## 2018-06-15 DIAGNOSIS — M25.511 CHRONIC RIGHT SHOULDER PAIN: Primary | ICD-10-CM

## 2018-06-15 DIAGNOSIS — G89.29 CHRONIC RIGHT SHOULDER PAIN: Primary | ICD-10-CM

## 2018-06-15 PROCEDURE — 97140 MANUAL THERAPY 1/> REGIONS: CPT | Performed by: PHYSICAL THERAPIST

## 2018-06-15 PROCEDURE — 97110 THERAPEUTIC EXERCISES: CPT | Performed by: PHYSICAL THERAPIST

## 2018-06-15 NOTE — PROGRESS NOTES
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, such as wood working      Objective: See treatment diary below     Subjective: Upon presentation patient reports soreness in shoulder       Objective: See treatment diary below  Manual  5/8  5/15  5/25  5/30  6/8  6/13  6/14         Shoulder PROM NV  TK  db JK  TK  JK            modified median nerve glides    TK db  JK                Cervical distraction retraction extension         TK              Cervical grade II and II cerivcal mobs             TK                                       Exercise Diary  5/8  5/15  5/25  5/30  6/8  6/13  6/15         Wall Walks 10x  NP      10x  10x  10x          Pulley's    5 min  5 min  5 min  5 min  5 min    5 min            side lying ER to neutral (no weight)   2 x 10  10x2  10x2  10x2  10x2  10x2          FRO   2 x 10 10x 2  10x2  10x2  2x10   2x10          Supine cane AAROM flexion   2  X 10 20x  20x   20x  20x   20x          Side lying abd to 90 degrees   2 x 10 10x2  2x10   2x10  2x10  2x10          Cervical Retractoin   2 x 10  10x 2  2x10   2x10  2x10   2x10          Cervical Retraction with OP   2 x 10  10x 2  2x10  2x10  2x10  2x10         Cervical Retraction Extension          2x10  2x10   2x10          Cerivcal Retraction Extension with rotation             2 x 10                                                                                                                                                                                                                                                               Modalities 5/8  5/15  5/25  5/30  6/8             Cervical traction IM 8' 16-18# max 5# min  IM 10' 20# max- 5# min  IM 10' 20# max  5#  IM 10' max- 20# max min 5# M 10' max- 20# max min 5#                                                                    Assessment:  Patient continues to experience a catch when reaching up actively and during PROM, however reduction in pain and catching with slight disctraction  Patient continues to experience pain with flexion and abduction also reduced following cervical retractions  Patient reminded to continue with cervical tractions at home  No increase in sx post session  Patient would benefit from continued  PT in order to improve right shoulder motion and reduce pain  Plan: Continue per plan of care

## 2018-06-19 ENCOUNTER — OFFICE VISIT (OUTPATIENT)
Dept: OBGYN CLINIC | Facility: CLINIC | Age: 53
End: 2018-06-19

## 2018-06-19 VITALS
BODY MASS INDEX: 31.45 KG/M2 | HEIGHT: 67 IN | HEART RATE: 67 BPM | WEIGHT: 200.4 LBS | DIASTOLIC BLOOD PRESSURE: 90 MMHG | SYSTOLIC BLOOD PRESSURE: 133 MMHG

## 2018-06-19 DIAGNOSIS — M25.511 RIGHT SHOULDER PAIN, UNSPECIFIED CHRONICITY: ICD-10-CM

## 2018-06-19 DIAGNOSIS — Z48.89 AFTERCARE FOLLOWING SURGERY: Primary | ICD-10-CM

## 2018-06-19 PROCEDURE — 99024 POSTOP FOLLOW-UP VISIT: CPT | Performed by: ORTHOPAEDIC SURGERY

## 2018-06-19 NOTE — PROGRESS NOTES
Diagnoses and all orders for this visit:    Aftercare following surgery    Right shoulder pain, unspecified chronicity  -     Ambulatory referral to Orthopedic Surgery; Future        Assessment:   Status post right shoulder diagnostic arthroscopy and subacromial bursectomy 04/26/2018      Plan:     The patient should see his PCP about shortness of breath  I discussed the case with Dr Tania Sweet  The patient continues to have symptoms of right shoulder pain and Physical Therapy has helped his ROM, but not his pain  We will refer to our partner, Dr Jonathon Taylor, for further evaluation  The patient would like to continue with Physical Therapy as it is helping his shoulder ROM  CHIEF COMPLAINT:  Chief Complaint   Patient presents with    Right Shoulder - Follow-up         SUBJECTIVE:  Aiden Padilla is a 46y o  year old male who presents for follow up after Status post right shoulder diagnostic arthroscopy and subacromial bursectomy 04/26/2018  He continues to have shoulder pain that is worse than before surgery that sometimes radiated down his upper arm, but never goes past his elbow  He continues to go to Physical Therapy  He denies numbness or tingling  ROS:   General: no fever, no chills  Respiratory:  No coughing or wheezing   + SOB   Cardiovascular:  No chest pain, no palpitations  Neurological:  No headaches, no confusion  Review of all other systems is negative    PHYSICAL EXAMINATION:  General: well developed and well nourished, alert, oriented times 3 and appears comfortable  Psychiatric: Normal    MUSCULOSKELETAL EXAMINATION RUE:   Incision: healed    Patient still has positive impingement test   + tenderness over the bicipital groove    +  discomfort with internal rotation against resistance and empty can test   No sulcus sign        There is discomfort with abduction greater than 80 and forward flexion greater than 90, however the patient can do wall walks in both FF and Abduction to 120 degrees  Strength is 5/5         STUDIES REVIEWED:  No Studies to review      PROCEDURES PERFORMED:  Procedures  No Procedures performed today

## 2018-06-20 ENCOUNTER — OFFICE VISIT (OUTPATIENT)
Dept: PHYSICAL THERAPY | Facility: CLINIC | Age: 53
End: 2018-06-20
Payer: COMMERCIAL

## 2018-06-20 DIAGNOSIS — G89.29 CHRONIC RIGHT SHOULDER PAIN: Primary | ICD-10-CM

## 2018-06-20 DIAGNOSIS — M75.41 IMPINGEMENT SYNDROME OF RIGHT SHOULDER: ICD-10-CM

## 2018-06-20 DIAGNOSIS — M25.511 CHRONIC RIGHT SHOULDER PAIN: Primary | ICD-10-CM

## 2018-06-20 DIAGNOSIS — M25.511 RIGHT SHOULDER PAIN, UNSPECIFIED CHRONICITY: ICD-10-CM

## 2018-06-20 PROCEDURE — G8984 CARRY CURRENT STATUS: HCPCS | Performed by: PHYSICAL THERAPIST

## 2018-06-20 PROCEDURE — 97110 THERAPEUTIC EXERCISES: CPT | Performed by: PHYSICAL THERAPIST

## 2018-06-20 PROCEDURE — G8985 CARRY GOAL STATUS: HCPCS | Performed by: PHYSICAL THERAPIST

## 2018-06-20 PROCEDURE — 97140 MANUAL THERAPY 1/> REGIONS: CPT | Performed by: PHYSICAL THERAPIST

## 2018-06-20 NOTE — PROGRESS NOTES
PT Re-Evaluation     Today's date: 2018  Patient name: Selene Steiner  : 1965  MRN: [id number]  Referring provider: Chance Ridley MD  Dx:   Encounter Diagnosis     ICD-10-CM    1  Chronic right shoulder pain M25 511     G89 29    2  Right shoulder pain, unspecified chronicity M25 511                   Assessment  Impairments: abnormal or restricted ROM, activity intolerance, impaired physical strength, lacks appropriate home exercise program and pain with function    Assessment details: Patient has demonstrated improved right shoulder ROM and right strength since initiating physical therapy  This allowed for improved tolerance to light ADL's  Patient continues to have pain in right shoulder that limits his ability to complete heavier chores such as yard work and recreational activities such as woodworking  He will continue to benefit from PT in order to continue to improve right shoulder AROM, PROM, and strength as well as decrease pain in order to maximize return to PLOF  Understanding of Dx/Px/POC: good   Prognosis: good    Goals  STG  1  Decrease pain by at least two subjective ratings in 4 weeks- ongoing  2  Increase ROM by at least 5 degrees in 4 weeks- met  LTG  1  Independent with HEP- ongoing  2  In 8 weeks patient will be able to reach overhead without limitations- partially met( able to reach overhead with pain)  3  In 8 weeks patient will be able to lift a gallon of milk without limitations- not met  4    In 8 weeks patient will be able to complete household chores without limitations- not met (avoiding heavy chores)      Plan  Patient would benefit from: skilled PT  Planned modality interventions: cryotherapy  Planned therapy interventions: flexibility, graded exercise, home exercise program, therapeutic exercise, strengthening, stretching, patient education, neuromuscular re-education, manual therapy, joint mobilization, IADL retraining, functional ROM exercises and activity modification  Frequency: 2x week  Duration in weeks: 6  Treatment plan discussed with: patient        Subjective Evaluation    History of Present Illness  Mechanism of injury: Patient report that he has noticed an overall improvement in right shoulder ROM since starting physical therapy  Patient reports that this has allowed him to complete light ADL's  Patient reports that he still not able to complete woodworking and yardwork since starting physical therapy Patient is able to manage gallon of milk below shoulder height, however has difficulty lifting due to weakness and pain  Patient reports that he has improved 50-60% since starting physical therapy  Pain  Current pain ratin  At best pain ratin  At worst pain ratin  Quality: knife-like    Hand dominance: right      Diagnostic Tests  X-ray: normal        Objective     Tenderness     Left Shoulder   Tenderness in the acromion and biceps tendon (proximal)  Active Range of Motion     Right Shoulder   Flexion: 135 degrees   Abduction: 125 degrees   External rotation BTH: C7     Additional Active Range of Motion Details   IR Behind the back motion is to the hip     Passive Range of Motion   Left Shoulder   Extension: with pain    Right Shoulder   Flexion: 158 degrees   Abduction: 163 degrees   External rotation 90°: 60 degrees   Internal rotation 90°: 15 degrees     Scapular Mobility     Right Shoulder   Scapular Dyskinesis: grade I    Strength/Myotome Testing     Right Shoulder     Planes of Motion   Flexion: 4   Abduction: 4-   External rotation at 0°: 4   Internal rotation at 0°: 4     Tests     Right Shoulder   Positive belly press, empty can, Hawkin's, painful arc, Andrew's and scapular assistance           Precautions: HTN       Objective: See treatment diary below  Manual  5/8  5/15  5/25  5/30  6/8  6/13  6/14  6/20       Shoulder PROM NV  TK  db JK  TK  JK    TK        modified median nerve glides    TK lisa MATUTE                Cervical distraction retraction extension         TK              Cervical grade II and II cerivcal mobs             TK                                       Exercise Diary  5/8  5/15  5/25  5/30  6/8  6/13  6/15  6/20       Wall Walks 10x  NP      10x  10x  10x  10x        Pulley's    5 min  5 min  5 min  5 min  5 min    5 min    5 min        side lying ER to neutral (no weight)   2 x 10  10x2  10x2  10x2  10x2  10x2  10x2        FRO   2 x 10 10x 2  10x2  10x2  2x10   2x10  2x10        Supine cane AAROM flexion   2  X 10 20x  20x   20x  20x   20x  20x        Side lying abd to 90 degrees   2 x 10 10x2  2x10   2x10  2x10  2x10  2x10        Cervical Retractoin   2 x 10  10x 2  2x10   2x10  2x10   2x10  2x10        Cervical Retraction with OP   2 x 10  10x 2  2x10  2x10  2x10  2x10  2x10       Cervical Retraction Extension          2x10  2x10   2x10  2x10        Cerivcal Retraction Extension with rotation             2 x 10  2x10        Theraband ER/IR               YTB 2 x 10        Theraband Row               YTB 2 x 10                                                                                                                                                                                                             Modalities  5/8  5/15  5/25  5/30  6/8             Cervical traction IM 8' 16-18# max 5# min  IM 10' 20# max- 5# min  IM 10' 20# max  5#  IM 10' max- 20# max min 5# M 10' max- 20# max min 5#

## 2018-06-22 ENCOUNTER — OFFICE VISIT (OUTPATIENT)
Dept: PHYSICAL THERAPY | Facility: CLINIC | Age: 53
End: 2018-06-22
Payer: COMMERCIAL

## 2018-06-22 DIAGNOSIS — G89.29 CHRONIC RIGHT SHOULDER PAIN: Primary | ICD-10-CM

## 2018-06-22 DIAGNOSIS — M75.41 IMPINGEMENT SYNDROME OF RIGHT SHOULDER: ICD-10-CM

## 2018-06-22 DIAGNOSIS — M25.511 RIGHT SHOULDER PAIN, UNSPECIFIED CHRONICITY: ICD-10-CM

## 2018-06-22 DIAGNOSIS — M25.511 CHRONIC RIGHT SHOULDER PAIN: Primary | ICD-10-CM

## 2018-06-22 PROCEDURE — 97110 THERAPEUTIC EXERCISES: CPT

## 2018-06-22 PROCEDURE — 97140 MANUAL THERAPY 1/> REGIONS: CPT

## 2018-06-22 PROCEDURE — 97112 NEUROMUSCULAR REEDUCATION: CPT

## 2018-06-22 NOTE — PROGRESS NOTES
Daily Note     Today's date: 2018  Patient name: Jorge Collins  : 1965  MRN: [id number]  Referring provider: Rogers Soriano MD  Dx:   Encounter Diagnosis     ICD-10-CM    1  Chronic right shoulder pain M25 511     G89 29    2  Right shoulder pain, unspecified chronicity M25 511    3  Impingement syndrome of right shoulder M75 41                   Subjective: Upon presentation patient reports he has not been able to do his HEP the last two days therefore no change in shoulder         Objective: See treatment diary below  Manual  5/8  5/15  5/25  5/30  6/8  6/13  6/14  6/20  6/22     Shoulder PROM NV  TK  db JK  TK  JK    TK  8 min       modified median nerve glides    TK db  JK                Cervical distraction retraction extension         TK              Cervical grade II and II cerivcal mobs             TK                                       Exercise Diary  5/8  5/15  5/25  5/30  6/8  6/13  6/15  6/20  6/22     Wall Walks 10x  NP      10x  10x  10x  10x  10x      Pulley's    5 min  5 min  5 min  5 min  5 min    5 min    5 min  5 min      side lying ER to neutral (no weight)   2 x 10  10x2  10x2  10x2  10x2  10x2  10x2  1# 2x10      FRO   2 x 10 10x 2  10x2  10x2  2x10   2x10  2x10        Supine cane AAROM flexion   2  X 10 20x  20x   20x  20x   20x  20x  20x      Side lying abd to 90 degrees   2 x 10 10x2  2x10   2x10  2x10  2x10  2x10  1# 2x10      Cervical Retractoin   2 x 10  10x 2  2x10   2x10  2x10   2x10  2x10  2x10      Cervical Retraction with OP   2 x 10  10x 2  2x10  2x10  2x10  2x10  2x10  2x10     Cervical Retraction Extension          2x10  2x10   2x10  2x10  2x10      Cerivcal Retraction Extension with rotation             2 x 10  2x10  2x10      Theraband ER/IR               YTB 2 x 10 YTB 2x10      Theraband Row               YTB 2 x 10  YTB 2x10                                                                                                                                                                                                           Modalities  5/8  5/15  5/25  5/30  6/8             Cervical traction IM 8' 16-18# max 5# min  IM 10' 20# max- 5# min  IM 10' 20# max  5#  IM 10' max- 20# max min 5# M 10' max- 20# max min 5#                                                                   Assessment: Patient demonstrates good tolerance to progression in TE this visit, no complaints offered  Verbal cuing utilized to facilitate proper technique throughout exercise program  Patient would benefit from continued therapy to improve R shoulder  mobility, increase strength, and return to prior level of functioning  Plan: Continue per plan of care

## 2018-06-27 ENCOUNTER — OFFICE VISIT (OUTPATIENT)
Dept: PHYSICAL THERAPY | Facility: CLINIC | Age: 53
End: 2018-06-27
Payer: COMMERCIAL

## 2018-06-27 DIAGNOSIS — G89.29 CHRONIC RIGHT SHOULDER PAIN: Primary | ICD-10-CM

## 2018-06-27 DIAGNOSIS — M25.511 CHRONIC RIGHT SHOULDER PAIN: Primary | ICD-10-CM

## 2018-06-27 DIAGNOSIS — M75.41 IMPINGEMENT SYNDROME OF RIGHT SHOULDER: ICD-10-CM

## 2018-06-27 PROCEDURE — 97110 THERAPEUTIC EXERCISES: CPT | Performed by: PHYSICAL THERAPIST

## 2018-06-27 PROCEDURE — 97140 MANUAL THERAPY 1/> REGIONS: CPT | Performed by: PHYSICAL THERAPIST

## 2018-06-27 NOTE — PROGRESS NOTES
Daily Note     Today's date: 2018  Patient name: Leslie Flores  : 1965  MRN: [id number]  Referring provider: Solo Robledo MD  Dx:   Encounter Diagnosis     ICD-10-CM    1  Chronic right shoulder pain M25 511     G89 29    2  Impingement syndrome of right shoulder M75 41                   Subjective: Patient reports that he has noticed an improvement in his shoulder pain  Patient he states that he is able to reach up over head without pain and reach up and out to the side (abduction) with only pain at end range         Objective: See treatment diary below  Objective: See treatment diary below  Manual  5/8  5/15  5/25  5/30  6/8  6/13  6/14  6/20  6/22  6/26   Shoulder PROM NV  TK  db JK  TK  JK    TK  8 min   TK    modified median nerve glides    TK db  JK            TK    Cervical distraction retraction extension         TK              Cervical grade II and II cerivcal mobs             TK                                       Exercise Diary  5/8  5/15  5/25  5/30  6/8  6/13  6/15  6/20  6/22     Wall Walks 10x  NP      10x  10x  10x  10x  10x  10x    Pulley's    5 min  5 min  5 min  5 min  5 min    5 min    5 min  5 min  5 min    side lying ER to neutral (no weight)   2 x 10  10x2  10x2  10x2  10x2  10x2  10x2  1# 2x10   1# 2x10    FRO   2 x 10 10x 2  10x2  10x2  2x10   2x10  2x10    2x10    Supine cane AAROM flexion   2  X 10 20x  20x   20x  20x   20x  20x  20x  20x    Side lying abd to 90 degrees   2 x 10 10x2  2x10   2x10  2x10  2x10  2x10  1# 2x10  1# 2x10    Cervical Retractoin   2 x 10  10x 2  2x10   2x10  2x10   2x10  2x10  2x10   2x10    Cervical Retraction with OP   2 x 10  10x 2  2x10  2x10  2x10  2x10  2x10  2x10  2x10   Cervical Retraction Extension          2x10  2x10   2x10  2x10  2x10   2x10    Cerivcal Retraction Extension with rotation             2 x 10  2x10  2x10   2x10    Theraband ER/IR               YTB 2 x 10 YTB 2x10  YTB 2x10    Theraband Row               YTB 2 x 10  YTB 2x10  YTB 2x10    No monies                   YTB 2x10                                                                                                                                                                                 Modalities  5/8  5/15  5/25  5/30  6/8             Cervical traction IM 8' 16-18# max 5# min  IM 10' 20# max- 5# min  IM 10' 20# max  5#  IM 10' max- 20# max min 5# M 10' max- 20# max min 5#                                                                    Assessment: Patient demonstrated improved right shoulder motion into flexion and abduction as he was able to complete without midrange pain  Patient continues to have pain with right shoulder AROM and PROM external rotation  Patient also continues to demonstrate decreased right UE median nerve mobility  He will continue to benefit from PT in order to improve right shoulder ROM and median nerve mobility  Plan: Continue per plan of care

## 2018-06-29 ENCOUNTER — OFFICE VISIT (OUTPATIENT)
Dept: PHYSICAL THERAPY | Facility: CLINIC | Age: 53
End: 2018-06-29
Payer: COMMERCIAL

## 2018-06-29 DIAGNOSIS — G89.29 CHRONIC RIGHT SHOULDER PAIN: Primary | ICD-10-CM

## 2018-06-29 DIAGNOSIS — M75.41 IMPINGEMENT SYNDROME OF RIGHT SHOULDER: ICD-10-CM

## 2018-06-29 DIAGNOSIS — M25.511 CHRONIC RIGHT SHOULDER PAIN: Primary | ICD-10-CM

## 2018-06-29 DIAGNOSIS — M25.511 RIGHT SHOULDER PAIN, UNSPECIFIED CHRONICITY: ICD-10-CM

## 2018-06-29 PROCEDURE — 97110 THERAPEUTIC EXERCISES: CPT

## 2018-06-29 PROCEDURE — 97140 MANUAL THERAPY 1/> REGIONS: CPT

## 2018-06-29 NOTE — PROGRESS NOTES
Daily Note     Today's date: 2018  Patient name: Efrem Chatman  : 1965  MRN: [id number]  Referring provider: Scott Mejia MD  Dx:   Encounter Diagnosis     ICD-10-CM    1  Chronic right shoulder pain M25 511     G89 29    2  Impingement syndrome of right shoulder M75 41    3  Right shoulder pain, unspecified chronicity M25 511                   Subjective: Patient repots pain when sleeping he experiences "8/10" pain  However patient repots he has has a reduction in pain when actively moving the shoulder since attending therapy            Objective: See treatment diary below  Manual  6/29  5/15  5/25  5/30  6/8  6/13  6/14  6/20  6/22  6/26   Shoulder PROM 5min  TK  db JK  TK  JK    TK  8 min   TK    modified median nerve glides   3 min TK db  JK            TK    Cervical distraction retraction extension         TK              Cervical grade II and II cerivcal mobs             TK                                       Exercise Diary  6/29  5/15  5/25  5/30  6/8  6/13  6/15  6/20  6/22  6/26   Wall Walks 10x  NP      10x  10x  10x  10x  10x  10x    Pulley's   5 min 5 min  5 min  5 min  5 min  5 min    5 min    5 min  5 min  5 min    side lying ER to neutral (no weight)  1# 2x10 2 x 10  10x2  10x2  10x2  10x2  10x2  10x2  1# 2x10   1# 2x10    FRO   2 x 10 10x 2  10x2  10x2  2x10   2x10  2x10    2x10    Supine cane AAROM flexion  20x 2  X 10 20x  20x   20x  20x   20x  20x  20x  20x    Side lying abd to 90 degrees  1# 2x10 2 x 10 10x2  2x10   2x10  2x10  2x10  2x10  1# 2x10  1# 2x10    Cervical Retractoin  2x10 2 x 10  10x 2  2x10   2x10  2x10   2x10  2x10  2x10   2x10    Cervical Retraction with OP  2x10 2 x 10  10x 2  2x10  2x10  2x10  2x10  2x10  2x10  2x10   Cervical Retraction Extension  2x10        2x10  2x10   2x10  2x10  2x10   2x10    Cerivcal Retraction Extension with rotation  2x10           2 x 10  2x10  2x10   2x10    Theraband ER/IR  YTB 2x10             YTB 2 x 10 YTB 2x10  YTB 2x10  Theraband Row  [id number]             YTB 2 x 10  YTB 2x10  YTB 2x10    No monies  YTB 2x10                 YTB 2x10                                                                                                                                                                                 Modalities  5/8  5/15  5/25  5/30  6/8             Cervical traction IM 8' 16-18# max 5# min  IM 10' 20# max- 5# min  IM 10' 20# max  5#  IM 10' max- 20# max min 5# M 10' max- 20# max min 5#                                                                    Assessment: Patient demonstrated improvements in  flexion and abduction, no longer experiencing midrange pain however end range pain is present  Painful ER persists  Patient offers no increase in sx post session  Patient would benefit from continued thearpy to improve right shoulder ROM and median nerve mobility  Plan: Continue per plan of care

## 2018-07-06 ENCOUNTER — OFFICE VISIT (OUTPATIENT)
Dept: PHYSICAL THERAPY | Facility: CLINIC | Age: 53
End: 2018-07-06
Payer: COMMERCIAL

## 2018-07-06 DIAGNOSIS — M25.511 CHRONIC RIGHT SHOULDER PAIN: Primary | ICD-10-CM

## 2018-07-06 DIAGNOSIS — M25.511 RIGHT SHOULDER PAIN, UNSPECIFIED CHRONICITY: ICD-10-CM

## 2018-07-06 DIAGNOSIS — G89.29 CHRONIC RIGHT SHOULDER PAIN: Primary | ICD-10-CM

## 2018-07-06 DIAGNOSIS — M75.41 IMPINGEMENT SYNDROME OF RIGHT SHOULDER: ICD-10-CM

## 2018-07-06 PROCEDURE — 97110 THERAPEUTIC EXERCISES: CPT

## 2018-07-06 PROCEDURE — 97140 MANUAL THERAPY 1/> REGIONS: CPT

## 2018-07-06 NOTE — PROGRESS NOTES
Daily Note     Today's date: 2018  Patient name: Narendra Cespedes  : 1965  MRN: [id number]  Referring provider: Jennifer Storey MD  Dx:   Encounter Diagnosis     ICD-10-CM    1  Chronic right shoulder pain M25 511     G89 29    2  Impingement syndrome of right shoulder M75 41    3  Right shoulder pain, unspecified chronicity M25 511                   Subjective: Upon presentation patient reports he is feeling "much better" however patient reports he slept on his neck wrong  Patient reports he increased his weights at home and is now able to complete HEP with 3# weights       Objective: See treatment diary below  Manual     Shoulder PROM 5min 8 min  db JK  TK  JK    TK  8 min   TK    modified median nerve glides   3 min 3 min db  JK            TK    Cervical distraction retraction extension         TK              Cervical grade II and II cerivcal mobs             TK                                       Exercise Diary  6/29  7/6  5/25  5/30  6/8  6/13  6/15  6/20  6/22  6/26   Wall Walks 10x  NP      10x  10x  10x  10x  10x  10x    Pulley's   5 min 5 min  5 min  5 min  5 min  5 min    5 min    5 min  5 min  5 min    side lying ER to neutral (no weight)  1# 2x10 2 x 10 3#  10x2  10x2  10x2  10x2  10x2  10x2  1# 2x10   1# 2x10    FRO   2 x 10 10x 2  10x2  10x2  2x10   2x10  2x10    2x10    Supine cane AAROM flexion  20x 2  X 10 1# 20x  20x   20x  20x   20x  20x  20x  20x    Side lying abd to 90 degrees  1# 2x10 2 x 10 3# 10x2  2x10   2x10  2x10  2x10  2x10  1# 2x10  1# 2x10    Cervical Retractoin  2x10 2 x 10  10x 2  2x10   2x10  2x10   2x10  2x10  2x10   2x10    Cervical Retraction with OP  2x10 2 x 10  10x 2  2x10  2x10  2x10  2x10  2x10  2x10  2x10   Cervical Retraction Extension  2x10  2x10      2x10  2x10   2x10  2x10  2x10   2x10    Cerivcal Retraction Extension with rotation  2x10  2x10         2 x 10  2x10  2x10   2x10    Theraband ER/IR  YTB 2x10  RTB 2x10           YTB 2 x 10 YTB 2x10  YTB 2x10    Theraband Row  [id number]  RTB 2x10           YTB 2 x 10  YTB 2x10  YTB 2x10    No monies  YTB 2x10  RTB 2x10               YTB 2x10                                                                                                                                                                                 Modalities  5/8  5/15  5/25  5/30  6/8             Cervical traction IM 8' 16-18# max 5# min  IM 10' 20# max- 5# min  IM 10' 20# max  5#  IM 10' max- 20# max min 5# M 10' max- 20# max min 5#                                                                    Assessment: Patient demonstrated improvements in  flexion and abduction, no longer experiencing midrange or end range pain  Improvements in ER passively and actively  Patient able to tolerate increase in resistance t/o TE program with no complaints of pain  Patient offers no increase in sx post session  Patient would benefit from continued thearpy to improve right shoulder ROM and median nerve mobility  Plan: Continue per plan of care

## 2018-07-18 ENCOUNTER — OFFICE VISIT (OUTPATIENT)
Dept: PHYSICAL THERAPY | Facility: CLINIC | Age: 53
End: 2018-07-18
Payer: COMMERCIAL

## 2018-07-18 DIAGNOSIS — M25.511 CHRONIC RIGHT SHOULDER PAIN: Primary | ICD-10-CM

## 2018-07-18 DIAGNOSIS — M25.511 RIGHT SHOULDER PAIN, UNSPECIFIED CHRONICITY: ICD-10-CM

## 2018-07-18 DIAGNOSIS — G89.29 CHRONIC RIGHT SHOULDER PAIN: Primary | ICD-10-CM

## 2018-07-18 DIAGNOSIS — M75.41 IMPINGEMENT SYNDROME OF RIGHT SHOULDER: ICD-10-CM

## 2018-07-18 PROCEDURE — 97140 MANUAL THERAPY 1/> REGIONS: CPT

## 2018-07-18 PROCEDURE — 97112 NEUROMUSCULAR REEDUCATION: CPT

## 2018-07-18 PROCEDURE — 97110 THERAPEUTIC EXERCISES: CPT

## 2018-07-18 NOTE — PROGRESS NOTES
Daily Note     Today's date: 2018  Patient name: Veto Mendoza  : 1965  MRN: [id number]  Referring provider: Raven Sandoval MD  Dx:   Encounter Diagnosis     ICD-10-CM    1  Chronic right shoulder pain M25 511     G89 29    2  Impingement syndrome of right shoulder M75 41    3  Right shoulder pain, unspecified chronicity M25 511                   Subjective: Upon presentation patient reports his overall pain levels have decreased, patient repots he has been completing his HEP daily and has a good response  Patient repots his pain levels at the highest range to a 6, and can be a 0       Objective: See treatment diary below  Manual     Shoulder PROM 5min 8 min 8 min JK  TK  JK    TK  8 min   TK    modified median nerve glides   3 min 3 min 3 min  JK            TK    Cervical distraction retraction extension         TK              Cervical grade II and II cerivcal mobs             TK                                       Exercise Diary  6/29  7/6  7/18  5/30  6/8  6/13  6/15  6/20  6/22  6/26   Wall Walks 10x  NP  NP  5 min  10x  10x  10x  10x  10x  10x    Pulley's   5 min 5 min  5 min  5 min  5 min  5 min    5 min    5 min  5 min  5 min    side lying ER to neutral   1# 2x10 2 x 10 3#  10x2 3#  10x2  10x2  10x2  10x2  10x2  1# 2x10   1# 2x10    FRO   2 x 10 10x 2  10x2  10x2  2x10   2x10  2x10    2x10    Supine cane AAROM flexion  20x 2  X 10 1# 20x 1#  20x   20x  20x   20x  20x  20x  20x    Side lying abd to 90 degrees  1# 2x10 2 x 10 3# 2x10 3#  2x10   2x10  2x10  2x10  2x10  1# 2x10  1# 2x10    Cervical Retractoin  2x10 2 x 10  10x 2  2x10   2x10  2x10   2x10  2x10  2x10   2x10    Cervical Retraction with OP  2x10 2 x 10  10x 2  2x10  2x10  2x10  2x10  2x10  2x10  2x10   Cervical Retraction Extension  2x10  2x10  2x10    2x10  2x10   2x10  2x10  2x10   2x10    Cerivcal Retraction Extension with rotation  2x10  2x10  2x10       2 x 10  2x10  2x10   2x10    Theraband ER/IR  YTB 2x10  RTB 2x10  RTB 2x10         YTB 2 x 10 YTB 2x10  YTB 2x10    Theraband Row  [id number]  RTB 2x10  RTB 2x10         YTB 2 x 10  YTB 2x10  YTB 2x10    No monies  YTB 2x10  RTB 2x10  RTB 2x10             YTB 2x10    T-band extensions      RTB 2x10                                                                                                                                                                       Modalities  5/8  5/15  5/25  5/30  6/8             Cervical traction IM 8' 16-18# max 5# min  IM 10' 20# max- 5# min  IM 10' 20# max  5#  IM 10' max- 20# max min 5# M 10' max- 20# max min 5#                                                                    Assessment: Patient continues to demonstrate improvements in  flexion and abduction, did not experience any midrange or end range pain this visit  Improvements in ER passively and actively persisting  Patient offers no increase in sx post session  Patient would benefit from continued thearpy to improve right shoulder ROM and median nerve mobility  Plan: Continue per plan of care

## 2018-07-19 ENCOUNTER — TRANSCRIBE ORDERS (OUTPATIENT)
Dept: ADMINISTRATIVE | Facility: HOSPITAL | Age: 53
End: 2018-07-19

## 2018-07-19 ENCOUNTER — OFFICE VISIT (OUTPATIENT)
Dept: OBGYN CLINIC | Facility: OTHER | Age: 53
End: 2018-07-19

## 2018-07-19 VITALS
WEIGHT: 190 LBS | SYSTOLIC BLOOD PRESSURE: 136 MMHG | BODY MASS INDEX: 29.76 KG/M2 | DIASTOLIC BLOOD PRESSURE: 94 MMHG | HEART RATE: 56 BPM

## 2018-07-19 DIAGNOSIS — S43.431D SUPERIOR GLENOID LABRUM LESION OF RIGHT SHOULDER, SUBSEQUENT ENCOUNTER: Primary | ICD-10-CM

## 2018-07-19 PROCEDURE — 99024 POSTOP FOLLOW-UP VISIT: CPT | Performed by: ORTHOPAEDIC SURGERY

## 2018-07-19 NOTE — PROGRESS NOTES
Assessment  Diagnoses and all orders for this visit:    Superior glenoid labrum lesion of right shoulder, subsequent encounter  -     MRI arthrogram right shoulder; Future  -     FL arthrogram shoulder right; Future      Discussion and Plan:    - Discussed with patient that today's physical exam is consistent with suspected SLAP tear of the right shoulder    - MRI Arthrogram to further evaluate and the [address] joint capsule and assess for possible labral pathology  - Continue with ice and NSAIDs/Tylenol prn for pain control  - Return after MRI is complete, for MRI Review, Re-evaluation  Subjective:   Patient ID: Jeri Hansen is a 46 y o  RHD male      Patient presents today with chief complaint of ongoing right shoulder pain S/P diagnostic arthroscopy with subacromial bursectomy of the right shoulder performed 4/26/2018 within this practice by Dr Leona Dietrich  He reports that he has been consistent with formal physical therapy as per protocol, and although his range of motion and strength has improved since surgery he continues to have pain with overhead motion, external rotation, and internal rotation  He describes his pain as anterior and deep, sharp with certain motions, 1/01 pain at worst   He denies any numbness, tingling, or radiating symptoms  He denies any postsurgical bruising, swelling, or feelings of instability  He is currently taking 2 medications in regards to arthritis, but he does not recall the names of said medications at time of visit  The following portions of the patient's history were reviewed and updated as appropriate: allergies, current medications, past family history, past medical history, past social history, past surgical history and problem list     Review of Systems   Constitutional: Negative for chills, fever and unexpected weight change  HENT: Negative for hearing loss, nosebleeds and sore throat  Eyes: Negative for pain, redness and visual disturbance     Respiratory: Negative for cough, shortness of breath and wheezing  Cardiovascular: Negative for chest pain, palpitations and leg swelling  Gastrointestinal: Negative for abdominal pain, nausea and vomiting  Endocrine: Negative for polydipsia and polyuria  Genitourinary: Negative for dysuria and hematuria  Musculoskeletal:        As noted in HPI   Skin: Negative for rash and wound  Neurological: Negative for dizziness, numbness and headaches  Psychiatric/Behavioral: Negative for decreased concentration and suicidal ideas  The patient is not nervous/anxious  Objective:    Vitals:    07/19/18 0758   BP: 136/94   BP Location: Left arm   Patient Position: Sitting   Cuff Size: Adult   Pulse: 56   Weight: 86 2 kg (190 lb)       Right Shoulder Exam     Tenderness   The patient is experiencing tenderness in the biceps tendon  Range of Motion   Active Abduction:                       170  Passive Abduction:                    170  Forward Flexion:                        170  External Rotation:                      60  Internal Rotation 0 degrees:      Sacrum  Internal Rotation 90 degrees:    80    Muscle Strength   External Rotation: 4/5  Supraspinatus:     5/5  Subscapularis:     5/5    Tests   Breaux:          Positive  Cross Arm:      Positive  Apprehension: Positive    Comments:  Rounded shoulder posture laterally  No gross deformity noted  Capsular tightness and pain noted with passive external and internal rotation  Painful  Resisted ext rot  Painful resisted supination    + O'Aditya's  + Speed's             Physical Exam   Constitutional: He is oriented to person, place, and time  He appears well-developed and well-nourished  HENT:   Right Ear: External ear normal    Left Ear: External ear normal    Nose: Nose normal    Eyes: Conjunctivae and EOM are normal  Pupils are equal, round, and reactive to light  Neck: Normal range of motion  Cardiovascular: Intact distal pulses      Pulmonary/Chest: Effort normal    Musculoskeletal: Normal range of motion  Neurological: He is alert and oriented to person, place, and time  Skin: Skin is warm and dry  Psychiatric: He has a normal mood and affect  His behavior is normal  Judgment and thought content normal      I have personally reviewed pertinent films in PACS and my interpretation is as follows  No imaging reviewed this visit    Scribe Attestation    I,:   Eva Dill am acting as a scribe while in the presence of the attending physician :        I,:   Yogi Couch MD personally performed the services described in this documentation    as scribed in my presence :          Patient continues to be symptomatic despite an arthroscopic subacromial bursectomy and does have signs and symptoms of long-head biceps tendon pathology possibly slap tear, in order to better understand the anatomy at this time following his procedure an MRI arthrogram is indicated so that I can assess and help him move forward with further treatment options    We will see him following the MRI arthrogram of the right shoulder

## 2018-07-20 ENCOUNTER — APPOINTMENT (OUTPATIENT)
Dept: PHYSICAL THERAPY | Facility: CLINIC | Age: 53
End: 2018-07-20
Payer: COMMERCIAL

## 2018-07-25 ENCOUNTER — OFFICE VISIT (OUTPATIENT)
Dept: PHYSICAL THERAPY | Facility: CLINIC | Age: 53
End: 2018-07-25
Payer: COMMERCIAL

## 2018-07-25 DIAGNOSIS — G89.29 CHRONIC RIGHT SHOULDER PAIN: Primary | ICD-10-CM

## 2018-07-25 DIAGNOSIS — M25.511 CHRONIC RIGHT SHOULDER PAIN: Primary | ICD-10-CM

## 2018-07-25 DIAGNOSIS — M75.41 IMPINGEMENT SYNDROME OF RIGHT SHOULDER: ICD-10-CM

## 2018-07-25 DIAGNOSIS — M25.511 RIGHT SHOULDER PAIN, UNSPECIFIED CHRONICITY: ICD-10-CM

## 2018-07-25 DIAGNOSIS — I10 HYPERTENSION, UNSPECIFIED TYPE: ICD-10-CM

## 2018-07-25 PROCEDURE — 97110 THERAPEUTIC EXERCISES: CPT | Performed by: PHYSICAL THERAPIST

## 2018-07-25 PROCEDURE — 97140 MANUAL THERAPY 1/> REGIONS: CPT | Performed by: PHYSICAL THERAPIST

## 2018-07-25 RX ORDER — LISINOPRIL 20 MG/1
20 TABLET ORAL DAILY
Qty: 90 TABLET | Refills: 1 | Status: SHIPPED | OUTPATIENT
Start: 2018-07-25 | End: 2019-04-03 | Stop reason: SDUPTHER

## 2018-07-25 RX ORDER — AMLODIPINE BESYLATE 10 MG/1
10 TABLET ORAL DAILY
Qty: 90 TABLET | Refills: 1 | Status: SHIPPED | OUTPATIENT
Start: 2018-07-25 | End: 2019-04-03 | Stop reason: SDUPTHER

## 2018-07-25 NOTE — PROGRESS NOTES
Daily Note     Today's date: 2018  Patient name: Iram Oates  : 1965  MRN: [id number]  Referring provider: Padmini Hyde MD  Dx:   Encounter Diagnosis     ICD-10-CM    1  Chronic right shoulder pain M25 511     G89 29    2  Impingement syndrome of right shoulder M75 41    3  Right shoulder pain, unspecified chronicity M25 511        Start Time: 730  Stop Time: 822  Total time in clinic (min): 52 minutes    Subjective: Patient reports that overall his pain levels have decreased since starting physical therapy  Patient reports that he has been able to reach higher with his arm  States that he is currently having difficulty lifting due pain in the shoulder         Objective: See treatment diary below  Manual     Shoulder PROM 5min 8 min 8 min TK  TK  JK    TK  8 min   TK    modified median nerve glides   3 min 3 min 3 min  TK            TK    Cervical distraction retraction extension         TK              Cervical grade II and II cerivcal mobs             TK         ER MWM    TK          Manual Tractoin         TK                     Exercise Diary  6/29  7/6  7/18  7/25  6/8  6/13  6/15  6/20  6/22  6/26   Wall Walks 10x  NP  NP  NP  10x  10x  10x  10x  10x  10x    Pulley's   5 min 5 min  5 min  5 min  5 min  5 min    5 min    5 min  5 min  5 min    side lying ER to neutral   1# 2x10 2 x 10 3#  10x2 3#  10x2 3#  10x2  10x2  10x2  10x2  1# 2x10   1# 2x10    FRO   2 x 10 10x 2  NP  10x2  2x10   2x10  2x10    2x10    Supine cane AAROM flexion  20x 2  X 10 1# 20x 1#  20x   20x  20x   20x  20x  20x  20x    Side lying abd to 90 degrees  1# 2x10 2 x 10 3# 2x10 3#  2x10 3#   2x10  2x10  2x10  2x10  1# 2x10  1# 2x10    Cervical Retractoin  2x10 2 x 10  10x 2  2x10   2x10  2x10   2x10  2x10  2x10   2x10    Cervical Retraction with OP  2x10 2 x 10  10x 2  2x10  2x10  2x10  2x10  2x10  2x10  2x10   Cervical Retraction Extension  2x10  2x10  2x10  2 x 10  2x10  2x10   2x10  2x10  2x10   2x10    Cerivcal Retraction Extension with rotation  2x10  2x10  2x10  2 x 10     2 x 10  2x10  2x10   2x10    Theraband ER/IR  YTB 2x10  RTB 2x10  RTB 2x10  RTB 2x10       YTB 2 x 10 YTB 2x10  YTB 2x10    Theraband Row  [id number]  RTB 2x10  RTB 2x10  RTB 2x10       YTB 2 x 10  YTB 2x10  YTB 2x10    No monies  YTB 2x10  RTB 2x10  RTB 2x10  RTB 2x10           YTB 2x10    T-band extensions      RTB 2x10  RTB 2x10                Bicep curls       5# 2 x 10                FTW       2 x 10                                                                                                                     Modalities  5/8  5/15  5/25  5/30  6/8             Cervical traction IM 8' 16-18# max 5# min  IM 10' 20# max- 5# min  IM 10' 20# max  5#  IM 10' max- 20# max min 5# M 10' max- 20# max min 5#                                                                   Assessment: Patient was able to reach 165 degrees of right shoulder flexion and 175 degrees of right shoulder abduction  Patient reported some pain at end range shoulder flexion  Right shoulder abduction ROM was pain free this visit  Patient reported pain at end range ER PROM  This pain was abolished following right shoulder ER MWM  HE will continue to benefit from PT in order to increase right shoulder strength and improve right shoulder pain free ROM  Plan: Continue per plan of care

## 2018-07-27 ENCOUNTER — OFFICE VISIT (OUTPATIENT)
Dept: PHYSICAL THERAPY | Facility: CLINIC | Age: 53
End: 2018-07-27
Payer: COMMERCIAL

## 2018-07-27 DIAGNOSIS — G89.29 CHRONIC RIGHT SHOULDER PAIN: Primary | ICD-10-CM

## 2018-07-27 DIAGNOSIS — M25.511 RIGHT SHOULDER PAIN, UNSPECIFIED CHRONICITY: ICD-10-CM

## 2018-07-27 DIAGNOSIS — M75.41 IMPINGEMENT SYNDROME OF RIGHT SHOULDER: ICD-10-CM

## 2018-07-27 DIAGNOSIS — M25.511 CHRONIC RIGHT SHOULDER PAIN: Primary | ICD-10-CM

## 2018-07-27 PROCEDURE — 97112 NEUROMUSCULAR REEDUCATION: CPT

## 2018-07-27 PROCEDURE — 97110 THERAPEUTIC EXERCISES: CPT

## 2018-07-27 PROCEDURE — 97140 MANUAL THERAPY 1/> REGIONS: CPT

## 2018-07-27 NOTE — PROGRESS NOTES
Daily Note     Today's date: 2018  Patient name: Altagracia Jade  : 1965  MRN: [id number]  Referring provider: Roxana Wu MD  Dx:   Encounter Diagnosis     ICD-10-CM    1  Chronic right shoulder pain M25 511     G89 29    2  Impingement syndrome of right shoulder M75 41    3  Right shoulder pain, unspecified chronicity M25 511                   Subjective: Upon presentation patient repots he is feeling "better"  No complaints offered         Objective: See treatment diary below  Manual     Shoulder PROM 5min 8 min 8 min TK 5 min  JK    TK  8 min   TK    modified median nerve glides   3 min 3 min 3 min  TK  3 min          TK    Cervical distraction retraction extension                       Cervical grade II and II cerivcal mobs             TK         ER MWM    TK          Manual Tractoin         TK  5 min                   Exercise Diary  6/29  7/6  7/18  7/25 7/27  6/13  6/15  6/20  6/22  6/26   Wall Walks 10x  NP  NP  NP  10x  10x  10x  10x  10x  10x    Pulley's   5 min 5 min  5 min  5 min  5 min  5 min    5 min    5 min  5 min  5 min    side lying ER to neutral   1# 2x10 2 x 10 3#  10x2 3#  10x2 3#  10x2  10x2  10x2  10x2  1# 2x10   1# 2x10    FRO   2 x 10 10x 2  NP  10x2  2x10   2x10  2x10    2x10    Supine cane AAROM flexion  20x 2  X 10 1# 20x 1#  20x   30x 1#  20x   20x  20x  20x  20x    Side lying abd to 90 degrees  1# 2x10 2 x 10 3# 2x10 3#  2x10 3#   2x10 3#  2x10  2x10  2x10  1# 2x10  1# 2x10    Cervical Retractoin  2x10 2 x 10  10x 2  2x10   2x10  2x10   2x10  2x10  2x10   2x10    Cervical Retraction with OP  2x10 2 x 10  10x 2  2x10  2x10  2x10  2x10  2x10  2x10  2x10   Cervical Retraction Extension  2x10  2x10  2x10  2 x 10  2x10  2x10   2x10  2x10  2x10   2x10    Cerivcal Retraction Extension with rotation  2x10  2x10  2x10  2 x 10  2x10   2 x 10  2x10  2x10   2x10    Theraband ER/IR  YTB 2x10  RTB 2x10  RTB 2x10  RTB 2x10  RTB 2x10     YTB 2 x 10 YTB 2x10  YTB 2x10    Theraband Row  [id number]  RTB 2x10  RTB 2x10  RTB 2x10  RTB 2x10     YTB 2 x 10  YTB 2x10  YTB 2x10    No monies  YTB 2x10  RTB 2x10  RTB 2x10  RTB 2x10  RTB 2x10         YTB 2x10    T-band extensions      RTB 2x10  RTB 2x10  RTB 2x10              Bicep curls       5# 2 x 10  5# 2x10              FTW       2 x 10  2x10 foam roll                                                                                                                   Modalities  5/8  5/15  5/25  5/30  6/8             Cervical traction IM 8' 16-18# max 5# min  IM 10' 20# max- 5# min  IM 10' 20# max  5#  IM 10' max- 20# max min 5# M 10' max- 20# max min 5#                                                                   Assessment: Improvements noted in median nerve mobility as well as shoulder flexion  Reduced pain levels noted at end ranges  Good tolerance noted to TE this visit, minimal vc's necessary to facilitate proper form and dosage  Patient offers no complaints post session  Patient would benefit from continued therapy to increase R shoulder strength and reduce pain  Plan: Continue per plan of care

## 2018-07-30 PROCEDURE — G8985 CARRY GOAL STATUS: HCPCS

## 2018-07-30 PROCEDURE — G8984 CARRY CURRENT STATUS: HCPCS

## 2018-08-01 ENCOUNTER — OFFICE VISIT (OUTPATIENT)
Dept: PHYSICAL THERAPY | Facility: CLINIC | Age: 53
End: 2018-08-01
Payer: COMMERCIAL

## 2018-08-01 DIAGNOSIS — M25.511 RIGHT SHOULDER PAIN, UNSPECIFIED CHRONICITY: ICD-10-CM

## 2018-08-01 DIAGNOSIS — M25.511 CHRONIC RIGHT SHOULDER PAIN: Primary | ICD-10-CM

## 2018-08-01 DIAGNOSIS — M75.41 IMPINGEMENT SYNDROME OF RIGHT SHOULDER: ICD-10-CM

## 2018-08-01 DIAGNOSIS — G89.29 CHRONIC RIGHT SHOULDER PAIN: Primary | ICD-10-CM

## 2018-08-01 PROCEDURE — 97110 THERAPEUTIC EXERCISES: CPT

## 2018-08-01 PROCEDURE — G8984 CARRY CURRENT STATUS: HCPCS

## 2018-08-01 PROCEDURE — 97140 MANUAL THERAPY 1/> REGIONS: CPT

## 2018-08-01 PROCEDURE — 97112 NEUROMUSCULAR REEDUCATION: CPT

## 2018-08-01 PROCEDURE — G8985 CARRY GOAL STATUS: HCPCS

## 2018-08-03 ENCOUNTER — OFFICE VISIT (OUTPATIENT)
Dept: PHYSICAL THERAPY | Facility: CLINIC | Age: 53
End: 2018-08-03
Payer: COMMERCIAL

## 2018-08-03 DIAGNOSIS — M25.511 RIGHT SHOULDER PAIN, UNSPECIFIED CHRONICITY: ICD-10-CM

## 2018-08-03 DIAGNOSIS — M75.41 IMPINGEMENT SYNDROME OF RIGHT SHOULDER: ICD-10-CM

## 2018-08-03 DIAGNOSIS — M25.511 CHRONIC RIGHT SHOULDER PAIN: Primary | ICD-10-CM

## 2018-08-03 DIAGNOSIS — G89.29 CHRONIC RIGHT SHOULDER PAIN: Primary | ICD-10-CM

## 2018-08-03 PROCEDURE — 97112 NEUROMUSCULAR REEDUCATION: CPT

## 2018-08-03 PROCEDURE — 97110 THERAPEUTIC EXERCISES: CPT

## 2018-08-03 PROCEDURE — 97140 MANUAL THERAPY 1/> REGIONS: CPT

## 2018-08-03 NOTE — PROGRESS NOTES
Daily Note     Today's date: 8/3/2018  Patient name: Efrem Chatman  : 1965  MRN: [id number]  Referring provider: Scott Mejia MD  Dx:   Encounter Diagnosis     ICD-10-CM    1  Chronic right shoulder pain M25 511     G89 29    2  Impingement syndrome of right shoulder M75 41    3  Right shoulder pain, unspecified chronicity M25 511                   Subjective: Patient repots he has pain/difficulty when lifting a galon of milk to the top shelf of the fridge, or picking up sugar         Objective: See treatment diary below  Manual  6/29  7/6 7/18  7/25  7/27 8/1 8/3  6/20  6/22  6/26   Shoulder PROM 5min 8 min 8 min TK 5 min  5 min  5 min  TK  8 min   TK    modified median nerve glides   3 min 3 min 3 min  TK  3 min  3 min   3 min      TK    Cervical distraction retraction extension                       Cervical grade II and II cerivcal mobs                      ER MWM    TK          Manual Tractoin         TK  5 min                   Exercise Diary  6/29  7/6  7/18  7/25 7/27  8/1  8/3  6/20  6/22  6/26   Wall Walks 10x  NP  NP  NP  10x  10x  10x  10x  10x  10x    Pulley's   5 min 5 min  5 min  5 min  5 min  5 min    5 min    5 min  5 min  5 min    side lying ER to neutral   1# 2x10 2 x 10 3#  10x2 3#  10x2 3#  10x2  10x2 3#  10x2 3#  10x2  1# 2x10   1# 2x10    FRO   2 x 10 10x 2  NP  10x2    2x10  2x10    2x10    Supine cane AAROM flexion  20x 2  X 10 1# 20x 1#  20x   30x 1#  20x 2#   20x 2#  20x  20x  20x    Side lying abd to 90 degrees  1# 2x10 2 x 10 3# 2x10 3#  2x10 3#   2x10 3#  2x10 3#  2x10 3#  2x10  1# 2x10  1# 2x10    Cervical Retractoin  2x10 2 x 10  10x 2  2x10   2x10  2x10   2x10  2x10  2x10   2x10    Cervical Retraction with OP  2x10 2 x 10  10x 2  2x10  2x10  2x10  2x10  2x10  2x10  2x10   Cervical Retraction Extension  2x10  2x10  2x10  2 x 10  2x10  2x10   2x10  2x10  2x10   2x10    Cerivcal Retraction Extension with rotation  2x10  2x10  2x10  2 x 10  2x10  2x10 2 x 10  2x10  2x10   2x10    Theraband ER/IR  YTB 2x10  RTB 2x10  RTB 2x10  RTB 2x10  RTB 2x10  GTB 2x10  GTB 2x10 YTB 2 x 10 YTB 2x10  YTB 2x10    Theraband Row  [id number]  RTB 2x10  RTB 2x10  RTB 2x10  RTB 2x10  GTB 2x10  GTB 2x10 YTB 2 x 10  YTB 2x10  YTB 2x10    No monies  YTB 2x10  RTB 2x10  RTB 2x10  RTB 2x10  RTB 2x10  GTB 2x10  GTB 2x10     YTB 2x10    T-band extensions      RTB 2x10  RTB 2x10  RTB 2x10  GTB 2x10  GTB 2x10          Bicep curls       5# 2 x 10  5# 2x10  5# 2x10  5# 2x10          FTW       2 x 10  2x10 foam roll  2x10 foam roll  2x10 foam roll          [employer]              2# 2x10          Cone to shelf               2# 2x10                                                               Modalities  5/8  5/15  5/25  5/30  6/8             Cervical traction IM 8' 16-18# max 5# min  IM 10' 20# max- 5# min  IM 10' 20# max  5#  IM 10' max- 20# max min 5# M 10' max- 20# max min 5#                                                                   Assessment: Patient demonstrates tightness IR, with muscle guarding noted  Patient demonstrates good tolerance to added TE this session, no complaints of pain however fatigue noted  Patient offers no complaints post session  Patient would benefit from continued therapy to increase R shoulder strength and reduce pain  Plan: Continue per plan of care

## 2018-08-06 ENCOUNTER — HOSPITAL ENCOUNTER (OUTPATIENT)
Dept: MRI IMAGING | Facility: HOSPITAL | Age: 53
Discharge: HOME | End: 2018-08-06
Attending: ORTHOPAEDIC SURGERY
Payer: COMMERCIAL

## 2018-08-06 ENCOUNTER — HOSPITAL ENCOUNTER (OUTPATIENT)
Dept: RADIOLOGY | Facility: HOSPITAL | Age: 53
Discharge: HOME | End: 2018-08-06
Attending: ORTHOPAEDIC SURGERY | Admitting: RADIOLOGY
Payer: COMMERCIAL

## 2018-08-06 DIAGNOSIS — S43.431D SUPERIOR GLENOID LABRUM LESION OF RIGHT SHOULDER, SUBSEQUENT ENCOUNTER: ICD-10-CM

## 2018-08-06 PROCEDURE — 73040 CONTRAST X-RAY OF SHOULDER: CPT

## 2018-08-06 PROCEDURE — 73222 MRI JOINT UPR EXTREM W/DYE: CPT

## 2018-08-06 PROCEDURE — A9585 GADOBUTROL INJECTION: HCPCS | Performed by: ORTHOPAEDIC SURGERY

## 2018-08-06 PROCEDURE — 23350 INJECTION FOR SHOULDER X-RAY: CPT

## 2018-08-06 RX ORDER — 0.9 % SODIUM CHLORIDE 0.9 %
5 VIAL (ML) INJECTION
Status: COMPLETED | OUTPATIENT
Start: 2018-08-06 | End: 2018-08-06

## 2018-08-06 RX ORDER — LIDOCAINE HYDROCHLORIDE 10 MG/ML
5 INJECTION, SOLUTION EPIDURAL; INFILTRATION; INTRACAUDAL; PERINEURAL ONCE
Status: CANCELLED | OUTPATIENT
Start: 2018-08-06 | End: 2018-08-06

## 2018-08-06 RX ADMIN — IOHEXOL 7 ML: 300 INJECTION, SOLUTION INTRAVENOUS at 10:55

## 2018-08-06 RX ADMIN — LIDOCAINE HYDROCHLORIDE 7 ML: 10 INJECTION, SOLUTION INFILTRATION; PERINEURAL at 10:55

## 2018-08-06 RX ADMIN — GADOBUTROL 2 ML: 604.72 INJECTION INTRAVENOUS at 11:41

## 2018-08-06 RX ADMIN — GADOBUTROL 2 ML: 604.72 INJECTION INTRAVENOUS at 11:39

## 2018-08-06 RX ADMIN — SODIUM CHLORIDE 5 ML: 9 INJECTION, SOLUTION INTRAMUSCULAR; INTRAVENOUS; SUBCUTANEOUS at 10:55

## 2018-08-07 RX ORDER — LIDOCAINE HYDROCHLORIDE 10 MG/ML
15 INJECTION, SOLUTION INFILTRATION; PERINEURAL
Status: COMPLETED | OUTPATIENT
Start: 2018-08-07 | End: 2018-08-06

## 2018-08-08 ENCOUNTER — OFFICE VISIT (OUTPATIENT)
Dept: PHYSICAL THERAPY | Facility: CLINIC | Age: 53
End: 2018-08-08
Payer: COMMERCIAL

## 2018-08-08 DIAGNOSIS — M25.511 RIGHT SHOULDER PAIN, UNSPECIFIED CHRONICITY: ICD-10-CM

## 2018-08-08 DIAGNOSIS — G89.29 CHRONIC RIGHT SHOULDER PAIN: Primary | ICD-10-CM

## 2018-08-08 DIAGNOSIS — M75.41 IMPINGEMENT SYNDROME OF RIGHT SHOULDER: ICD-10-CM

## 2018-08-08 DIAGNOSIS — M25.511 CHRONIC RIGHT SHOULDER PAIN: Primary | ICD-10-CM

## 2018-08-08 PROCEDURE — 97112 NEUROMUSCULAR REEDUCATION: CPT

## 2018-08-08 PROCEDURE — 97110 THERAPEUTIC EXERCISES: CPT

## 2018-08-08 PROCEDURE — 97140 MANUAL THERAPY 1/> REGIONS: CPT

## 2018-08-08 NOTE — PROGRESS NOTES
Daily Note     Today's date: 2018  Patient name: Daren Salas  : 1965  MRN: [id number]  Referring provider: Radha Oconnell MD  Dx:   Encounter Diagnosis     ICD-10-CM    1  Chronic right shoulder pain M25 511     G89 29    2  Impingement syndrome of right shoulder M75 41    3  Right shoulder pain, unspecified chronicity M25 511                   Subjective:Patient reports that he had an MRI on Monday and that he will see dr Laine Zaman tomorrow for results  Patient also reports having an injection into the R shoulder Monday and he is experiencing some soreness       Objective: See treatment diary below  Manual  6/29  7/6 7/18  7/25  7/27 8/1 8/3  8/8  6/22  6/26   Shoulder PROM 5min 8 min 8 min TK 5 min  5 min  5 min 6 min  8 min   TK    modified median nerve glides   3 min 3 min 3 min  TK  3 min  3 min   3 min  3 min    TK    Cervical distraction retraction extension                       Cervical grade II and II cerivcal mobs                      ER MWM    TK          Manual Tractoin         TK  5 min                   Exercise Diary  6/29  7/6  7/18  7/25 7/27  8/1  8/3  8/8  6/22  6/26   Wall Walks 10x  NP  NP  NP  10x  10x  10x  10x  10x  10x    Pulley's   5 min 5 min  5 min  5 min  5 min  5 min    5 min    5 min  5 min  5 min    side lying ER to neutral   1# 2x10 2 x 10 3#  10x2 3#  10x2 3#  10x2  10x2 3#  10x2 3#  10x2 3#  1# 2x10   1# 2x10    FRO   2 x 10 10x 2  NP  10x2    2x10  2x10  2x10  2x10    Supine cane AAROM flexion  20x 2  X 10 1# 20x 1#  20x   30x 1#  20x 2#   20x 2#  20x  20x  20x    Side lying abd to 90 degrees  1# 2x10 2 x 10 3# 2x10 3#  2x10 3#   2x10 3#  2x10 3#  2x10 3#  2x10  1# 2x10  1# 2x10    Cervical Retractoin  2x10 2 x 10  10x 2  2x10   2x10  2x10   2x10  2x10  2x10   2x10    Cervical Retraction with OP  2x10 2 x 10  10x 2  2x10  2x10  2x10  2x10  2x10  2x10  2x10   Cervical Retraction Extension  2x10  2x10  2x10  2 x 10  2x10  2x10   2x10  2x10  2x10   2x10    Cerivcal Retraction Extension with rotation  2x10  2x10  2x10  2 x 10  2x10  2x10 2 x 10  2x10  2x10   2x10    Theraband ER/IR  YTB 2x10  RTB 2x10  RTB 2x10  RTB 2x10  RTB 2x10  GTB 2x10  GTB 2x10 RTB 2 x 10 RTB 2x10  YTB 2x10    Theraband Row  [id number]  RTB 2x10  RTB 2x10  RTB 2x10  RTB 2x10  GTB 2x10  GTB 2x10 RTB 2 x 10  RTB 2x10  YTB 2x10    No monies  YTB 2x10  RTB 2x10  RTB 2x10  RTB 2x10  RTB 2x10  GTB 2x10  GTB 2x10  RTB 10x 2  RTB 10x2 YTB 2x10    T-band extensions      RTB 2x10  RTB 2x10  RTB 2x10  GTB 2x10  GTB 2x10  RTB 10 x 2  RTB 10x2      Bicep curls       5# 2 x 10  5# 2x10  5# 2x10  5# 2x10    5# 2x10      FTW       2 x 10  2x10 foam roll  2x10 foam roll  2x10 foam roll    2x10 foam roll      Hitch hikers              2# 2x10    2# 2x10      Cone to shelf               2# 2x10    2# 2x10 (onto step)                                                           Modalities  5/8  5/15  5/25  5/30  6/8             Cervical traction IM 8' 16-18# max 5# min  IM 10' 20# max- 5# min  IM 10' 20# max  5#  IM 10' max- 20# max min 5# M 10' max- 20# max min 5#                                                                   Assessment: Muscle guarding present with PROM, decreased with verbal cuing to relax  Continues to demonstrate good tolerance to newly added lifting TE  Patient offers no increase in sx post session  Patient would benefit from continued therapy to increase R shoulder strength and reduce pain  Plan: Continue per plan of care

## 2018-08-09 ENCOUNTER — OFFICE VISIT (OUTPATIENT)
Dept: OBGYN CLINIC | Facility: OTHER | Age: 53
End: 2018-08-09
Payer: COMMERCIAL

## 2018-08-09 VITALS
SYSTOLIC BLOOD PRESSURE: 130 MMHG | WEIGHT: 190.6 LBS | BODY MASS INDEX: 29.85 KG/M2 | HEART RATE: 54 BPM | DIASTOLIC BLOOD PRESSURE: 87 MMHG

## 2018-08-09 DIAGNOSIS — S43.431D SUPERIOR GLENOID LABRUM LESION OF RIGHT SHOULDER, SUBSEQUENT ENCOUNTER: Primary | ICD-10-CM

## 2018-08-09 PROBLEM — M67.911 ROTATOR CUFF DISORDER, RIGHT: Status: RESOLVED | Noted: 2018-03-16 | Resolved: 2018-08-09

## 2018-08-09 PROCEDURE — 99214 OFFICE O/P EST MOD 30 MIN: CPT | Performed by: ORTHOPAEDIC SURGERY

## 2018-08-09 NOTE — PATIENT INSTRUCTIONS
You are being scheduled for a shoulder arthroscopy to treat your symptoms  Below are some instructions and information on what to expect before and after your surgery  Pre-Surgical Preparation for Arthroscopic Shoulder Surgery: You will be contacted the evening prior to your surgery to confirm the scheduled time of the procedure and when to arrive at the hospital    Do not eat or drink anything after midnight the night before your surgery  Since you are having out-patient surgery, make sure that you have someone who can drive you home later in the day  Also, prepare that person for a long day, as the process of safely preparing for and recovering from the procedure is more time consuming than the actual procedure! As you will be in a sling after surgery, please wear or bring a loose fitting button-down shirt so that you can easily place this over the sling when you leave the surgical suite  This avoids having to place the operative arm in a sleeve  Most patients find that this is the easiest outfit to wear for the first week or so after surgery so you may want to plan accordingly  Most patients find that lying down in bed after shoulder surgery accentuates their discomfort  This is likely related to the effect of gravity on the swelling in the shoulder  As a result, most patients sleep better in a recliner or in bed with pillows propped up behind their back for the first few days or weeks after surgery  It is a good idea to plan for this ahead of time so there will be less hassle getting things set up the night after surgery  What to Expect After Arthroscopic Shoulder Surgery: It is normal to have swelling and discomfort in the shoulder for several days or a week after surgery  It is also normal to have a small amount of drainage from the surgical wounds (especially the first few days after surgery), as we put fluid into the shoulder to visualize the structures during surgery  It is NOT normal to have foul smelling, purulent drainage and if this is noted, please contact the office immediately or proceed to the emergency room for evaluation as this may indicate an infection  Applying ice bags to the shoulder may help with pain that is not controlled by the pain medicine  Ice should be applied 20-30 minutes at a time, every hour or two  Make sure to put a thin towel or T-shirt next to your skin to avoid direct contact of the ice with the skin  Icing is most helpful in the first 48 hours, although many people find that continuing past this time frame lessens their postoperative pain  Please note that your post-operative dressing is not conductive to ice, so if you need to, it is okay to remove that dressing even the night after surgery and place band-aids over the wounds in order for the ice to take effect  Pain Control    Most patients will receive a nerve block, the local anesthetic may keep your whole arm numb for several hours (12-24 in most cases)  When the block is beginning to wear off, you will first feel a pins and needles sensation in your hand  This is a warning that you may start experiencing more pain, so please take a pain pill if you have not already  You will be given a prescription for pain medication when you are discharged from the hospital  If you find you do not tolerate that type of pain medicine well, call our office and we will try another one  The anesthesiologists have also been providing most patients with a catheter that is left in place after the block  The catheter is connected to a small pump which will continue to provide numbing medicine and help prolong the pain control from the block  Unfortunately this catheter is not as effective as the initial block, but can still be very helpful in managing the pain  Even with the block the pain can be significant and a narcotic pain medication is often required to manage the pain    A prescription for this will be provided but only a limited number of pills will be prescribed to help manage the acute phase of recovery  Outside of the acute phase (5 or so days), this medication will not be indicated  In addition to the narcotic pain medication, it is safe to use an anti-inflammatory (unless the patient has a medical condition that would not allow safe use of this mediation)  This includes the Advil, Motrin, Ibuprofen and Alleve category of medications  Simply follow the over the counter dosing on the package and use as indicated as another adjunct  Importantly since these medications are all very similar, use only one of them  Tylenol is a separate medication that can be utilized as well and can be taken at the same time as the other medication or given in a "staggered" manner  Just make sure that you follow the dosing on the over the counter bottle instructions  Also make sure that the pain medication prescribed by Dr Amna Calix team does not contain acetaminophen (this is found in Percocet and Vicodin)  Typically we do not prescribe those types of pain medications but if for some reason that has been prescribed DO NOT add more Tylenol (acetaminophen) as you could end up taking too much of that medication  As mentioned above, most patients find that lying down accentuates their discomfort  You might sleep better in a recliner, or propped up in bed  Dr Kristie Penaloza encourages patients to safely ambulate around the house as much as possible in the first few days after the procedure as this can help with blood circulation in the legs  While the incidence of blood clots is very rare following shoulder surgery, early ambulation is a great way to help decrease the already low rate  24-48 hours after the surgery you may remove the bandage and cover incisions with Band-Aids if needed   At that time you may shower, the wounds will have a surgical glue that will protect them from shower water but do not submerge your incisions directly (bathing or swimming) until at least 2 weeks post-operatively  Unless noted otherwise in your discharge paperwork, Dr Paul Conteh uses absorbable sutures so they do not need to be removed  Dr Gabi Leal physician assistant (PA) will see you in the office a few days after the procedure to review the intra-operative findings and to initiate physical therapy if appropriate  A post-operative appointment should have been scheduled for you already, but if for some reason this did not happen, please call the office to make one  Physical therapy is important after nearly all shoulder surgeries and a detailed rehabilitation plan based on the specific intra-operative findings and procedures will be provided to your therapist at the first post-operative office visit  Most patients have post-operative therapy appointments scheduled pre-operatively, but if you do not, that will be handled at the first post-operative office visit  Unless expressly directed otherwise it is safe to remove the sling even the first day after the surgery and let the arm hang by the side  This allows patients to shower and even put a shirt on (bad arm in the sleeve first)  It is also safe to flex and extend their wrist, hand and fingers as much as possible when the block wears off  These simple motions can serve to pump fluid out of the forearm and decrease swelling in the arm

## 2018-08-09 NOTE — PROGRESS NOTES
Assessment  Diagnoses and all orders for this visit:    Superior glenoid labrum lesion of right shoulder, subsequent encounter        Discussion and Plan:    1  Plan for OR with Dr Akhil Mcclellan for SLAP repair vs biceps tenodesis  2  Therapy and sling will be arranged  3  Follow up as outpatient    Subjective:   Patient ID: Katalina Jean is a 46 y o  male      Eric Miranda returns to the office with a chief complaint of right shoulder pain  He had a subacromial decompression by Dr Tam Trevino and continues to have right shoulder pain  He reports pain is the same as it was prior to his shoulder arthroscopy  Pain interferes with activities of daily living and activities of enjoyment  Pain is improved with rest   He denies numbness, tingling, fever or chills  Eric Miranda presents today with a MRI arthrogram             The following portions of the patient's history were reviewed and updated as appropriate: allergies, current medications, past family history, past medical history, past social history, past surgical history and problem list     Review of Systems   Constitutional: Negative for chills and fever  HENT: Negative for hearing loss  Eyes: Negative for visual disturbance  Respiratory: Negative for shortness of breath  Cardiovascular: Negative for chest pain  Gastrointestinal: Negative for abdominal pain  Musculoskeletal:        As reviewed in the HPI   Skin: Negative for rash  Neurological:        As reviewed in the HPI   Psychiatric/Behavioral: Negative for agitation  Objective:  Right Shoulder Exam     Tenderness   The patient is experiencing tenderness in the trapezius      Range of Motion   Passive Abduction:                    90  Forward Flexion:                        180  External Rotation:                      50    Muscle Strength   External Rotation: 4/5  Supraspinatus:     5/5    Tests   Impingement:   Positive  Breaux:          Positive  Drop Arm:        Negative    Comments:  Positive speeds  Positive o'briens          Physical Exam   Constitutional: He is oriented to person, place, and time  He appears well-developed and well-nourished  HENT:   Head: Normocephalic and atraumatic  Eyes: EOM are normal    Neck: Normal range of motion  Neck supple  Cardiovascular: Normal rate, regular rhythm and normal heart sounds  Pulmonary/Chest: Effort normal and breath sounds normal  No respiratory distress  He has no wheezes  Abdominal: Soft  He exhibits no distension  Neurological: He is alert and oriented to person, place, and time  Skin: Skin is warm and dry  Psychiatric: He has a normal mood and affect  His behavior is normal  Judgment and thought content normal    Nursing note and vitals reviewed  I have personally reviewed pertinent films in PACS and my interpretation is as follows  MR arthrogram right shoulder - SLAP tear    I Lisa Das MD personally examined the patient and reviewed the history provided  I agree with the note and the assessment and plan by Abby Lopez PA-C  Briefly the patient is a 46 y o  male who presents to the office for evaluation and treatment  Please refer to the HPI documented by the PA in the body of the note for further details  Physical Exam: Blood pressure 130/87, pulse (!) 54, weight 86 5 kg (190 lb 9 6 oz)  Right shoulder full range of motion with positive [place] sign and pain with abduction testing    Radiology: I have personally reviewed the following images and my interpretation follows  MRI right shoulder shows a slap tear    Assessment:    Right shoulder symptomatic SLAP tear    Plan:    The patient has continued symptoms despite arthroscopic debridement and subacromial decompression and as such I feel he is indicated at this time for arthroscopic slap repair versus biceps tenodesis  He does understand that decision will be made intraoperatively     A thorough discussion was had regarding the risks and benefits of the procedure (arthroscopic SLAP repair)  Risks discussed include but are not limited to infection, neurovascular injury, risk of anesthesia, recurrent tear of the labrum, need for further surgery, need for long head biceps tenodesis, stiffness requiring prolonged rehabilitation or return to the operating room for manipulation  After this discussion, all questions were answered and informed consent was obtained in the office for arthroscopic SLAP repair

## 2018-08-10 ENCOUNTER — ANESTHESIA EVENT (OUTPATIENT)
Dept: PERIOP | Facility: AMBULARY SURGERY CENTER | Age: 53
End: 2018-08-10
Payer: COMMERCIAL

## 2018-08-10 ENCOUNTER — APPOINTMENT (OUTPATIENT)
Dept: PHYSICAL THERAPY | Facility: CLINIC | Age: 53
End: 2018-08-10
Payer: COMMERCIAL

## 2018-08-10 NOTE — PRE-PROCEDURE INSTRUCTIONS
Pre-Surgery Instructions:   Medication Instructions    amLODIPine (NORVASC) 10 mg tablet Instructed patient per Anesthesia Guidelines   Ascorbic Acid (VITAMIN C) 500 MG CAPS Instructed patient per Anesthesia Guidelines   aspirin 325 mg tablet Patient was instructed by Physician and understands   lisinopril (ZESTRIL) 20 mg tablet Instructed patient per Anesthesia Guidelines   meloxicam (MOBIC) 15 mg tablet Instructed patient per Anesthesia Guidelines   metoprolol succinate (TOPROL-XL) 50 mg 24 hr tablet Instructed patient per Anesthesia Guidelines   Multiple Vitamins-Minerals (MULTIVITAMIN ADULT PO) Instructed patient per Anesthesia Guidelines      Pre op and showering instructions reviewed-Patient has hibiclens-Instructed to bring immobilizer the DOS

## 2018-08-11 ENCOUNTER — APPOINTMENT (OUTPATIENT)
Dept: LAB | Facility: HOSPITAL | Age: 53
End: 2018-08-11
Payer: COMMERCIAL

## 2018-08-11 ENCOUNTER — TRANSCRIBE ORDERS (OUTPATIENT)
Dept: ADMINISTRATIVE | Facility: HOSPITAL | Age: 53
End: 2018-08-11

## 2018-08-11 DIAGNOSIS — Z00.8 ENCOUNTER FOR OTHER GENERAL EXAMINATION: ICD-10-CM

## 2018-08-11 DIAGNOSIS — Z00.8 ENCOUNTER FOR OTHER GENERAL EXAMINATION: Primary | ICD-10-CM

## 2018-08-11 LAB
CHOLEST SERPL-MCNC: 199 MG/DL (ref 50–200)
EST. AVERAGE GLUCOSE BLD GHB EST-MCNC: 105 MG/DL
HBA1C MFR BLD: 5.3 % (ref 4.2–6.3)
HDLC SERPL-MCNC: 38 MG/DL (ref 40–60)
LDLC SERPL CALC-MCNC: 138 MG/DL (ref 0–100)
NONHDLC SERPL-MCNC: 161 MG/DL
TRIGL SERPL-MCNC: 115 MG/DL

## 2018-08-11 PROCEDURE — 36415 COLL VENOUS BLD VENIPUNCTURE: CPT

## 2018-08-11 PROCEDURE — 80061 LIPID PANEL: CPT

## 2018-08-11 PROCEDURE — 83036 HEMOGLOBIN GLYCOSYLATED A1C: CPT

## 2018-08-14 ENCOUNTER — OFFICE VISIT (OUTPATIENT)
Dept: FAMILY MEDICINE CLINIC | Facility: CLINIC | Age: 53
End: 2018-08-14
Payer: COMMERCIAL

## 2018-08-14 VITALS
SYSTOLIC BLOOD PRESSURE: 116 MMHG | HEIGHT: 67 IN | DIASTOLIC BLOOD PRESSURE: 82 MMHG | WEIGHT: 194 LBS | RESPIRATION RATE: 16 BRPM | BODY MASS INDEX: 30.45 KG/M2 | HEART RATE: 54 BPM | TEMPERATURE: 98 F | OXYGEN SATURATION: 97 %

## 2018-08-14 DIAGNOSIS — Z01.818 PRE-OP EXAM: Primary | ICD-10-CM

## 2018-08-14 DIAGNOSIS — S43.431D SUPERIOR GLENOID LABRUM LESION OF RIGHT SHOULDER, SUBSEQUENT ENCOUNTER: ICD-10-CM

## 2018-08-14 DIAGNOSIS — I10 HYPERTENSION, UNSPECIFIED TYPE: ICD-10-CM

## 2018-08-14 DIAGNOSIS — R00.1 BRADYCARDIA: ICD-10-CM

## 2018-08-14 PROCEDURE — 3079F DIAST BP 80-89 MM HG: CPT | Performed by: INTERNAL MEDICINE

## 2018-08-14 PROCEDURE — 3074F SYST BP LT 130 MM HG: CPT | Performed by: INTERNAL MEDICINE

## 2018-08-14 PROCEDURE — 99214 OFFICE O/P EST MOD 30 MIN: CPT | Performed by: INTERNAL MEDICINE

## 2018-08-14 PROCEDURE — 93000 ELECTROCARDIOGRAM COMPLETE: CPT | Performed by: INTERNAL MEDICINE

## 2018-08-14 RX ORDER — METOPROLOL SUCCINATE 50 MG/1
25 TABLET, EXTENDED RELEASE ORAL DAILY
Qty: 30 TABLET | Refills: 0 | Status: SHIPPED | OUTPATIENT
Start: 2018-08-14 | End: 2018-12-21 | Stop reason: SDUPTHER
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
## 2018-08-14 NOTE — PATIENT INSTRUCTIONS
Will reduce metoprolol to 25mg/day  Told pt no aspirin, nsaids, ginkoba, gingsing, vit e or fish oil 1 week before or  Okay to take amlodipine, lisinopril and only 25mg  Metoprolol day of surgery  Cleared for surgery  He will repeat bp/hr before or    Reviewed ekg - stable

## 2018-08-15 ENCOUNTER — APPOINTMENT (OUTPATIENT)
Dept: PHYSICAL THERAPY | Facility: CLINIC | Age: 53
End: 2018-08-15
Payer: COMMERCIAL

## 2018-08-17 ENCOUNTER — APPOINTMENT (OUTPATIENT)
Dept: PHYSICAL THERAPY | Facility: CLINIC | Age: 53
End: 2018-08-17
Payer: COMMERCIAL

## 2018-08-22 ENCOUNTER — ANESTHESIA (OUTPATIENT)
Dept: PERIOP | Facility: AMBULARY SURGERY CENTER | Age: 53
End: 2018-08-22
Payer: COMMERCIAL

## 2018-08-22 ENCOUNTER — HOSPITAL ENCOUNTER (OUTPATIENT)
Facility: AMBULARY SURGERY CENTER | Age: 53
Setting detail: OUTPATIENT SURGERY
Discharge: HOME | End: 2018-08-22
Attending: ORTHOPAEDIC SURGERY | Admitting: ORTHOPAEDIC SURGERY
Payer: COMMERCIAL

## 2018-08-22 ENCOUNTER — APPOINTMENT (OUTPATIENT)
Dept: PHYSICAL THERAPY | Facility: CLINIC | Age: 53
End: 2018-08-22
Payer: COMMERCIAL

## 2018-08-22 VITALS
SYSTOLIC BLOOD PRESSURE: 136 MMHG | DIASTOLIC BLOOD PRESSURE: 80 MMHG | RESPIRATION RATE: 18 BRPM | TEMPERATURE: 97.8 F | BODY MASS INDEX: 29.66 KG/M2 | HEIGHT: 67 IN | OXYGEN SATURATION: 96 % | WEIGHT: 189 LBS | HEART RATE: 62 BPM

## 2018-08-22 DIAGNOSIS — S43.431D SUPERIOR GLENOID LABRUM LESION OF RIGHT SHOULDER, SUBSEQUENT ENCOUNTER: Primary | ICD-10-CM

## 2018-08-22 PROCEDURE — C1713 ANCHOR/SCREW BN/BN,TIS/BN: HCPCS | Performed by: ORTHOPAEDIC SURGERY

## 2018-08-22 DEVICE — ANCHOR GRYPHON W ORTHOCORD 210813: Type: IMPLANTABLE DEVICE | Site: SHOULDER | Status: FUNCTIONAL

## 2018-08-22 RX ORDER — OXYCODONE HYDROCHLORIDE 5 MG/1
TABLET ORAL
Qty: 30 TABLET | Refills: 0 | Status: SHIPPED | OUTPATIENT
Start: 2018-08-22 | End: 2018-10-29

## 2018-08-22 RX ORDER — ACETAMINOPHEN 325 MG/1
650 TABLET ORAL EVERY 6 HOURS PRN
Status: DISCONTINUED | OUTPATIENT
Start: 2018-08-22 | End: 2018-08-22 | Stop reason: HOSPADM

## 2018-08-22 RX ORDER — LIDOCAINE HYDROCHLORIDE 10 MG/ML
5 INJECTION, SOLUTION EPIDURAL; INFILTRATION; INTRACAUDAL; PERINEURAL ONCE
Status: DISCONTINUED | OUTPATIENT
Start: 2018-08-22 | End: 2018-08-22 | Stop reason: HOSPADM

## 2018-08-22 RX ORDER — ONDANSETRON 2 MG/ML
INJECTION INTRAMUSCULAR; INTRAVENOUS AS NEEDED
Status: DISCONTINUED | OUTPATIENT
Start: 2018-08-22 | End: 2018-08-22 | Stop reason: SURG

## 2018-08-22 RX ORDER — ROPIVACAINE HYDROCHLORIDE 5 MG/ML
INJECTION, SOLUTION EPIDURAL; INFILTRATION; PERINEURAL AS NEEDED
Status: DISCONTINUED | OUTPATIENT
Start: 2018-08-22 | End: 2018-08-22 | Stop reason: SURG

## 2018-08-22 RX ORDER — FENTANYL CITRATE 50 UG/ML
INJECTION, SOLUTION INTRAMUSCULAR; INTRAVENOUS AS NEEDED
Status: DISCONTINUED | OUTPATIENT
Start: 2018-08-22 | End: 2018-08-22 | Stop reason: SURG

## 2018-08-22 RX ORDER — LIDOCAINE HYDROCHLORIDE 10 MG/ML
INJECTION, SOLUTION INFILTRATION; PERINEURAL AS NEEDED
Status: DISCONTINUED | OUTPATIENT
Start: 2018-08-22 | End: 2018-08-22 | Stop reason: SURG

## 2018-08-22 RX ORDER — ONDANSETRON 2 MG/ML
4 INJECTION INTRAMUSCULAR; INTRAVENOUS ONCE AS NEEDED
Status: DISCONTINUED | OUTPATIENT
Start: 2018-08-22 | End: 2018-08-22 | Stop reason: HOSPADM

## 2018-08-22 RX ORDER — FENTANYL CITRATE/PF 50 MCG/ML
25 SYRINGE (ML) INJECTION
Status: DISCONTINUED | OUTPATIENT
Start: 2018-08-22 | End: 2018-08-22 | Stop reason: HOSPADM

## 2018-08-22 RX ORDER — ONDANSETRON 2 MG/ML
4 INJECTION INTRAMUSCULAR; INTRAVENOUS EVERY 6 HOURS PRN
Status: DISCONTINUED | OUTPATIENT
Start: 2018-08-22 | End: 2018-08-22 | Stop reason: HOSPADM

## 2018-08-22 RX ORDER — PROPOFOL 10 MG/ML
INJECTION, EMULSION INTRAVENOUS AS NEEDED
Status: DISCONTINUED | OUTPATIENT
Start: 2018-08-22 | End: 2018-08-22 | Stop reason: SURG

## 2018-08-22 RX ORDER — OXYCODONE HYDROCHLORIDE 5 MG/1
5 TABLET ORAL EVERY 4 HOURS PRN
Status: DISCONTINUED | OUTPATIENT
Start: 2018-08-22 | End: 2018-08-22 | Stop reason: HOSPADM

## 2018-08-22 RX ORDER — EPHEDRINE SULFATE 50 MG/ML
INJECTION, SOLUTION INTRAVENOUS AS NEEDED
Status: DISCONTINUED | OUTPATIENT
Start: 2018-08-22 | End: 2018-08-22 | Stop reason: SURG

## 2018-08-22 RX ORDER — MIDAZOLAM HYDROCHLORIDE 1 MG/ML
INJECTION INTRAMUSCULAR; INTRAVENOUS AS NEEDED
Status: DISCONTINUED | OUTPATIENT
Start: 2018-08-22 | End: 2018-08-22 | Stop reason: SURG

## 2018-08-22 RX ORDER — SODIUM CHLORIDE 9 MG/ML
100 INJECTION, SOLUTION INTRAVENOUS CONTINUOUS
Status: DISCONTINUED | OUTPATIENT
Start: 2018-08-22 | End: 2018-08-22 | Stop reason: HOSPADM

## 2018-08-22 RX ADMIN — ROPIVACAINE HYDROCHLORIDE 15 ML: 5 INJECTION, SOLUTION EPIDURAL; INFILTRATION; PERINEURAL at 08:33

## 2018-08-22 RX ADMIN — SODIUM CHLORIDE: 0.9 INJECTION, SOLUTION INTRAVENOUS at 09:39

## 2018-08-22 RX ADMIN — FENTANYL CITRATE 50 MCG: 50 INJECTION, SOLUTION INTRAMUSCULAR; INTRAVENOUS at 09:18

## 2018-08-22 RX ADMIN — EPHEDRINE SULFATE 5 MG: 50 INJECTION, SOLUTION INTRAMUSCULAR; INTRAVENOUS; SUBCUTANEOUS at 09:37

## 2018-08-22 RX ADMIN — CEFAZOLIN SODIUM 2000 MG: 2 SOLUTION INTRAVENOUS at 09:10

## 2018-08-22 RX ADMIN — LIDOCAINE HYDROCHLORIDE 50 MG: 10 INJECTION, SOLUTION INFILTRATION; PERINEURAL at 09:06

## 2018-08-22 RX ADMIN — SODIUM CHLORIDE 100 ML/HR: 0.9 INJECTION, SOLUTION INTRAVENOUS at 07:17

## 2018-08-22 RX ADMIN — MIDAZOLAM HYDROCHLORIDE 2 MG: 1 INJECTION, SOLUTION INTRAMUSCULAR; INTRAVENOUS at 08:26

## 2018-08-22 RX ADMIN — Medication 8 ML/HR: at 10:25

## 2018-08-22 RX ADMIN — PROPOFOL 200 MG: 10 INJECTION, EMULSION INTRAVENOUS at 09:06

## 2018-08-22 RX ADMIN — ONDANSETRON 4 MG: 2 INJECTION INTRAMUSCULAR; INTRAVENOUS at 09:58

## 2018-08-22 RX ADMIN — FENTANYL CITRATE 50 MCG: 50 INJECTION, SOLUTION INTRAMUSCULAR; INTRAVENOUS at 08:26

## 2018-08-22 RX ADMIN — DEXAMETHASONE SODIUM PHOSPHATE 4 MG: 10 INJECTION INTRAMUSCULAR; INTRAVENOUS at 09:26

## 2018-08-22 NOTE — ANESTHESIA PROCEDURE NOTES
Peripheral Block    Patient location during procedure: holding area  Start time: 8/22/2018 8:30 AM  Reason for block: at surgeon's request and post-op pain management  Staffing  Anesthesiologist: Tito Greer  Performed: anesthesiologist   Preanesthetic Checklist  Completed: patient identified, site marked, surgical consent, pre-op evaluation, timeout performed, IV checked, risks and benefits discussed and monitors and equipment checked  Peripheral Block  Patient position: sitting  Prep: ChloraPrep and site prepped and draped  Patient monitoring: heart rate, continuous pulse ox, frequent blood pressure checks and cardiac monitor  Block type: interscalene  Laterality: right  Injection technique: catheter  Procedures: ultrasound guided  Ultrasound permanent image saved  Local infiltration: ropivacaine (with epi 1:400K)  Infiltration strength: 0 5 %  Dose: 15 mL  Needle  Needle type: Stimuplex   Needle gauge: 25 G    Needle localization: ultrasound guidance  Catheter type: open end  Catheter at skin depth: 4 5 cm  Assessment  Injection assessment: incremental injection, local visualized surrounding nerve on ultrasound, negative aspiration for heme and no paresthesia on injection  Paresthesia pain: none  Heart rate change: no  Slow fractionated injection: yes  Post-procedure:  sterile dressing applied  patient tolerated the procedure well with no immediate complications

## 2018-08-22 NOTE — DISCHARGE INSTRUCTIONS
You are being scheduled for a shoulder arthroscopy to treat your symptoms  Below are some instructions and information on what to expect before and after your surgery  Pre-Surgical Preparation for Arthroscopic Shoulder Surgery: You will be contacted the evening prior to your surgery to confirm the scheduled time of the procedure and when to arrive at the hospital    Do not eat or drink anything after midnight the night before your surgery  Since you are having out-patient surgery, make sure that you have someone who can drive you home later in the day  Also, prepare that person for a long day, as the process of safely preparing for and recovering from the procedure is more time consuming than the actual procedure! As you will be in a sling after surgery, please wear or bring a loose fitting button-down shirt so that you can easily place this over the sling when you leave the surgical suite  This avoids having to place the operative arm in a sleeve  Most patients find that this is the easiest outfit to wear for the first week or so after surgery so you may want to plan accordingly  Most patients find that lying down in bed after shoulder surgery accentuates their discomfort  This is likely related to the effect of gravity on the swelling in the shoulder  As a result, most patients sleep better in a recliner or in bed with pillows propped up behind their back for the first few days or weeks after surgery  It is a good idea to plan for this ahead of time so there will be less hassle getting things set up the night after surgery  What to Expect After Arthroscopic Shoulder Surgery: It is normal to have swelling and discomfort in the shoulder for several days or a week after surgery  It is also normal to have a small amount of drainage from the surgical wounds (especially the first few days after surgery), as we put fluid into the shoulder to visualize the structures during surgery  It is NOT normal to have foul smelling, purulent drainage and if this is noted, please contact the office immediately or proceed to the emergency room for evaluation as this may indicate an infection  Applying ice bags to the shoulder may help with pain that is not controlled by the pain medicine  Ice should be applied 20-30 minutes at a time, every hour or two  Make sure to put a thin towel or T-shirt next to your skin to avoid direct contact of the ice with the skin  Icing is most helpful in the first 48 hours, although many people find that continuing past this time frame lessens their postoperative pain  Please note that your post-operative dressing is not conductive to ice, so if you need to, it is okay to remove that dressing even the night after surgery and place band-aids over the wounds in order for the ice to take effect  Pain Control    Most patients will receive a nerve block, the local anesthetic may keep your whole arm numb for several hours (12-24 in most cases)  When the block is beginning to wear off, you will first feel a pins and needles sensation in your hand  This is a warning that you may start experiencing more pain, so please take a pain pill if you have not already  You will be given a prescription for pain medication when you are discharged from the hospital  If you find you do not tolerate that type of pain medicine well, call our office and we will try another one  The anesthesiologists have also been providing most patients with a catheter that is left in place after the block  The catheter is connected to a small pump which will continue to provide numbing medicine and help prolong the pain control from the block  Unfortunately this catheter is not as effective as the initial block, but can still be very helpful in managing the pain  Even with the block the pain can be significant and a narcotic pain medication is often required to manage the pain    A prescription for this will be provided but only a limited number of pills will be prescribed to help manage the acute phase of recovery  Outside of the acute phase (5 or so days), this medication will not be indicated  In addition to the narcotic pain medication, it is safe to use an anti-inflammatory (unless the patient has a medical condition that would not allow safe use of this mediation)  This includes the Advil, Motrin, Ibuprofen and Alleve category of medications  Simply follow the over the counter dosing on the package and use as indicated as another adjunct  Importantly since these medications are all very similar, use only one of them  Tylenol is a separate medication that can be utilized as well and can be taken at the same time as the other medication or given in a "staggered" manner  Just make sure that you follow the dosing on the over the counter bottle instructions  Also make sure that the pain medication prescribed by Dr Nicolasa Soni team does not contain acetaminophen (this is found in Percocet and Vicodin)  Typically we do not prescribe those types of pain medications but if for some reason that has been prescribed DO NOT add more Tylenol (acetaminophen) as you could end up taking too much of that medication  As mentioned above, most patients find that lying down accentuates their discomfort  You might sleep better in a recliner, or propped up in bed  Dr Dolores Carrion encourages patients to safely ambulate around the house as much as possible in the first few days after the procedure as this can help with blood circulation in the legs  While the incidence of blood clots is very rare following shoulder surgery, early ambulation is a great way to help decrease the already low rate  24-48 hours after the surgery you may remove the bandage and cover incisions with Band-Aids if needed   At that time you may shower, the wounds will have a surgical glue that will protect them from shower water but do not submerge your incisions directly (bathing or swimming) until at least 2 weeks post-operatively  Unless noted otherwise in your discharge paperwork, Dr Lamonte Palomo uses absorbable sutures so they do not need to be removed  Dr Peter Garcia physician assistant (PA) will see you in the office a few days after the procedure to review the intra-operative findings and to initiate physical therapy if appropriate  A post-operative appointment should have been scheduled for you already, but if for some reason this did not happen, please call the office to make one  Physical therapy is important after nearly all shoulder surgeries and a detailed rehabilitation plan based on the specific intra-operative findings and procedures will be provided to your therapist at the first post-operative office visit  Most patients have post-operative therapy appointments scheduled pre-operatively, but if you do not, that will be handled at the first post-operative office visit  Unless expressly directed otherwise it is safe to remove the sling even the first day after the surgery and let the arm hang by the side  This allows patients to shower and even put a shirt on (bad arm in the sleeve first)  It is also safe to flex and extend their wrist, hand and fingers as much as possible when the block wears off  These simple motions can serve to pump fluid out of the forearm and decrease swelling in the arm

## 2018-08-22 NOTE — OP NOTE
OPERATIVE REPORT  PATIENT NAME: Artemio Jimenez    :  1965  MRN: [id number]  Pt Location: [facility] OR [facility]OM 06    SURGERY DATE: 2018     SURGEON: Levar Maloney MD     ASSISTANT: Sergio Villafana PA-C     NOTE: Sergio Villafana PA-C was present throughout the entire procedure and performed essential assistance with patient prepping, draping, positioning, suture management, wound closure, sterile dressing application and sling application, all under my direct supervision  NOTE: No qualified resident physician was available for assistance    PREOPERATIVE DIAGNOSIS: Right Shoulder SLAP Tear    POSTOPERATIVE DIAGNOSIS: Right Shoulder SLAP Tear, Posterior Labral Tear and Extensive Glenohumeral Synovitis    PROCEDURES: Surgical Arthroscopy Right Shoulder with SLAP Repair, Posterior Labral Repair and Extensive Synovectomy    ANESTHESIA STAFF: Juan Srivastava MD     ANESTHESIA TYPE: General LMA , with interscalene block and catheter placement    COMPLICATIONS: None    FINDINGS: SLAP Tear, Posterior Labral Tear and Extensive Glenohumeral Synovitis    SPECIMEN(S):None    ESTIMATED BLOOD LOSS: Minimal    INDICATION:  Briefly, the patient is a 46 y o   male with right shoulder pain following an arthroscopic subacromial decompression  MRI arthrogram confirmed a SLAP tear  The patient elected for arthroscopic treatment  Informed consent was obtained after a thorough discussion of the risks and benefits of the procedure, as well as alternatives to the procedure  OPERATIVE TECHNIQUE:  On the day of surgery, I identified the patients right shoulder and marked it with my initials  The patient was taken to the operating room where anesthesia was induced and 2 grams of IV Cefazolin were given  The patient was examined in the supine position and was found to have full range of motion of the right shoulder with no instability   The patient was then positioned in the [address] [place] [address] position   All bony prominences were padded  The shoulder was prepped and draped in normal sterile fashion  After a time-out for safety, a standard posterior arthroscopic portal was made  Glenohumeral evaluation revealed intact glenohumeral articular cartilage with no loose bodies  There was a significant amount of glenohumeral synovitis in the rotator interval superior and posterior joint  There was a Type 2 SLAP tear with extension to a posterior labral tear  The anterior and inferior glenoid labrum were without tear  The undersurface of the supraspinatus, the subscapularis and the infraspinatus were without tear  The long head of biceps was seen exiting normally without subluxation or partial tear  Given the failure of his previous surgical intervention it was indicated to proceed forward with SLAP repair and posterior labral repair using 2 3 0mm Gryphon anchors  The 1st anchor was placed just posterior to the biceps anchor and a simple stitch was placed around the labrum tied off reducing the slap tear to the superior glenoid anatomically  The 2nd anchor was placed at the 9:00 position on the glenoid and a simple stitch was used to fixate the posterior labral tear  The labrum was probed and found to require no further fixation  Given the extensive amount of intra-articular synovitis a synovectomy was performed to help treat the synovitis as a cause of pain as well as to help prevent adhesive capsulitis  The rotator interval was released in addition to the synovectomy of the rotator interval, the superior and posterior glenohumeral joint also underwent synovectomy using electrocautery  The area was then irrigated  Scope was withdrawn  Wounds were closed with 4-0 Monocryl and Histoacryl  Sterile dressings and a sling with an abduction pillow was placed  The patient was awoken without complication and returned to the recovery room in good condition   We will see the patient back in the office next week to initiate therapy following SLAP repair rehabilitation protocol  At the end of procedure, the counts were correct       PATIENT DISPOSITION:  Stable to PACU    SIGNATURE: Radha Munoz MD  DATE: August 22, 2018  TIME: 10:15 AM

## 2018-08-22 NOTE — ANESTHESIA PREPROCEDURE EVALUATION
Review of Systems/Medical History  Patient summary reviewed  Chart reviewed  No history of anesthetic complications     Cardiovascular  EKG reviewed, Hyperlipidemia, Hypertension ,    Pulmonary  Negative pulmonary ROS No sleep apnea ,        GI/Hepatic  Negative GI/hepatic ROS   No GERD ,        Negative  ROS        Endo/Other  Negative endo/other ROS      GYN       Hematology  Negative hematology ROS      Musculoskeletal  Negative musculoskeletal ROS        Neurology    Headaches,    Psychology   Negative psychology ROS              Physical Exam    Airway    Mallampati score: II  TM Distance: >3 FB  Neck ROM: full     Dental   No notable dental hx     Cardiovascular  Cardiovascular exam normal    Pulmonary  Pulmonary exam normal     Other Findings      Lab Results   Component Value Date    WBC 6 24 04/12/2018    HGB 15 2 04/12/2018     04/12/2018     Lab Results   Component Value Date     04/12/2018    K 3 7 04/12/2018    BUN 21 04/12/2018    CREATININE 1 37 (H) 04/12/2018     Lab Results   Component Value Date    INR 0 99 04/12/2018     Lab Results   Component Value Date    HGBA1C 5 3 08/11/2018     Anesthesia Plan  ASA Score- 2     Anesthesia Type- general and regional with ASA Monitors  Additional Monitors:   Airway Plan: LMA  Comment: General with LMA + IS catheter for post op pain control    Plan Factors-    Induction- intravenous  Postoperative Plan-     Informed Consent- Anesthetic plan and risks discussed with patient and spouse  I personally reviewed this patient with the CRNA  Discussed and agreed on the Anesthesia Plan with the CRNA  Darling Paniagua

## 2018-08-22 NOTE — H&P
I identified and marked the patient in the pre-op holding area after confirming the surgical consent  No changes to medical health since the H&P was preformed  The [facility]'s [facility] to ensure compliance with applicable state laws

## 2018-08-22 NOTE — ANESTHESIA POSTPROCEDURE EVALUATION
Post-Op Assessment Note      CV Status:  Stable    Mental Status:  Alert and awake    Hydration Status:  Euvolemic    PONV Controlled:  Controlled    Airway Patency:  Patent    Post Op Vitals Reviewed: Yes          Staff: CRNA     Post-op block assessment: catheter intact        BP      Temp      Pulse     Resp      SpO2

## 2018-08-24 ENCOUNTER — APPOINTMENT (OUTPATIENT)
Dept: PHYSICAL THERAPY | Facility: CLINIC | Age: 53
End: 2018-08-24
Payer: COMMERCIAL

## 2018-08-27 ENCOUNTER — OFFICE VISIT (OUTPATIENT)
Dept: OBGYN CLINIC | Facility: HOSPITAL | Age: 53
End: 2018-08-27

## 2018-08-27 VITALS
HEART RATE: 61 BPM | HEIGHT: 67 IN | DIASTOLIC BLOOD PRESSURE: 85 MMHG | BODY MASS INDEX: 30.7 KG/M2 | WEIGHT: 195.6 LBS | SYSTOLIC BLOOD PRESSURE: 123 MMHG

## 2018-08-27 DIAGNOSIS — Z47.89 AFTERCARE FOLLOWING SURGERY OF THE MUSCULOSKELETAL SYSTEM: Primary | ICD-10-CM

## 2018-08-27 PROCEDURE — 99024 POSTOP FOLLOW-UP VISIT: CPT | Performed by: PHYSICIAN ASSISTANT

## 2018-08-27 NOTE — PROGRESS NOTES
Assessment:     No diagnosis found  Plan:        Patient is doing well postoperatively  Operative note, images, and rehab protocol were discussed  All questions were addressed to the patient's satisfaction  Patient has an appointment with PT  Follow-up will be in 2 months to assess patient's progress  Subjective:     Patient ID: Dahlia Baugh is a 46 y o  male  Chief Complaint:    HPI  Patient presents to the office status post right shoulder arthroscopy with slap repair, posterior labral repair and extensive synovectomy, 08/22/2018  Patient is doing well postoperatively and his pain is managed with prescribed analgesics  He has an appointment with physical therapy tomorrow  He has no new concerns  Social History     Occupational History    Not on file  Social History Main Topics    Smoking status: Never Smoker    Smokeless tobacco: Never Used    Alcohol use Yes      Comment: socially    Drug use: No    Sexual activity: Not on file      Review of Systems   Respiratory: Negative  Musculoskeletal: Positive for arthralgias and myalgias  Skin: Positive for wound  Neurological: Positive for weakness  Negative for numbness  Psychiatric/Behavioral: Negative  Objective:     Ortho ExamPhysical Exam   Constitutional: He is oriented to person, place, and time  He appears well-developed and well-nourished  HENT:   Head: Normocephalic  Cardiovascular: Intact distal pulses  Pulmonary/Chest: Effort normal    Musculoskeletal:   Arm in sling  Range of motion and strength not tested  Neurological: He is alert and oriented to person, place, and time  Skin: Skin is warm and dry  Incisions dry and clean  Psychiatric: He has a normal mood and affect   His behavior is normal

## 2018-08-28 ENCOUNTER — EVALUATION (OUTPATIENT)
Dept: PHYSICAL THERAPY | Facility: CLINIC | Age: 53
End: 2018-08-28
Payer: COMMERCIAL

## 2018-08-28 DIAGNOSIS — S43.431D SUPERIOR GLENOID LABRUM LESION OF RIGHT SHOULDER, SUBSEQUENT ENCOUNTER: ICD-10-CM

## 2018-08-28 PROCEDURE — G8990 OTHER PT/OT CURRENT STATUS: HCPCS | Performed by: PHYSICAL THERAPIST

## 2018-08-28 PROCEDURE — G8991 OTHER PT/OT GOAL STATUS: HCPCS | Performed by: PHYSICAL THERAPIST

## 2018-08-28 PROCEDURE — 97164 PT RE-EVAL EST PLAN CARE: CPT | Performed by: PHYSICAL THERAPIST

## 2018-08-28 PROCEDURE — 97140 MANUAL THERAPY 1/> REGIONS: CPT | Performed by: PHYSICAL THERAPIST

## 2018-08-28 PROCEDURE — 97110 THERAPEUTIC EXERCISES: CPT | Performed by: PHYSICAL THERAPIST

## 2018-08-28 NOTE — PROGRESS NOTES
PT Re-Evaluation     Today's date: 2018  Patient name: Mindy Adler  : 1965  MRN: [id number]  Referring provider: John Leo PA-C  Dx:   Encounter Diagnosis     ICD-10-CM    1  Superior glenoid labrum lesion of right shoulder, subsequent encounter S43 431D Ambulatory referral to Physical Therapy       Start Time: 0800  Stop Time: 0858  Total time in clinic (min): 58 minutes    Assessment  Impairments: abnormal or restricted ROM, activity intolerance, impaired physical strength, lacks appropriate home exercise program and pain with function    Assessment details: Patient has demonstrated improved right shoulder ROM and right strength since initiating physical therapy  This allowed for improved tolerance to light ADL's  Patient continues to have pain in right shoulder that limits his ability to complete heavier chores such as yard work and recreational activities such as woodworking  He will continue to benefit from PT in order to continue to improve right shoulder AROM, PROM, and strength as well as decrease pain in order to maximize return to PLOF     Understanding of Dx/Px/POC: good   Prognosis: good    Goals  Short term goals:  1) Patient will demonstrate decrease in pain by 20-50% in 4 weeks  2) Patient will demonstrate right shoulder flexion PROM >110 degrees in 6 weeks  3) Patient will demonstrate right shoulder ER PROM >30  in 6 weeks  Long term goals:  1) Patient will demonstrate ability to reach over head with right hand in 10 weeks  2) Patient will demonstrate ability to dress independently in 8 weeks in 6 weeks  3) Patient will demonstrate ability to complete light house work with right shoulder in 10 weeks  4) Patient will demonstrate independence in HEP 6 weeks        Plan  Patient would benefit from: skilled PT  Planned modality interventions: cryotherapy  Planned therapy interventions: flexibility, graded exercise, home exercise program, therapeutic exercise, strengthening, stretching, patient education, neuromuscular re-education, manual therapy, joint mobilization, IADL retraining, functional ROM exercises and activity modification  Frequency: 2x week  Duration in weeks: 6  Treatment plan discussed with: patient        Subjective Evaluation    History of Present Illness  Mechanism of injury: Patient reports that he underwent a right shoulder SLAP and posterior labrum repair on 2018 by Dr Rob Valentin and Nicky Lin  Patient reports that this was done on an outpatient basis without complication  Patient reports that he had a follow appointment with surgeon yesterday  He has another follow up in 2 months  Patient states that he has had right shoulder pain prior for 2-3 years  No traumatic onset  Patient reports that he has been taking oxycodone post operatively to manage pain  Patient reports that he does not have any numbness and tingling into right hand currently  Patient states that he is unable to use right shoulder/arm for any tasks due to post operative protocol  Patient reports that his goals for physical therapy are to get full right shoulder ROM without pain, lift gallon of milk into fridge  Pain  Current pain ratin  At best pain ratin  At worst pain rating: 10  Location: Interior right shoulder- intermittent, nonvarying, deep 'stabbing"  Quality: knife-like    Social Support  Lives in: multiple-level home    Employment status: working (Patient works full time0 self employed  Patient reports that he mostly work on computer, phone, and drives)  Hand dominance: right      Diagnostic Tests  X-ray: normal        Objective     Active Range of Motion   Left Shoulder   Flexion: 156 degrees   Abduction: 160 degrees   External rotation BTH: T4   Internal rotation BTB: T9     Additional Active Range of Motion Details  AROM of right shoulder not assessed due to being contraindicated per post operative protocol       Passive Range of Motion   Left Shoulder Internal rotation 45°: with pain    Right Shoulder   Flexion: 80 degrees     Strength/Myotome Testing     Left Shoulder     Planes of Motion   Abduction: 5   External rotation at 0°: 4+   Internal rotation at 0°: 5       Flowsheet Rows      Most Recent Value   PT/OT G-Codes   Current Score  32   Projected Score  65   Assessment Type  Re-evaluation   G code set  Other PT/OT Primary   Other PT Primary Current Status ()  CL   Other PT Primary Goal Status ()  CJ       Precautions: HTN, Right SLAP and posterior labrum repair  8/22 (protocol attached to chart and scanned into media        Objective: See treatment diary below      Daily Treatment Diary     Manual  8/28            Shoulder flexion PROM within limits TK                                                                    Exercise Diary  8/28            Right wrist flexion/extension 20x            Right finger flexion/extension 20x            Right finder abd/add 20x            Right elbow PROM flexion/extension 10x                                                                                                                                                                                                                                Modalities

## 2018-08-29 ENCOUNTER — APPOINTMENT (OUTPATIENT)
Dept: PHYSICAL THERAPY | Facility: CLINIC | Age: 53
End: 2018-08-29
Payer: COMMERCIAL

## 2018-08-30 ENCOUNTER — OFFICE VISIT (OUTPATIENT)
Dept: PHYSICAL THERAPY | Facility: CLINIC | Age: 53
End: 2018-08-30
Payer: COMMERCIAL

## 2018-08-30 DIAGNOSIS — M75.41 IMPINGEMENT SYNDROME OF RIGHT SHOULDER: ICD-10-CM

## 2018-08-30 DIAGNOSIS — S43.431D SUPERIOR GLENOID LABRUM LESION OF RIGHT SHOULDER, SUBSEQUENT ENCOUNTER: Primary | ICD-10-CM

## 2018-08-30 DIAGNOSIS — M25.511 CHRONIC RIGHT SHOULDER PAIN: ICD-10-CM

## 2018-08-30 DIAGNOSIS — M25.511 RIGHT SHOULDER PAIN, UNSPECIFIED CHRONICITY: ICD-10-CM

## 2018-08-30 DIAGNOSIS — G89.29 CHRONIC RIGHT SHOULDER PAIN: ICD-10-CM

## 2018-08-30 PROCEDURE — 97140 MANUAL THERAPY 1/> REGIONS: CPT

## 2018-08-30 PROCEDURE — 97110 THERAPEUTIC EXERCISES: CPT

## 2018-08-30 NOTE — PROGRESS NOTES
Daily Note     Today's date: 2018  Patient name: Ivelisse Pham  : 1965  MRN: [id number]  Referring provider: Richy Cárdenas PA-C  Dx:   Encounter Diagnosis     ICD-10-CM    1  Superior glenoid labrum lesion of right shoulder, subsequent encounter S43 431D    2  Chronic right shoulder pain M25 511     G89 29    3  Impingement syndrome of right shoulder M75 41    4  Right shoulder pain, unspecified chronicity M25 511                   Subjective: Upon presentation patient repots experiencing pain in R shoulder  Objective: See treatment diary below     Precautions: HTN, Right SLAP and posterior labrum repair   (protocol attached to chart and scanned into media        Objective: See treatment diary below      Daily Treatment Diary     Manual             Shoulder flexion PROM within limits TK JK                                                                   Exercise Diary             Right wrist flexion/extension 20x 20x 2           Right finger flexion/extension 20x 20x 2           Right finder abd/add 20x 20x 2           Right elbow PROM flexion/extension 10x 2x10           Codman pendulums  15x                                                                                                                                                                                                                  Modalities                                                       Assessment: Patient able to reach 90* of shoulder elevation passively  Patient repots discomfort on the descend of shoulder motion, however does not remain when resting  Patient required verbal cuing to facilitate proper form during codman pendulums  Patient deferred CP today and offers no increase in sx post session  Plan: Cont per POC

## 2018-08-31 ENCOUNTER — APPOINTMENT (OUTPATIENT)
Dept: PHYSICAL THERAPY | Facility: CLINIC | Age: 53
End: 2018-08-31
Payer: COMMERCIAL

## 2018-09-04 ENCOUNTER — OFFICE VISIT (OUTPATIENT)
Dept: PHYSICAL THERAPY | Facility: CLINIC | Age: 53
End: 2018-09-04
Payer: COMMERCIAL

## 2018-09-04 DIAGNOSIS — M25.511 RIGHT SHOULDER PAIN, UNSPECIFIED CHRONICITY: ICD-10-CM

## 2018-09-04 DIAGNOSIS — G89.29 CHRONIC RIGHT SHOULDER PAIN: ICD-10-CM

## 2018-09-04 DIAGNOSIS — M25.511 CHRONIC RIGHT SHOULDER PAIN: ICD-10-CM

## 2018-09-04 DIAGNOSIS — M75.41 IMPINGEMENT SYNDROME OF RIGHT SHOULDER: ICD-10-CM

## 2018-09-04 DIAGNOSIS — S43.431D SUPERIOR GLENOID LABRUM LESION OF RIGHT SHOULDER, SUBSEQUENT ENCOUNTER: Primary | ICD-10-CM

## 2018-09-04 PROCEDURE — 97140 MANUAL THERAPY 1/> REGIONS: CPT

## 2018-09-04 PROCEDURE — 97110 THERAPEUTIC EXERCISES: CPT

## 2018-09-04 NOTE — PROGRESS NOTES
Daily Note     Today's date: 2018  Patient name: Favian Caro  : 1965  MRN: [id number]  Referring provider: Michael Gilbert PA-C  Dx:   Encounter Diagnosis     ICD-10-CM    1  Superior glenoid labrum lesion of right shoulder, subsequent encounter S43 431D    2  Chronic right shoulder pain M25 511     G89 29    3  Impingement syndrome of right shoulder M75 41    4  Right shoulder pain, unspecified chronicity M25 511        Subjective: Upon presentation patient denies shoulder pain however reports he has "good days and bad days" and that pain will come on at random times  Objective: See treatment diary below     Precautions: HTN, Right SLAP and posterior labrum repair   (protocol attached to chart and scanned into media        Objective: See treatment diary below      Daily Treatment Diary     Manual            Shoulder flexion PROM within protocol limits flexion to (90 NO ER, IR) TK JK JK- 18 min                                                                  Exercise Diary            Right wrist flexion/extension 20x 20x 2 20x2          Right finger flexion/extension 20x 20x 2 20x2          Right finder abd/add 20x 20x 2 20x2          Right elbow PROM flexion/extension 10x 2x10 2x10          Codman pendulums  15x 2x10                                                                                                                                                                                                                 Modalities                                                       Assessment: Patient able to reach 90* of shoulder elevation passively  Patient continues to experience occasional discomfort on the descend of shoulder motion, however does not remain when resting  He also reports some discomfort with pendulums and continues to require  verbal cuing to facilitate proper form during codman pendulums   Patient deferred CP today and offers no increase in sx post session  Plan: Cont per POC

## 2018-09-06 ENCOUNTER — OFFICE VISIT (OUTPATIENT)
Dept: PHYSICAL THERAPY | Facility: CLINIC | Age: 53
End: 2018-09-06
Payer: COMMERCIAL

## 2018-09-06 DIAGNOSIS — M25.511 CHRONIC RIGHT SHOULDER PAIN: ICD-10-CM

## 2018-09-06 DIAGNOSIS — G89.29 CHRONIC RIGHT SHOULDER PAIN: ICD-10-CM

## 2018-09-06 DIAGNOSIS — M75.41 IMPINGEMENT SYNDROME OF RIGHT SHOULDER: ICD-10-CM

## 2018-09-06 DIAGNOSIS — S43.431D SUPERIOR GLENOID LABRUM LESION OF RIGHT SHOULDER, SUBSEQUENT ENCOUNTER: Primary | ICD-10-CM

## 2018-09-06 DIAGNOSIS — M25.511 RIGHT SHOULDER PAIN, UNSPECIFIED CHRONICITY: ICD-10-CM

## 2018-09-06 PROCEDURE — 97110 THERAPEUTIC EXERCISES: CPT

## 2018-09-06 PROCEDURE — 97140 MANUAL THERAPY 1/> REGIONS: CPT

## 2018-09-06 NOTE — PROGRESS NOTES
Daily Note     Today's date: 2018  Patient name: Selene Steiner  : 1965  MRN: [id number]  Referring provider: Clarita Epley, PA-C  Dx:   Encounter Diagnosis     ICD-10-CM    1  Superior glenoid labrum lesion of right shoulder, subsequent encounter S43 431D    2  Chronic right shoulder pain M25 511     G89 29    3  Impingement syndrome of right shoulder M75 41    4  Right shoulder pain, unspecified chronicity M25 511        Subjective: Upon presentation patient reprots experiencing some pain when he was trying to fall asleep last night however reports the pain did not last long and he was able to sleep through the night  Objective: See treatment diary below     Precautions: HTN, Right SLAP and posterior labrum repair   (protocol attached to chart and scanned into media        Objective: See treatment diary below      Daily Treatment Diary     Manual           Shoulder flexion PROM within protocol limits flexion to (90 NO ER, IR) TK JK JK- 18 min JK         Scaption to 0-90 within protocol limts    JK         ER with elbow at side (limit ER to 35)    JK                                       Exercise Diary           Right wrist flexion/extension 20x 20x 2 20x2 20x2         Right finger flexion/extension 20x 20x 2 20x2 20x2         Right finder abd/add 20x 20x 2 20x2 20x2         Right elbow PROM flexion/extension 10x 2x10 2x10 2x10         Codman pendulums  15x 2x10 2x10         Putty    Red 3 min  Modalities                                                       Assessment: Patient able to reach 90* of shoulder elevation passively and in the scapular plane  Denies discomfort during PROM  Improvement in pendulums with patient demonstrating improved technique   Patient offers no increase in sx post session  Plan: Cont per POC

## 2018-09-11 ENCOUNTER — OFFICE VISIT (OUTPATIENT)
Dept: PHYSICAL THERAPY | Facility: CLINIC | Age: 53
End: 2018-09-11
Payer: COMMERCIAL

## 2018-09-11 DIAGNOSIS — M25.511 RIGHT SHOULDER PAIN, UNSPECIFIED CHRONICITY: ICD-10-CM

## 2018-09-11 DIAGNOSIS — M25.511 CHRONIC RIGHT SHOULDER PAIN: ICD-10-CM

## 2018-09-11 DIAGNOSIS — G89.29 CHRONIC RIGHT SHOULDER PAIN: ICD-10-CM

## 2018-09-11 DIAGNOSIS — S43.431D SUPERIOR GLENOID LABRUM LESION OF RIGHT SHOULDER, SUBSEQUENT ENCOUNTER: Primary | ICD-10-CM

## 2018-09-11 PROCEDURE — 97110 THERAPEUTIC EXERCISES: CPT | Performed by: PHYSICAL THERAPIST

## 2018-09-11 PROCEDURE — 97140 MANUAL THERAPY 1/> REGIONS: CPT | Performed by: PHYSICAL THERAPIST

## 2018-09-11 NOTE — PROGRESS NOTES
Daily Note     Today's date: 2018  Patient name: Kristopher Huff  : 1965  MRN: [id number]  Referring provider: Supriya Mcmillan PA-C  Dx:   Encounter Diagnosis     ICD-10-CM    1  Superior glenoid labrum lesion of right shoulder, subsequent encounter S43 431D    2  Chronic right shoulder pain M25 511     G89 29    3  Right shoulder pain, unspecified chronicity M25 511        Start Time: 08  Stop Time: 290  Total time in clinic (min): 42 minutes    Subjective: Patient reports that his shoulder is feeling overall pretty good  He states that he has a constant ache of 1/10  Patent states that he has been compliant with sling        Objective: See treatment diary below  Precautions: HTN, Right SLAP and posterior labrum repair   (protocol attached to chart and scanned into media         Objective: See treatment diary below        Daily Treatment Diary      Manual               Shoulder flexion PROM within protocol limits flexion to (90 NO ER, IR) TK JK JK- 18 min JK  TK             Scaption to 0-90 within protocol limts       JK  TK             ER with elbow at side (limit ER to 35)       JK TK                                                                   Exercise Diary               Right wrist flexion/extension 20x 20x 2 20x2 20x2 20x2             Right finger flexion/extension 20x 20x 2 20x2 20x2  20x2             Right finder abd/add 20x 20x 2 20x2 20x2  20x2             Right elbow PROM flexion/extension 10x 2x10 2x10 2x10  20x2             Codman pendulums   15x 2x10 2x10  20x2             Putty       Red 3 min                                                                                                                                                                                                                                                                                                                                                                       Modalities                                                                                                    Assessment: Patient able to reach protocol limits of right shoulder flexion to 90 degrees and right shoulder ER of 35 degrees  Patient reports right shoulder discomfort with pendulums  He will continue to benefit from PT in order to progress within post operative protocol  Plan: Continue per plan of care

## 2018-09-14 ENCOUNTER — OFFICE VISIT (OUTPATIENT)
Dept: PHYSICAL THERAPY | Facility: CLINIC | Age: 53
End: 2018-09-14
Payer: COMMERCIAL

## 2018-09-14 DIAGNOSIS — M75.41 IMPINGEMENT SYNDROME OF RIGHT SHOULDER: ICD-10-CM

## 2018-09-14 DIAGNOSIS — M25.511 CHRONIC RIGHT SHOULDER PAIN: ICD-10-CM

## 2018-09-14 DIAGNOSIS — G89.29 CHRONIC RIGHT SHOULDER PAIN: ICD-10-CM

## 2018-09-14 DIAGNOSIS — S43.431D SUPERIOR GLENOID LABRUM LESION OF RIGHT SHOULDER, SUBSEQUENT ENCOUNTER: Primary | ICD-10-CM

## 2018-09-14 DIAGNOSIS — M25.511 RIGHT SHOULDER PAIN, UNSPECIFIED CHRONICITY: ICD-10-CM

## 2018-09-14 PROCEDURE — 97140 MANUAL THERAPY 1/> REGIONS: CPT

## 2018-09-14 PROCEDURE — 97110 THERAPEUTIC EXERCISES: CPT

## 2018-09-14 NOTE — PROGRESS NOTES
Daily Note     Today's date: 2018  Patient name: Tiffany Olmos  : 1965  MRN: [id number]  Referring provider: Purvi Fontaine PA-C  Dx:   Encounter Diagnosis     ICD-10-CM    1  Superior glenoid labrum lesion of right shoulder, subsequent encounter S43 431D    2  Chronic right shoulder pain M25 511     G89 29    3  Right shoulder pain, unspecified chronicity M25 511    4  Impingement syndrome of right shoulder M75 41                   Subjective: Patient reports mild discomfort in R shoulder, reports it is due to taking his sling off for doing his work at home on the computer         Objective: See treatment diary below  Precautions: HTN, Right SLAP and posterior labrum repair   (protocol attached to chart and scanned into media      Daily Treatment Diary      Manual             Shoulder flexion PROM within protocol limits flexion to (90 NO ER, IR) TK JK JK- 18 min JK  TK  JK- to tolerance per protocol           Scaption to 0-90 within protocol limts       JK  TK  JK           ER with elbow at side (limit ER to 35)       JK TK  JK                                                                 Exercise Diary             Right wrist flexion/extension 20x 20x 2 20x2 20x2 20x2  20x2           Right finger flexion/extension 20x 20x 2 20x2 20x2  20x2  20x2           Right finder abd/add 20x 20x 2 20x2 20x2  20x2  20x2           Right elbow PROM flexion/extension 10x 2x10 2x10 2x10  20x2  20x2           Codman pendulums   15x 2x10 2x10  20x2  20x2           Putty       Red 3 min      red 3 min           Seated scap retractions            2x10                                                                                                                                                                                                                                                                                                                                         Modalities                                                                                                    Assessment: Patient able to reach 110* per protocol limits of right shoulder flexion and 35* of ER  Improvements in pain levels with pendulums  Patient deferred cp and denies any increase in pain levels post session  Patient would benefit from continued therapy to improve shoulder function within post-op protocol  Plan: Continue per plan of care

## 2018-09-18 ENCOUNTER — OFFICE VISIT (OUTPATIENT)
Dept: PHYSICAL THERAPY | Facility: CLINIC | Age: 53
End: 2018-09-18
Payer: COMMERCIAL

## 2018-09-18 DIAGNOSIS — S43.431D SUPERIOR GLENOID LABRUM LESION OF RIGHT SHOULDER, SUBSEQUENT ENCOUNTER: ICD-10-CM

## 2018-09-18 DIAGNOSIS — Z47.89 AFTERCARE FOLLOWING SURGERY OF THE MUSCULOSKELETAL SYSTEM: Primary | ICD-10-CM

## 2018-09-18 PROCEDURE — 97140 MANUAL THERAPY 1/> REGIONS: CPT | Performed by: PHYSICAL THERAPIST

## 2018-09-18 PROCEDURE — 97110 THERAPEUTIC EXERCISES: CPT | Performed by: PHYSICAL THERAPIST

## 2018-09-18 NOTE — PROGRESS NOTES
Daily Note     Today's date: 2018  Patient name: Rocio Black  : 1965  MRN: [id number]  Referring provider: Ave Fiore PA-C  Dx:   Encounter Diagnosis     ICD-10-CM    1  Aftercare following surgery of the musculoskeletal system Z47 89    2  Superior glenoid labrum lesion of right shoulder, subsequent encounter S43 431D        Start Time: 806  Stop Time: 841  Total time in clinic (min): 35 minutes    Subjective: Patient reports that his shoulder has been doing better  He states that he has been getting out of sling a little more  He states that he prefers to wear his sling while sitting as it is more comfortable         Objective: See treatment diary below  Precautions: HTN, Right SLAP and posterior labrum repair   (protocol attached to chart and scanned into media      Daily Treatment Diary      Manual           Shoulder flexion PROM within protocol limits flexion as tolerated NO ER, IR) TK JK JK- 18 min JK  TK  JK- to tolerance per protocol  TK         Scaption to        JK  TK  JK TK         ER with elbow at side       JK TK  JK  TK                                                               Exercise Diary           Right wrist flexion/extension 20x 20x 2 20x2 20x2 20x2  20x2 20x2         Right finger flexion/extension 20x 20x 2 20x2 20x2  20x2  20x2 20x2         Right finder abd/add 20x 20x 2 20x2 20x2  20x2  20x2  20x2         Right elbow PROM flexion/extension 10x 2x10 2x10 2x10  20x2  20x2  20x2         Codman pendulums   15x 2x10 2x10  20x2  20x2  20x2         Putty       Red 3 min      red 3 min   red 3 min         Seated scap retractions            2x10   2x10                                                                                                                                                                                                                                                                                                                                       Modalities                                                                                                       Assessment: Patient able to demonstrate improvement in right shoulder passive flexion this date as he was able to reach 122 degrees this visit  Patient was able to reach 39 degrees of right shoulder ER PROM with arm at 20 degrees of abduction  IR PROM continues to be held per protocol  Patient presenting to physical therapy wearing sling  He states that he has been getting out of sling a little but is more comfortable when wearing sling while sitting  Patient inquired about performing stationary bike at home  He was educated that he is cleared to perform light cardio training per protocol, however he shoulder not utilize surgical shoulder actively or bear weight through it at this time  He will continue to benefit from PT in order to progress within post operative protocol  Plan: Continue per plan of care

## 2018-09-20 PROCEDURE — G8990 OTHER PT/OT CURRENT STATUS: HCPCS

## 2018-09-20 PROCEDURE — G8991 OTHER PT/OT GOAL STATUS: HCPCS

## 2018-09-21 ENCOUNTER — OFFICE VISIT (OUTPATIENT)
Dept: PHYSICAL THERAPY | Facility: CLINIC | Age: 53
End: 2018-09-21
Payer: COMMERCIAL

## 2018-09-21 DIAGNOSIS — Z47.89 AFTERCARE FOLLOWING SURGERY OF THE MUSCULOSKELETAL SYSTEM: Primary | ICD-10-CM

## 2018-09-21 DIAGNOSIS — S43.431D SUPERIOR GLENOID LABRUM LESION OF RIGHT SHOULDER, SUBSEQUENT ENCOUNTER: ICD-10-CM

## 2018-09-21 DIAGNOSIS — G89.29 CHRONIC RIGHT SHOULDER PAIN: ICD-10-CM

## 2018-09-21 DIAGNOSIS — M75.41 IMPINGEMENT SYNDROME OF RIGHT SHOULDER: ICD-10-CM

## 2018-09-21 DIAGNOSIS — M25.511 CHRONIC RIGHT SHOULDER PAIN: ICD-10-CM

## 2018-09-21 DIAGNOSIS — M25.511 RIGHT SHOULDER PAIN, UNSPECIFIED CHRONICITY: ICD-10-CM

## 2018-09-21 PROCEDURE — 97112 NEUROMUSCULAR REEDUCATION: CPT

## 2018-09-21 PROCEDURE — 97110 THERAPEUTIC EXERCISES: CPT

## 2018-09-21 PROCEDURE — 97140 MANUAL THERAPY 1/> REGIONS: CPT

## 2018-09-21 NOTE — PROGRESS NOTES
Daily Note     Today's date: 2018  Patient name: Anil Ko  : 1965  MRN: [id number]  Referring provider: Gwendolyn Torres PA-C  Dx:   Encounter Diagnosis     ICD-10-CM    1  Aftercare following surgery of the musculoskeletal system Z47 89    2  Superior glenoid labrum lesion of right shoulder, subsequent encounter S43 431D    3  Chronic right shoulder pain M25 511     G89 29    4  Right shoulder pain, unspecified chronicity M25 511    5  Impingement syndrome of right shoulder M75 41                   Subjective: Upon presentation patient repots that his shoulder is feeling better, reports he has not been wearing his sling as often         Objective: See treatment diary below  Precautions: HTN, Right SLAP and posterior labrum repair   (protocol attached to chart and scanned into media      Daily Treatment Diary      Manual         Shoulder flexion PROM within protocol limits flexion as tolerated NO ER, IR) TK JK JK- 18 min JK  TK  JK- to tolerance per protocol  TK  JK       Scaption to        JK  TK  JK TK  JK       ER with elbow at side       JK TK  JK  TK  JK                                                             Exercise Diary         Right wrist flexion/extension 20x 20x 2 20x2 20x2 20x2  20x2 20x2  20x2       Right finger flexion/extension 20x 20x 2 20x2 20x2  20x2  20x2 20x2  20x2       Right finder abd/add 20x 20x 2 20x2 20x2  20x2  20x2  20x2  20x2       Right elbow PROM flexion/extension 10x 2x10 2x10 2x10  20x2  20x2  20x2 20x2       Codman pendulums   15x 2x10 2x10  20x2  20x2  20x2  20x2       Putty       Red 3 min      red 3 min   red 3 min  red 3 min       Seated scap retractions            2x10   2x10  2x10                                                                                                                                                                                                                                                                                                                                     Modalities                                                                                                       Assessment: Patient demonstrating improved PROM, shoulder flexion 135* and 45* of ER this visit  Minor discomfort noted at end ranges, PROM kept to patients tolerance per Protocol  Patient offers no complaints post session  Plan: Continue per plan of care

## 2018-09-25 ENCOUNTER — OFFICE VISIT (OUTPATIENT)
Dept: PHYSICAL THERAPY | Facility: CLINIC | Age: 53
End: 2018-09-25
Payer: COMMERCIAL

## 2018-09-25 DIAGNOSIS — M25.511 CHRONIC RIGHT SHOULDER PAIN: ICD-10-CM

## 2018-09-25 DIAGNOSIS — M25.511 RIGHT SHOULDER PAIN, UNSPECIFIED CHRONICITY: ICD-10-CM

## 2018-09-25 DIAGNOSIS — G89.29 CHRONIC RIGHT SHOULDER PAIN: ICD-10-CM

## 2018-09-25 DIAGNOSIS — S43.431D SUPERIOR GLENOID LABRUM LESION OF RIGHT SHOULDER, SUBSEQUENT ENCOUNTER: ICD-10-CM

## 2018-09-25 DIAGNOSIS — Z47.89 AFTERCARE FOLLOWING SURGERY OF THE MUSCULOSKELETAL SYSTEM: Primary | ICD-10-CM

## 2018-09-25 PROCEDURE — 97140 MANUAL THERAPY 1/> REGIONS: CPT | Performed by: PHYSICAL THERAPIST

## 2018-09-25 PROCEDURE — 97112 NEUROMUSCULAR REEDUCATION: CPT | Performed by: PHYSICAL THERAPIST

## 2018-09-25 PROCEDURE — 97110 THERAPEUTIC EXERCISES: CPT | Performed by: PHYSICAL THERAPIST

## 2018-09-25 NOTE — PROGRESS NOTES
Daily Note     Today's date: 2018  Patient name: Altagracia Jade  : 1965  MRN: [id number]  Referring provider: Román Medina PA-C  Dx:   Encounter Diagnosis     ICD-10-CM    1  Aftercare following surgery of the musculoskeletal system Z47 89    2  Chronic right shoulder pain M25 511     G89 29    3  Superior glenoid labrum lesion of right shoulder, subsequent encounter S43 431D    4  Right shoulder pain, unspecified chronicity M25 511        Start Time: 0800  Stop Time: 828  Total time in clinic (min): 28 minutes    Subjective: Patient reports that he is not currently having any pain  He reports that he did have one instance of pain over the weekend  Patient reports that he went to reach for a battery rolling off a table with his right hand         Objective: See treatment diary below  Precautions: HTN, Right SLAP and posterior labrum repair   (protocol attached to chart and scanned into media      Daily Treatment Diary      Manual       Shoulder flexion PROM within protocol limits flexion as tolerated TK JK JK- 18 min JK  TK  JK- to tolerance per protocol  TK  JK  TK     Scaption PROM       JK  TK [place] TK  JK  TK     ER with elbow at side       JK TK  JK  TK  JK  TK                                                           Exercise Diary       Right wrist flexion/extension 20x 20x 2 20x2 20x2 20x2  20x2 20x2  20x2  20x2     Right finger flexion/extension 20x 20x 2 20x2 20x2  20x2  20x2 20x2  20x2  20x2     Right finder abd/add 20x 20x 2 20x2 20x2  20x2  20x2  20x2  20x2  20x2     Right elbow PROM flexion/extension 10x 2x10 2x10 2x10  20x2  20x2  20x2 20x2  20x2     Codman pendulums   15x 2x10 2x10  20x2  20x2  20x2  20x2  20x2     Putty       Red 3 min      red 3 min   red 3 min  red 3 min  red 3 min     Seated scap retractions            2x10   2x10  2x10   2x10                                                                                                                                                                                                                                                                                                                                   Modalities                                                                                                          Assessment: Patient was able to reach 135 degrees of flexion PROM of right arm this visit  Patient was able to obtain 35 degrees of elbow external rotation at 20 degrees of right arm abduction  Patient's motion is currently limited by pain as he has empty end feel at reported end ranges of motion  He will continue to benefit from PT in order to improve right shoulder PROM within protocol       Plan: Initiate IR PROM per protocol

## 2018-09-27 ENCOUNTER — APPOINTMENT (EMERGENCY)
Dept: CT IMAGING | Facility: HOSPITAL | Age: 53
End: 2018-09-27
Payer: COMMERCIAL

## 2018-09-27 ENCOUNTER — HOSPITAL ENCOUNTER (EMERGENCY)
Facility: HOSPITAL | Age: 53
Discharge: HOME | End: 2018-09-27
Attending: EMERGENCY MEDICINE
Payer: COMMERCIAL

## 2018-09-27 VITALS
RESPIRATION RATE: 18 BRPM | WEIGHT: 185 LBS | SYSTOLIC BLOOD PRESSURE: 131 MMHG | OXYGEN SATURATION: 96 % | TEMPERATURE: 97.9 F | DIASTOLIC BLOOD PRESSURE: 89 MMHG | HEART RATE: 53 BPM | BODY MASS INDEX: 28.98 KG/M2

## 2018-09-27 DIAGNOSIS — N20.0 KIDNEY STONE: Primary | ICD-10-CM

## 2018-09-27 DIAGNOSIS — R79.89 ELEVATED SERUM CREATININE: ICD-10-CM

## 2018-09-27 LAB
ALBUMIN SERPL BCP-MCNC: 3.7 G/DL (ref 3.5–5)
ALP SERPL-CCNC: 126 U/L (ref 46–116)
ALT SERPL W P-5'-P-CCNC: 15 U/L (ref 12–78)
ANION GAP SERPL CALCULATED.3IONS-SCNC: 7 MMOL/L (ref 4–13)
AST SERPL W P-5'-P-CCNC: 17 U/L (ref 5–45)
BACTERIA UR QL AUTO: ABNORMAL /HPF
BASOPHILS # BLD AUTO: 0.05 THOUSANDS/ΜL (ref 0–0.1)
BASOPHILS NFR BLD AUTO: 1 % (ref 0–1)
BILIRUB SERPL-MCNC: 0.4 MG/DL (ref 0.2–1)
BILIRUB UR QL STRIP: NEGATIVE
BUN SERPL-MCNC: 21 MG/DL (ref 5–25)
CALCIUM SERPL-MCNC: 8.3 MG/DL (ref 8.3–10.1)
CHLORIDE SERPL-SCNC: 109 MMOL/L (ref 100–108)
CLARITY UR: CLEAR
CO2 SERPL-SCNC: 27 MMOL/L (ref 21–32)
COLOR UR: YELLOW
CREAT SERPL-MCNC: 1.54 MG/DL (ref 0.6–1.3)
EOSINOPHIL # BLD AUTO: 0.26 THOUSAND/ΜL (ref 0–0.61)
EOSINOPHIL NFR BLD AUTO: 5 % (ref 0–6)
ERYTHROCYTE [DISTWIDTH] IN BLOOD BY AUTOMATED COUNT: 12.6 % (ref 11.6–15.1)
GFR SERPL CREATININE-BSD FRML MDRD: 51 ML/MIN/1.73SQ M
GLUCOSE SERPL-MCNC: 94 MG/DL (ref 65–140)
GLUCOSE UR STRIP-MCNC: NEGATIVE MG/DL
HCT VFR BLD AUTO: 43.7 % (ref 36.5–49.3)
HGB BLD-MCNC: 15.1 G/DL (ref 12–17)
HGB UR QL STRIP.AUTO: ABNORMAL
IMM GRANULOCYTES # BLD AUTO: 0.01 THOUSAND/UL (ref 0–0.2)
IMM GRANULOCYTES NFR BLD AUTO: 0 % (ref 0–2)
KETONES UR STRIP-MCNC: NEGATIVE MG/DL
LEUKOCYTE ESTERASE UR QL STRIP: NEGATIVE
LYMPHOCYTES # BLD AUTO: 1.77 THOUSANDS/ΜL (ref 0.6–4.47)
LYMPHOCYTES NFR BLD AUTO: 36 % (ref 14–44)
MCH RBC QN AUTO: 31.2 PG (ref 26.8–34.3)
MCHC RBC AUTO-ENTMCNC: 34.6 G/DL (ref 31.4–37.4)
MCV RBC AUTO: 90 FL (ref 82–98)
MONOCYTES # BLD AUTO: 0.68 THOUSAND/ΜL (ref 0.17–1.22)
MONOCYTES NFR BLD AUTO: 14 % (ref 4–12)
NEUTROPHILS # BLD AUTO: 2.09 THOUSANDS/ΜL (ref 1.85–7.62)
NEUTS SEG NFR BLD AUTO: 44 % (ref 43–75)
NITRITE UR QL STRIP: NEGATIVE
NON-SQ EPI CELLS URNS QL MICRO: ABNORMAL /HPF
NRBC BLD AUTO-RTO: 0 /100 WBCS
PH UR STRIP.AUTO: 6.5 [PH] (ref 4.5–8)
PLATELET # BLD AUTO: 166 THOUSANDS/UL (ref 149–390)
PMV BLD AUTO: 10.8 FL (ref 8.9–12.7)
POTASSIUM SERPL-SCNC: 3.6 MMOL/L (ref 3.5–5.3)
PROT SERPL-MCNC: 7.2 G/DL (ref 6.4–8.2)
PROT UR STRIP-MCNC: NEGATIVE MG/DL
RBC # BLD AUTO: 4.84 MILLION/UL (ref 3.88–5.62)
RBC #/AREA URNS AUTO: ABNORMAL /HPF
SODIUM SERPL-SCNC: 143 MMOL/L (ref 136–145)
SP GR UR STRIP.AUTO: 1.02 (ref 1–1.03)
UROBILINOGEN UR QL STRIP.AUTO: 0.2 E.U./DL
WBC # BLD AUTO: 4.86 THOUSAND/UL (ref 4.31–10.16)
WBC #/AREA URNS AUTO: ABNORMAL /HPF

## 2018-09-27 PROCEDURE — 36415 COLL VENOUS BLD VENIPUNCTURE: CPT | Performed by: EMERGENCY MEDICINE

## 2018-09-27 PROCEDURE — 96374 THER/PROPH/DIAG INJ IV PUSH: CPT

## 2018-09-27 PROCEDURE — 74176 CT ABD & PELVIS W/O CONTRAST: CPT

## 2018-09-27 PROCEDURE — 96361 HYDRATE IV INFUSION ADD-ON: CPT

## 2018-09-27 PROCEDURE — 96375 TX/PRO/DX INJ NEW DRUG ADDON: CPT

## 2018-09-27 PROCEDURE — 81001 URINALYSIS AUTO W/SCOPE: CPT | Performed by: EMERGENCY MEDICINE

## 2018-09-27 PROCEDURE — 80053 COMPREHEN METABOLIC PANEL: CPT | Performed by: EMERGENCY MEDICINE

## 2018-09-27 PROCEDURE — 85025 COMPLETE CBC W/AUTO DIFF WBC: CPT | Performed by: EMERGENCY MEDICINE

## 2018-09-27 PROCEDURE — 99284 EMERGENCY DEPT VISIT MOD MDM: CPT

## 2018-09-27 RX ORDER — HYDROCODONE BITARTRATE AND ACETAMINOPHEN 5; 325 MG/1; MG/1
1 TABLET ORAL EVERY 6 HOURS PRN
Qty: 15 TABLET | Refills: 0 | Status: SHIPPED | OUTPATIENT
Start: 2018-09-27 | End: 2018-10-01

## 2018-09-27 RX ORDER — ONDANSETRON 2 MG/ML
4 INJECTION INTRAMUSCULAR; INTRAVENOUS ONCE
Status: COMPLETED | OUTPATIENT
Start: 2018-09-27 | End: 2018-09-27

## 2018-09-27 RX ORDER — HYDROCODONE BITARTRATE AND ACETAMINOPHEN 5; 325 MG/1; MG/1
1 TABLET ORAL ONCE
Status: COMPLETED | OUTPATIENT
Start: 2018-09-27 | End: 2018-09-27

## 2018-09-27 RX ORDER — ONDANSETRON 4 MG/1
4 TABLET, ORALLY DISINTEGRATING ORAL EVERY 8 HOURS PRN
Qty: 12 TABLET | Refills: 0 | Status: SHIPPED | OUTPATIENT
Start: 2018-09-27 | End: 2018-12-31 | Stop reason: ALTCHOICE

## 2018-09-27 RX ORDER — ACETAMINOPHEN 325 MG/1
975 TABLET ORAL ONCE
Status: COMPLETED | OUTPATIENT
Start: 2018-09-27 | End: 2018-09-27

## 2018-09-27 RX ADMIN — ONDANSETRON 4 MG: 2 INJECTION INTRAMUSCULAR; INTRAVENOUS at 04:37

## 2018-09-27 RX ADMIN — HYDROMORPHONE HYDROCHLORIDE 1 MG: 1 INJECTION, SOLUTION INTRAMUSCULAR; INTRAVENOUS; SUBCUTANEOUS at 04:37

## 2018-09-27 RX ADMIN — SODIUM CHLORIDE 1000 ML: 0.9 INJECTION, SOLUTION INTRAVENOUS at 04:36

## 2018-09-27 RX ADMIN — HYDROCODONE BITARTRATE AND ACETAMINOPHEN 1 TABLET: 5; 325 TABLET ORAL at 05:39

## 2018-09-27 RX ADMIN — ACETAMINOPHEN 975 MG: 325 TABLET, FILM COATED ORAL at 04:37

## 2018-09-27 NOTE — DISCHARGE INSTRUCTIONS
Please return if he developed worsening or other concerning symptoms otherwise your to follow up as instructed as discussed  Basic Metabolic Panel   AMBULATORY CARE:   A basic metabolic panel  (BMP) is a group of blood tests  These tests show how well your kidneys work  They also show the levels of glucose (sugar) and electrolytes in your blood  Electrolytes, such as sodium and potassium, affect how your body works  A BMP is often done when you have a physical exam  It may also be done to help diagnose a health condition if you have been ill  How to get ready for the test:  Healthcare providers may tell you not to eat or drink anything, except water, 10 to 12 hours before the test  Several medicines can affect the results of your BMP test  Ask your healthcare provider if you should wait to take your medicines until after your blood is drawn  Wear a short-sleeved or loose shirt on the day of the test  This will make it easier to draw your blood  What you need to know about your test results: Your healthcare provider will explain what your test results mean for you  You may need to return for more blood tests if your results are abnormal    © 2017 [facility] Information is for End User's use only and may not be sold, redistributed or otherwise used for commercial purposes  All illustrations and images included in CareNotes® are the copyrighted property of [employer]  or [employer] Dailey  The above information is an  only  It is not intended as medical advice for individual conditions or treatments  Talk to your doctor, nurse or pharmacist before following any medical regimen to see if it is safe and effective for you  Flank Pain   WHAT YOU NEED TO KNOW:   Flank pain is felt in the area below your ribcage and above your hip bones, often in the lower back  Your pain may be dull or so severe that you cannot get comfortable   The pain may stay in one area or radiate to another area  It may worsen and lighten in waves  Flank pain is often a sign of problems with your urinary tract, such as a kidney stone or infection  DISCHARGE INSTRUCTIONS:   Return to the emergency department if:   · You have a fever  · Your heart is fluttering or jumping  · You see blood in your urine  · Your pain radiates into your lower abdomen and genital area  · You have intense pain in your low back next to your spine  · You are much more tired than usual and have no desire to eat  · You have a headache and your muscles jerk  Contact your healthcare provider if:   · You have an upset stomach and are vomiting  · You have to urinate more often, and with urgency  · Your pain worsens or does not improve, and you cannot get comfortable  · You pass a stone when you urinate  · You have questions or concerns about your condition or care  Medicines: The following medicines may be ordered for you:  · Pain medicine  may help decrease or relieve your pain  Do not wait until the pain is severe before you take your medicine  · Antibiotics  may help treat a urinary tract infection caused by bacteria  · Take your medicine as directed  Contact your healthcare provider if you think your medicine is not helping or if you have side effects  Tell him of her if you are allergic to any medicine  Keep a list of the medicines, vitamins, and herbs you take  Include the amounts, and when and why you take them  Bring the list or the pill bottles to follow-up visits  Carry your medicine list with you in case of an emergency  Follow up with your healthcare provider in 1 to 2 days or as directed:  Write down your questions so you remember to ask them during your visits  © 2017 [employer] [employer] Information is for End User's use only and may not be sold, redistributed or otherwise used for commercial purposes   All illustrations and images included in CareNotes® are the copyrighted property of [employer]  or [employer] Dailey  The above information is an  only  It is not intended as medical advice for individual conditions or treatments  Talk to your doctor, nurse or pharmacist before following any medical regimen to see if it is safe and effective for you  (4) completely dependent
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
## 2018-09-28 ENCOUNTER — APPOINTMENT (OUTPATIENT)
Dept: PHYSICAL THERAPY | Facility: CLINIC | Age: 53
End: 2018-09-28
Payer: COMMERCIAL

## 2018-10-02 ENCOUNTER — OFFICE VISIT (OUTPATIENT)
Dept: PHYSICAL THERAPY | Facility: CLINIC | Age: 53
End: 2018-10-02
Payer: COMMERCIAL

## 2018-10-02 DIAGNOSIS — S43.431D SUPERIOR GLENOID LABRUM LESION OF RIGHT SHOULDER, SUBSEQUENT ENCOUNTER: ICD-10-CM

## 2018-10-02 DIAGNOSIS — G89.29 CHRONIC RIGHT SHOULDER PAIN: ICD-10-CM

## 2018-10-02 DIAGNOSIS — M25.511 RIGHT SHOULDER PAIN, UNSPECIFIED CHRONICITY: ICD-10-CM

## 2018-10-02 DIAGNOSIS — Z47.89 AFTERCARE FOLLOWING SURGERY OF THE MUSCULOSKELETAL SYSTEM: Primary | ICD-10-CM

## 2018-10-02 DIAGNOSIS — M25.511 CHRONIC RIGHT SHOULDER PAIN: ICD-10-CM

## 2018-10-02 PROCEDURE — 97140 MANUAL THERAPY 1/> REGIONS: CPT | Performed by: PHYSICAL THERAPIST

## 2018-10-02 PROCEDURE — 97112 NEUROMUSCULAR REEDUCATION: CPT | Performed by: PHYSICAL THERAPIST

## 2018-10-02 PROCEDURE — 97110 THERAPEUTIC EXERCISES: CPT | Performed by: PHYSICAL THERAPIST

## 2018-10-02 NOTE — PROGRESS NOTES
Daily Note     Today's date: 10/2/2018  Patient name: eLah Fernandez  : 1965  MRN: [id number]  Referring provider: Caleb Up PA-C  Dx:   Encounter Diagnosis     ICD-10-CM    1  Aftercare following surgery of the musculoskeletal system Z47 89    2  Chronic right shoulder pain M25 511     G89 29    3  Superior glenoid labrum lesion of right shoulder, subsequent encounter S43 431D    4  Right shoulder pain, unspecified chronicity M25 511        Start Time: 0800  Stop Time: 08  Total time in clinic (min): 43 minutes    Subjective: Patient reports that he is having a little pain this date  He reports that he got a new 11 month year old dog that he has had to prevent him from getting into things, occasionally using his right arm         Objective: See treatment diary below  Precautions: HTN, Right SLAP and posterior labrum repair   (protocol attached to chart and scanned into media      Daily Treatment Diary      Manual    10/2   Shoulder flexion PROM within protocol limits flexion as tolerated TK JK JK- 18 min JK  TK  JK- to tolerance per protocol  TK  JK  TK TK   Scaption PROM       JK  TK [place] TK [place]  TK  TK   ER with elbow at side       JK TK  JK  TK  JK  TK TK                                                         Exercise Diary    10/2   Right wrist flexion/extension 20x 20x 2 20x2 20x2 20x2  20x2 20x2  20x2  20x2  20x2   Right finger flexion/extension 20x 20x 2 20x2 20x2  20x2  20x2 20x2  20x2  20x2 20x2   Right finder abd/add 20x 20x 2 20x2 20x2  20x2  20x2  20x2  20x2  20x2 20x2   Right elbow PROM flexion/extension 10x 2x10 2x10 2x10  20x2  20x2  20x2 20x2  20x2  20x2   Codman pendulums   15x 2x10 2x10  20x2  20x2  20x2  20x2  20x2 20x2   Putty       Red 3 min      red 3 min   red 3 min  red 3 min  red 3 min  red 3 min   Seated scap retractions            2x10   2x10  2x10   2x10  2x10    Supine cane flexion AAROM                   2 x 10    Supine cane ER AAROM                   2 x 10    FRO                   2 x 10    Table flexion  pball roll out on table                     green ball 2 x 10    Tband ER/IR isometric walkout                   NV                                                                                                                                                                                                         Modalities                                                                                                       Assessment:  Patient was able to reach 141 degrees of passive right shoulder flexion this visit  Patient also demonstrated improve right shoulder ER to 63 degrees at 20 degrees of abduction  Patient was able to reach 42 degrees of right shoulder IR PROM  He tolerated addition of AAROM into both flexion and ER without onset of pain  He will continue to benefit from PT in order to improve right shoulder PROM and initiate AROM and RTC strengthening next visit per protocol  Plan: Initiate right shoulder AROM and RTC strengthening per protocol next visit

## 2018-10-05 ENCOUNTER — APPOINTMENT (OUTPATIENT)
Dept: PHYSICAL THERAPY | Facility: CLINIC | Age: 53
End: 2018-10-05
Payer: COMMERCIAL

## 2018-10-09 ENCOUNTER — OFFICE VISIT (OUTPATIENT)
Dept: PHYSICAL THERAPY | Facility: CLINIC | Age: 53
End: 2018-10-09
Payer: COMMERCIAL

## 2018-10-09 DIAGNOSIS — Z47.89 AFTERCARE FOLLOWING SURGERY OF THE MUSCULOSKELETAL SYSTEM: Primary | ICD-10-CM

## 2018-10-09 DIAGNOSIS — S43.431D SUPERIOR GLENOID LABRUM LESION OF RIGHT SHOULDER, SUBSEQUENT ENCOUNTER: ICD-10-CM

## 2018-10-09 DIAGNOSIS — G89.29 CHRONIC RIGHT SHOULDER PAIN: ICD-10-CM

## 2018-10-09 DIAGNOSIS — M25.511 CHRONIC RIGHT SHOULDER PAIN: ICD-10-CM

## 2018-10-09 PROCEDURE — 97110 THERAPEUTIC EXERCISES: CPT | Performed by: PHYSICAL THERAPIST

## 2018-10-09 PROCEDURE — 97140 MANUAL THERAPY 1/> REGIONS: CPT | Performed by: PHYSICAL THERAPIST

## 2018-10-09 NOTE — PROGRESS NOTES
PT Re-Evaluation     Today's date: 10/9/2018  Patient name: Jayshree Disla  : 1965  MRN: [id number]  Referring provider: Aleyda Umaña PA-C  Dx:   Encounter Diagnosis     ICD-10-CM    1  Aftercare following surgery of the musculoskeletal system Z47 89    2  Chronic right shoulder pain M25 511     G89 29    3  Superior glenoid labrum lesion of right shoulder, subsequent encounter S43 431D        Start Time: 0800  Stop Time: 08  Total time in clinic (min): 51 minutes    Assessment  Impairments: abnormal or restricted ROM, activity intolerance, impaired physical strength, lacks appropriate home exercise program and pain with function    Assessment details: Patient has demonstrated improved right shoulder AROM, PROM, and strength  He also notes an overall decrease in pain  Patient continues to demonstrate limitations in right shoulder AROM, PROM and strength  These deficits are limiting his ability to lift overhead, carry, and complete household chores without right shoulder pain  He will continue to benefit from PT in order to improve right shoulder AROM, PROM, and strength to facilitate safe and pain free return to PLOF      Understanding of Dx/Px/POC: good   Prognosis: good    Goals  Short term goals:  1) Patient will demonstrate decrease in pain by 20-50% in 4 weeks- ongoing  2) Patient will demonstrate right shoulder flexion PROM >110 degrees in 6 weeks- met  3) Patient will demonstrate right shoulder ER PROM >30  in 6 weeks- met  Long term goals:  1) Patient will demonstrate ability to reach over head with right hand in 10 weeks- partially met   2) Patient will demonstrate ability to dress independently in 8 weeks in 6 weeks- met  3) Patient will demonstrate ability to complete light house work with right shoulder in 10 weeks-ongoing  4) Patient will demonstrate independence in HEP 6 weeks-ongoing        Plan  Patient would benefit from: skilled PT  Planned modality interventions: cryotherapy  Planned therapy interventions: flexibility, graded exercise, home exercise program, therapeutic exercise, strengthening, stretching, patient education, neuromuscular re-education, manual therapy, joint mobilization, IADL retraining, functional ROM exercises and activity modification  Frequency: 2x week  Duration in weeks: 6  Treatment plan discussed with: patient        Subjective Evaluation    History of Present Illness  Mechanism of injury: Patient reports that he has noticed improvements in his right shoulder since starting physical therapy  Patient reports that he has noticed an improvement in right shoulder ROM  He also noted a decrease in overall pain  Patient reports that he is able to dress independently  Patient reports that he is able to drive utilizing his right arm  Patient states that he is able to reach is arm overhead, however states that he cannot lift anything overheafd  He states that he has some difficulty with reaching behind his back  He reports that he has returned to 60% PLOF  Pain  Current pain ratin  At best pain ratin  At worst pain ratin  Location: Interior right shoulder- intermittent, nonvarying, deep 'stabbing"  Quality: knife-like    Social Support  Lives in: multiple-level home    Employment status: working (Patient works full time0 self employed  Patient reports that he mostly work on computer, phone, and drives)  Hand dominance: right      Diagnostic Tests  X-ray: normal        Objective     Active Range of Motion   Left Shoulder   Flexion: 156 degrees   Abduction: 160 degrees   External rotation 0°: with pain  External rotation BTH: T4   Internal rotation BTB: T9     Right Shoulder   Flexion: 148 degrees   Abduction: 148 degrees     Additional Active Range of Motion Details  AROM of right shoulder not assessed due to being contraindicated per post operative protocol       Passive Range of Motion   Left Shoulder   Internal rotation 45°: with pain    Right Shoulder   Flexion: 148 degrees   Abduction: 167 degrees   External rotation 45°: 70 degrees   Internal rotation 45°: 30 degrees     Strength/Myotome Testing     Left Shoulder     Planes of Motion   Abduction: 5   External rotation at 0°: 4+   Internal rotation at 0°: 5     Right Shoulder     Planes of Motion   Flexion: 3-   External rotation at 0°: 4-   Internal rotation at 0°: 4-              Objective: See treatment diary below  Precautions: HTN, Right SLAP and posterior labrum repair  8/22 (protocol attached to chart and scanned into media      Daily Treatment Diary      Manual  10/9 8/30 9/4 9/6  9/11  9/14  9/18  9/21  9/25  10/2   Shoulder flexion PROM within protocol limits flexion as tolerated TK JK JK- 18 min JK  TK  JK- to tolerance per protocol  TK  JK  TK TK   Scaption PROM  TK     JK  TK  JK TK  JK  TK  TK   ER PROM TK     JK TK  JK  TK  JK  TK TK                                                         Exercise Diary  10/9 8/  30 9/4 9/6 9/11  9/14 9/18  9/21  9/25  10/2   Right wrist flexion/extension 20x D/C 20x 2 20x2 20x2 20x2  20x2 20x2  20x2  20x2  20x2   Right finger flexion/extension 20x d/c 20x 2 20x2 20x2  20x2  20x2 20x2  20x2  20x2 20x2   Right finder abd/add 20x 20x 2 20x2 20x2  20x2  20x2  20x2  20x2  20x2 20x2   Right elbow PROM flexion/extension 10x 2x10 2x10 2x10  20x2  20x2  20x2 20x2  20x2  20x2   Codman pendulums  15x 15x 2x10 2x10  20x2  20x2  20x2  20x2  20x2 20x2   Putty D/C     Red 3 min      red 3 min   red 3 min  red 3 min  red 3 min  red 3 min   Seated scap retractions  D/C          2x10   2x10  2x10   2x10  2x10    Supine cane flexion AAROM  2 x 10                 2 x 10    Supine cane ER AAROM  2 x 10                 2 x 10    FRO  2 x 10                 2 x 10    Table flexion  pball roll out on table   green ball 2 x 10                  green ball 2 x 10    Tband ER/IR isometric walkout  RTB x10                 NV    Pulley's 5 min                      Lawn chair flexion  Small wedge 2 x 10                      sidelying ER to neutral  2 x 10                      Ball up wall AAROM flexion  Yellow ball 2 x 10                     standing cane flexion NV

## 2018-10-12 ENCOUNTER — OFFICE VISIT (OUTPATIENT)
Dept: PHYSICAL THERAPY | Facility: CLINIC | Age: 53
End: 2018-10-12
Payer: COMMERCIAL

## 2018-10-12 DIAGNOSIS — Z47.89 AFTERCARE FOLLOWING SURGERY OF THE MUSCULOSKELETAL SYSTEM: Primary | ICD-10-CM

## 2018-10-12 DIAGNOSIS — S43.431D SUPERIOR GLENOID LABRUM LESION OF RIGHT SHOULDER, SUBSEQUENT ENCOUNTER: ICD-10-CM

## 2018-10-12 DIAGNOSIS — M25.511 CHRONIC RIGHT SHOULDER PAIN: ICD-10-CM

## 2018-10-12 DIAGNOSIS — M25.511 RIGHT SHOULDER PAIN, UNSPECIFIED CHRONICITY: ICD-10-CM

## 2018-10-12 DIAGNOSIS — M75.41 IMPINGEMENT SYNDROME OF RIGHT SHOULDER: ICD-10-CM

## 2018-10-12 DIAGNOSIS — G89.29 CHRONIC RIGHT SHOULDER PAIN: ICD-10-CM

## 2018-10-12 PROCEDURE — 97110 THERAPEUTIC EXERCISES: CPT

## 2018-10-12 PROCEDURE — 97140 MANUAL THERAPY 1/> REGIONS: CPT

## 2018-10-12 PROCEDURE — 97112 NEUROMUSCULAR REEDUCATION: CPT

## 2018-10-12 NOTE — PROGRESS NOTES
Daily Note     Today's date: 10/12/2018  Patient name: Wang Horton  : 1965  MRN: [id number]  Referring provider: Sangita Lu PA-C  Dx:   Encounter Diagnosis     ICD-10-CM    1  Aftercare following surgery of the musculoskeletal system Z47 89    2  Chronic right shoulder pain M25 511     G89 29    3  Superior glenoid labrum lesion of right shoulder, subsequent encounter S43 431D    4  Right shoulder pain, unspecified chronicity M25 511    5  Impingement syndrome of right shoulder M75 41                   Subjective: Upon presentation patient repots he experiences occasional pain in L deltoid, and reprots his shoulder is slightly sore today from sleeping on it last night         Objective: See treatment diary below  Precautions: HTN, Right SLAP and posterior labrum repair   (protocol attached to chart and scanned into media      Daily Treatment Diary      Manual  10/9 10/11 9/4 9/6  9/11  9/14  9/18  9/21  9/25  10/2   Shoulder flexion PROM within protocol limits flexion as tolerated TK JK JK- 18 min JK  TK  JK- to tolerance per protocol  TK  JK  TK TK   Scaption PROM  TK  JK   JK  TK  JK TK  JK  TK  TK   ER PROM TK  JK   JK TK  JK  TK  JK  TK TK                                                         Exercise Diary  10/9 10/11 9/4 9/6 9/11  9/14 9/18  9/21  9/25  10/2   Right wrist flexion/extension 20x D/C  20x2 20x2 20x2  20x2 20x2  20x2  20x2  20x2   Right finger flexion/extension 20x d/c  20x2 20x2  20x2  20x2 20x2  20x2  20x2 20x2   Right finder abd/add 20x  20x2 20x2  20x2  20x2  20x2  20x2  20x2 20x2   Right elbow PROM flexion/extension 10x  2x10 2x10  20x2  20x2  20x2 20x2  20x2  20x2   Codman pendulums  15x 15x 2x10 2x10  20x2  20x2  20x2  20x2  20x2 20x2   Putty D/C     Red 3 min      red 3 min   red 3 min  red 3 min  red 3 min  red 3 min   Seated scap retractions  D/C          2x10   2x10  2x10   2x10  2x10    Supine cane flexion AAROM  2 x 10  2x10               2 x 10    Supine cane ER AAROM  2 x 10  2x10               2 x 10    FRO  2 x 10  2x10               2 x 10    Table flexion  pball roll out on table   green ball 2 x 10  green 2x10                green ball 2 x 10    Tband ER/IR isometric walkout  RTB x10  RTB 10x               NV    Pulley's 5 min  5 min                    Lawn chair flexion  Small wedge 2 x 10  small wedge 2x10                    sidelying ER to neutral  2 x 10  2x10                    Ball up wall AAROM flexion  Yellow ball 2 x 10  orange 2x10                   standing cane flexion NV  2x10                                                                                               Assessment: Stiffness noted in ER passively this session  Patient able to tolerate full TE Program with no complaints of pain or difficulty  Patient offers no complaints post session  Plan: Cont per POC

## 2018-10-16 ENCOUNTER — OFFICE VISIT (OUTPATIENT)
Dept: PHYSICAL THERAPY | Facility: CLINIC | Age: 53
End: 2018-10-16
Payer: COMMERCIAL

## 2018-10-16 DIAGNOSIS — M25.511 CHRONIC RIGHT SHOULDER PAIN: ICD-10-CM

## 2018-10-16 DIAGNOSIS — G89.29 CHRONIC RIGHT SHOULDER PAIN: ICD-10-CM

## 2018-10-16 DIAGNOSIS — Z47.89 AFTERCARE FOLLOWING SURGERY OF THE MUSCULOSKELETAL SYSTEM: Primary | ICD-10-CM

## 2018-10-16 DIAGNOSIS — S43.431D SUPERIOR GLENOID LABRUM LESION OF RIGHT SHOULDER, SUBSEQUENT ENCOUNTER: ICD-10-CM

## 2018-10-16 DIAGNOSIS — M25.511 RIGHT SHOULDER PAIN, UNSPECIFIED CHRONICITY: ICD-10-CM

## 2018-10-16 DIAGNOSIS — M75.41 IMPINGEMENT SYNDROME OF RIGHT SHOULDER: ICD-10-CM

## 2018-10-16 PROCEDURE — 97140 MANUAL THERAPY 1/> REGIONS: CPT | Performed by: PHYSICAL THERAPIST

## 2018-10-16 PROCEDURE — 97112 NEUROMUSCULAR REEDUCATION: CPT | Performed by: PHYSICAL THERAPIST

## 2018-10-16 PROCEDURE — 97110 THERAPEUTIC EXERCISES: CPT | Performed by: PHYSICAL THERAPIST

## 2018-10-16 NOTE — PROGRESS NOTES
Daily Note     Today's date: 10/16/2018  Patient name: Sherita Fortune  : 1965  MRN: [id number]  Referring provider: Alena Banda PA-C  Dx:   Encounter Diagnosis     ICD-10-CM    1  Aftercare following surgery of the musculoskeletal system Z47 89    2  Chronic right shoulder pain M25 511     G89 29    3  Superior glenoid labrum lesion of right shoulder, subsequent encounter S43 431D    4  Right shoulder pain, unspecified chronicity M25 511    5  Impingement syndrome of right shoulder M75 41        Start Time: 0800  Stop Time: 08  Total time in clinic (min): 46 minutes    Subjective: Patient reports that he does not have any pain in his right shoulder upon arriving today  He reports that he has noticed an improvement in his right shoulder elevation AROM        Objective: See treatment diary below  Precautions: HTN, Right SLAP and posterior labrum repair   (protocol attached to chart and scanned into media      Daily Treatment Diary      Manual  10/9 10/11 9/4 9/6  9/11  9/14  9/18  9/21  9/25  10/2   Shoulder flexion PROM within protocol limits flexion as tolerated TK JK JK- 18 min JK  TK  JK- to tolerance per protocol  TK  JK  TK TK   Scaption PROM  TK  JK   JK  TK  JK TK  JK  TK  TK   ER PROM TK  JK   JK TK  JK  TK  JK  TK TK                                                         Exercise Diary  10/9 10/11 9/4 9/6 9/11  9/14 9/18  9/21  9/25  10/2   Right wrist flexion/extension 20x D/C   20x2 20x2 20x2  20x2 20x2  20x2  20x2  20x2   Right finger flexion/extension 20x d/c   20x2 20x2  20x2  20x2 20x2  20x2  20x2 20x2   Right finder abd/add 20x   20x2 20x2  20x2  20x2  20x2  20x2  20x2 20x2   Right elbow PROM flexion/extension 10x   2x10 2x10  20x2  20x2  20x2 20x2  20x2  20x2   Codman pendulums  15x 15x 2x10 2x10  20x2  20x2  20x2  20x2  20x2 20x2    Supine cane flexion AAROM  2 x 10  2x10  2x10             2 x 10    Supine cane ER AAROM  2 x 10  2x10  2x10             2 x 10    FRO  2 x 10  2x10  2x10             2 x 10    Table flexion  pball roll out on table   green ball 2 x 10  green 2x10  green 2x10              green ball 2 x 10    Tband ER/IR isometric walkout  RTB x10  RTB 10x   RTB 10x             NV    Pulley's 5 min  5 min   5 min                  Lawn chair flexion  Small wedge 2 x 10  small wedge 2x10   small wedge 2x10                  sidelying ER to neutral  2 x 10  2x10  2x10                  Ball up wall AAROM flexion  Yellow ball 2 x 10  orange 2x10  orange 2x10                 standing cane flexion NV  2x10  2x10                 Tband rows     RTB 2 x 10                                                                     Assessment: Patient demonstrating improved right shoulder flexion AROM as he was able to reach 157 degrees this visit  Patient also demonstrated improved right shoulder ER PROM as he was able to reach 80 degrees ER at 45 degrees of abduction  He continues to tolerate RTC and periscapular strengthening without pain  Will progress strengthening as tolerated  He will continue to benefit from PT in order to improve right shoulder AROM, PROM, and strength  Plan: Continue per plan of care

## 2018-10-19 ENCOUNTER — OFFICE VISIT (OUTPATIENT)
Dept: PHYSICAL THERAPY | Facility: CLINIC | Age: 53
End: 2018-10-19
Payer: COMMERCIAL

## 2018-10-19 DIAGNOSIS — G89.29 CHRONIC RIGHT SHOULDER PAIN: ICD-10-CM

## 2018-10-19 DIAGNOSIS — M25.511 RIGHT SHOULDER PAIN, UNSPECIFIED CHRONICITY: ICD-10-CM

## 2018-10-19 DIAGNOSIS — Z47.89 AFTERCARE FOLLOWING SURGERY OF THE MUSCULOSKELETAL SYSTEM: Primary | ICD-10-CM

## 2018-10-19 DIAGNOSIS — M75.41 IMPINGEMENT SYNDROME OF RIGHT SHOULDER: ICD-10-CM

## 2018-10-19 DIAGNOSIS — M25.511 CHRONIC RIGHT SHOULDER PAIN: ICD-10-CM

## 2018-10-19 DIAGNOSIS — S43.431D SUPERIOR GLENOID LABRUM LESION OF RIGHT SHOULDER, SUBSEQUENT ENCOUNTER: ICD-10-CM

## 2018-10-19 PROCEDURE — 97112 NEUROMUSCULAR REEDUCATION: CPT

## 2018-10-19 PROCEDURE — 97110 THERAPEUTIC EXERCISES: CPT

## 2018-10-19 PROCEDURE — 97140 MANUAL THERAPY 1/> REGIONS: CPT

## 2018-10-19 NOTE — PROGRESS NOTES
Daily Note     Today's date: 10/19/2018  Patient name: Whitney Ross  : 1965  MRN: [id number]  Referring provider: Harjit Ibrahim PA-C  Dx:   Encounter Diagnosis     ICD-10-CM    1  Aftercare following surgery of the musculoskeletal system Z47 89    2  Chronic right shoulder pain M25 511     G89 29    3  Superior glenoid labrum lesion of right shoulder, subsequent encounter S43 431D    4  Right shoulder pain, unspecified chronicity M25 511    5  Impingement syndrome of right shoulder M75 41                   Subjective: Patient denies pain upon arrival and reprots he was able to loop his belt throught with his R arm         Objective: See treatment diary below  Precautions: HTN, Right SLAP and posterior labrum repair   (protocol attached to chart and scanned into media      Daily Treatment Diary      Manual  10/9 10/11 10/19 9/6  9/11  9/14  9/18  9/21  9/25  10/2   Shoulder flexion PROM within protocol limits flexion as tolerated TK JK JK JK  TK  JK- to tolerance per protocol  TK  JK  TK TK   Scaption PROM  TK  JK  JK JK  TK  JK TK  JK  TK  TK   ER PROM TK  JK JK JK TK [place]  TK  JK  TK TK                                                         Exercise Diary  10/9 10/11 10/19 9/6 9/11  9/14 9/18  9/21  9/25  10/2   Right wrist flexion/extension 20x D/C    20x2 20x2  20x2 20x2  20x2  20x2  20x2   Right finger flexion/extension 20x d/c    20x2  20x2  20x2 20x2  20x2  20x2 20x2   Right finder abd/add 20x    20x2  20x2  20x2  20x2  20x2  20x2 20x2   Right elbow PROM flexion/extension 10x    2x10  20x2  20x2  20x2 20x2  20x2  20x2   Codman pendulums  15x 15x 2x10 2x10  20x2  20x2  20x2  20x2  20x2 20x2    Supine cane flexion AAROM  2 x 10  2x10  2x10  2x10           2 x 10    Supine cane ER AAROM  2 x 10  2x10  2x10  2x10           2 x 10    FRO  2 x 10  2x10  2x10             2 x 10    Table flexion  pball roll out on table   green ball 2 x 10  green 2x10  green 2x10             green ball 2 x 10    Tband ER/IR isometric walkout  RTB x10  RTB 10x   RTB 10x  RTB 10x           NV    Pulley's 5 min  5 min   5 min 5 min                Lawn chair flexion  Small wedge 2 x 10  small wedge 2x10   small wedge 2x10  small wedge 2x10                sidelying ER to neutral  2 x 10  2x10  2x10  2x10                Ball up wall AAROM flexion  Yellow ball 2 x 10  orange 2x10  orange 2x10  orange 2x10               standing cane flexion NV  2x10  2x10  2x10               Tband rows     RTB 2 x 10  RTB 2x10                FTW        foam roll 2x10                hitch hikers         2x10                   Assessment: Patient able to reach 158 degrees of flexion passively  Patient continues to be able to tolerate RTC and periscapular strengthening without pain, good tolerance to added TE no pain noted  Patient would benefit from continued therapy to  improve right shoulder AROM, PROM, and strength  Plan: Continue per plan of care

## 2018-10-23 ENCOUNTER — OFFICE VISIT (OUTPATIENT)
Dept: PHYSICAL THERAPY | Facility: CLINIC | Age: 53
End: 2018-10-23
Payer: COMMERCIAL

## 2018-10-23 DIAGNOSIS — M25.511 CHRONIC RIGHT SHOULDER PAIN: ICD-10-CM

## 2018-10-23 DIAGNOSIS — G89.29 CHRONIC RIGHT SHOULDER PAIN: ICD-10-CM

## 2018-10-23 DIAGNOSIS — M25.511 RIGHT SHOULDER PAIN, UNSPECIFIED CHRONICITY: ICD-10-CM

## 2018-10-23 DIAGNOSIS — M75.41 IMPINGEMENT SYNDROME OF RIGHT SHOULDER: ICD-10-CM

## 2018-10-23 DIAGNOSIS — Z47.89 AFTERCARE FOLLOWING SURGERY OF THE MUSCULOSKELETAL SYSTEM: Primary | ICD-10-CM

## 2018-10-23 DIAGNOSIS — S43.431D SUPERIOR GLENOID LABRUM LESION OF RIGHT SHOULDER, SUBSEQUENT ENCOUNTER: ICD-10-CM

## 2018-10-23 PROCEDURE — 97112 NEUROMUSCULAR REEDUCATION: CPT | Performed by: PHYSICAL THERAPIST

## 2018-10-23 PROCEDURE — 97140 MANUAL THERAPY 1/> REGIONS: CPT | Performed by: PHYSICAL THERAPIST

## 2018-10-23 PROCEDURE — 97110 THERAPEUTIC EXERCISES: CPT | Performed by: PHYSICAL THERAPIST

## 2018-10-23 NOTE — PROGRESS NOTES
Daily Note     Today's date: 10/23/2018  Patient name: Oleg Ordoñez  : 1965  MRN: [id number]  Referring provider: Patricio Fried PA-C  Dx:   Encounter Diagnosis     ICD-10-CM    1  Aftercare following surgery of the musculoskeletal system Z47 89    2  Chronic right shoulder pain M25 511     G89 29    3  Superior glenoid labrum lesion of right shoulder, subsequent encounter S43 431D    4  Right shoulder pain, unspecified chronicity M25 511    5  Impingement syndrome of right shoulder M75 41                   Subjective: Pt notes that he is feeling okay today, and things are getting better  Objective: See treatment diary below      Assessment: Tolerated treatment fair  Patient demonstrated fatigue post treatment, exhibited good technique with therapeutic exercises, would benefit from continued PT and continues to remain restritcioned in abduction and ER ROM  Plan: Continue per plan of care  Progress treatment as tolerated        Precautions: HTN, Right SLAP and posterior labrum repair   (protocol attached to chart and scanned into media      Daily Treatment Diary      Manual  10/9 10/11 10/19 10/23  9/11  9/14  9/18  9/21  9/25  10/2   Shoulder flexion PROM within protocol limits flexion as tolerated TK JK JK 5'  TK [place]- to tolerance per protocol  TK  JK  TK TK   Scaption PROM  TK  JK [place] 3'  TK  JK TK [place]  TK  TK   ER PROM TK  JK JK 2' TK  JK  TK  JK  TK TK                                                         Exercise Diary  10/9 10/11 10/19 10/23 9/11  9/14 9/18  9/21  9/25  10/2   Right wrist flexion/extension 20x D/C     20x2  20x2 20x2  20x2  20x2  20x2   Right finger flexion/extension 20x d/c      20x2  20x2 20x2  20x2  20x2 20x2   Right finder abd/add 20x      20x2  20x2  20x2  20x2  20x2 20x2   Right elbow PROM flexion/extension 10x      20x2  20x2  20x2 20x2  20x2  20x2   Codman pendulums  15x 15x 2x10   20x2  20x2  20x2  20x2  20x2 20x2    Supine cane flexion AAROM  2 x 10  2x10  2x10  2x10           2 x 10    Supine cane ER AAROM  2 x 10  2x10  2x10  2x10           2 x 10    FRO  2 x 10  2x10  2x10  2x10           2 x 10    Table flexion  pball roll out on table   green ball 2 x 10  green 2x10  green 2x10             green ball 2 x 10    Tband ER/IR isometric walkout  RTB x10  RTB 10x   RTB 10x  RTB 10x           NV    Pulley's 5 min  5 min   5 min 5 min                Lawn chair flexion  Small wedge 2 x 10  small wedge 2x10   small wedge 2x10  small wedge 2x10                sidelying ER to neutral  2 x 10  2x10  2x10  2x10                Ball up wall AAROM flexion  Yellow ball 2 x 10  orange 2x10  orange 2x10  orange 2x10               standing cane flexion NV  2x10  2x10  2x10               Tband rows     RTB 2 x 10  RTB 2x10                FTW        foam roll 2x10               hitch hikers         2x10

## 2018-10-26 ENCOUNTER — OFFICE VISIT (OUTPATIENT)
Dept: PHYSICAL THERAPY | Facility: CLINIC | Age: 53
End: 2018-10-26
Payer: COMMERCIAL

## 2018-10-26 DIAGNOSIS — M25.511 CHRONIC RIGHT SHOULDER PAIN: ICD-10-CM

## 2018-10-26 DIAGNOSIS — M25.511 RIGHT SHOULDER PAIN, UNSPECIFIED CHRONICITY: ICD-10-CM

## 2018-10-26 DIAGNOSIS — M75.41 IMPINGEMENT SYNDROME OF RIGHT SHOULDER: ICD-10-CM

## 2018-10-26 DIAGNOSIS — S43.431D SUPERIOR GLENOID LABRUM LESION OF RIGHT SHOULDER, SUBSEQUENT ENCOUNTER: ICD-10-CM

## 2018-10-26 DIAGNOSIS — G89.29 CHRONIC RIGHT SHOULDER PAIN: ICD-10-CM

## 2018-10-26 DIAGNOSIS — Z47.89 AFTERCARE FOLLOWING SURGERY OF THE MUSCULOSKELETAL SYSTEM: Primary | ICD-10-CM

## 2018-10-26 PROCEDURE — 97110 THERAPEUTIC EXERCISES: CPT

## 2018-10-26 PROCEDURE — G8990 OTHER PT/OT CURRENT STATUS: HCPCS

## 2018-10-26 PROCEDURE — G8991 OTHER PT/OT GOAL STATUS: HCPCS

## 2018-10-26 PROCEDURE — 97112 NEUROMUSCULAR REEDUCATION: CPT

## 2018-10-26 PROCEDURE — 97140 MANUAL THERAPY 1/> REGIONS: CPT

## 2018-10-26 NOTE — PROGRESS NOTES
Daily Note     Today's date: 10/26/2018  Patient name: Todd Mchugh  : 1965  MRN: [id number]  Referring provider: Laz Mata PA-C  Dx:   Encounter Diagnosis     ICD-10-CM    1  Aftercare following surgery of the musculoskeletal system Z47 89    2  Chronic right shoulder pain M25 511     G89 29    3  Superior glenoid labrum lesion of right shoulder, subsequent encounter S43 431D    4  Right shoulder pain, unspecified chronicity M25 511    5  Impingement syndrome of right shoulder M75 41                   Subjective: Pt reports external and internal rotation movements are difficult and painful for patient  Objective: See treatment diary below      Assessment: Patient demonstrates PROM restrictions in ER  Lateral R shoulder discomfort with addition of prone scap stabilization TE  No complaints offered post session  Plan: Continue per plan of care  Progress treatment as tolerated        Precautions: HTN, Right SLAP and posterior labrum repair   (protocol attached to chart and scanned into media      Daily Treatment Diary      Manual  10/9 10/11 10/19 10/23 10/26  9/14  9/18  9/21  9/25  10/2   Shoulder flexion PROM within protocol limits flexion as tolerated TK JK  5' 5' [facility]- to tolerance per protocol  TK  JK  TK TK   Scaption PROM  TK  [facility] 3' 4' [facility] TK [facility]  TK  TK   ER PROM TK  JK  2' 5' [address]  TK TK                                                         Exercise Diary  10/9 10/11 10/19 10/23 10/26  9/14 9/18  9/21  9/25  10/2   Right wrist flexion/extension 20x D/C       20x2 20x2  20x2  20x2  20x2   Right finger flexion/extension 20x d/c       20x2 20x2  20x2  20x2 20x2   Right finder abd/add 20x       20x2  20x2  20x2  20x2 20x2   Right elbow PROM flexion/extension 10x       20x2  20x2 20x2  20x2  20x2   Codman pendulums  15x 15x 2x10    20x2  20x2  20x2  20x2 20x2    Supine cane flexion AAROM  2 x 10  2x10  2x10  2x10 2x10         2 x 10    Supine cane ER AAROM  2 x 10  2x10  2x10  2x10 2x10         2 x 10    FRO  2 x 10  2x10  2x10  2x10 2x10         2 x 10    Table flexion  pball roll out on table   green ball 2 x 10  green 2x10  green 2x10            green ball 2 x 10    Tband ER/IR isometric walkout  RTB x10  RTB 10x   RTB 10x  RTB 10x RTB 2x10         NV    Pulley's 5 min  5 min   5 min 5 min 5 min              Lawn chair flexion  Small wedge 2 x 10  small wedge 2x10   small wedge 2x10  small wedge 2x10 Lg wedge 2x10              sidelying ER to neutral  2 x 10  2x10  2x10  2x10 3x10              Ball up wall AAROM flexion  Yellow ball 2 x 10  orange 2x10  orange 2x10  orange 2x10 orange 2x10             standing cane flexion NV  2x10  2x10  2x10  AROM 2x10             Tband rows     RTB 2 x 10  RTB 2x10  RTB 3x10              FTW        foam roll 2x10 Foam roll 2x10              hitch hikers         2x10  2x10             Prone I T      2x10

## 2018-10-29 ENCOUNTER — OFFICE VISIT (OUTPATIENT)
Dept: OBGYN CLINIC | Facility: HOSPITAL | Age: 53
End: 2018-10-29

## 2018-10-29 VITALS
WEIGHT: 196.4 LBS | DIASTOLIC BLOOD PRESSURE: 110 MMHG | BODY MASS INDEX: 30.76 KG/M2 | HEART RATE: 69 BPM | SYSTOLIC BLOOD PRESSURE: 157 MMHG

## 2018-10-29 DIAGNOSIS — Z47.89 AFTERCARE FOLLOWING SURGERY OF THE MUSCULOSKELETAL SYSTEM: Primary | ICD-10-CM

## 2018-10-29 PROCEDURE — 99024 POSTOP FOLLOW-UP VISIT: CPT | Performed by: PHYSICIAN ASSISTANT

## 2018-10-29 NOTE — PROGRESS NOTES
Assessment:       1  Aftercare following surgery of the musculoskeletal system          Plan:        Patient is doing well postoperatively  All questions were addressed to the patient's satisfaction  Patient should continue with PT  He was reminded to avoid pushing, pulling, or lifting with his right upper extremity  Follow-up will be in 2 months with Dr Imelda Vieyra to assess patient's progress  Subjective:     Patient ID: Alina Kelley is a 48 y o  male  Chief Complaint:    HPI  Patient presents to the office approximately 2 months status post right shoulder  Surgery was on 08/22/2018  He is making excellent progress with physical therapy and his pain is well managed  He does complain of pain with internal rotation and he is awakened at night when he finds himself playing on his right side  Otherwise he is pleased with his progress  Social History     Occupational History    Not on file  Social History Main Topics    Smoking status: Never Smoker    Smokeless tobacco: Never Used    Alcohol use Yes      Comment: socially    Drug use: No    Sexual activity: Not on file      Review of Systems   Respiratory: Negative  Musculoskeletal: Positive for myalgias  Skin: Negative for wound  Neurological: Positive for weakness  Negative for numbness  Psychiatric/Behavioral: Negative  Objective:         Neurological Testing     Additional Neurological Details  Motor and sensation grossly intact  Physical Exam   Constitutional: He is oriented to person, place, and time  He appears well-developed and well-nourished  HENT:   Head: Normocephalic  Cardiovascular: Intact distal pulses  Pulmonary/Chest: Effort normal    Musculoskeletal:   Active forward flexion to 150°, abduction to 90°, internal rotation to right buttocks, and external rotation approximately 60° and equal to left  Strength not tested  Neurological: He is alert and oriented to person, place, and time     Motor and sensation grossly intact  Skin: Skin is warm and dry  Incisions well healed  Psychiatric: He has a normal mood and affect   His behavior is normal

## 2018-10-29 NOTE — PATIENT INSTRUCTIONS
1  Continue PT per protocol  2  No lifting, pulling, or pushing with right arm  3   Follow-up in 2 months with Dr Andrés Zamudio

## 2018-10-30 ENCOUNTER — OFFICE VISIT (OUTPATIENT)
Dept: PHYSICAL THERAPY | Facility: CLINIC | Age: 53
End: 2018-10-30
Payer: COMMERCIAL

## 2018-10-30 DIAGNOSIS — M75.41 IMPINGEMENT SYNDROME OF RIGHT SHOULDER: ICD-10-CM

## 2018-10-30 DIAGNOSIS — S43.431D SUPERIOR GLENOID LABRUM LESION OF RIGHT SHOULDER, SUBSEQUENT ENCOUNTER: ICD-10-CM

## 2018-10-30 DIAGNOSIS — Z47.89 AFTERCARE FOLLOWING SURGERY OF THE MUSCULOSKELETAL SYSTEM: Primary | ICD-10-CM

## 2018-10-30 DIAGNOSIS — M25.511 CHRONIC RIGHT SHOULDER PAIN: ICD-10-CM

## 2018-10-30 DIAGNOSIS — G89.29 CHRONIC RIGHT SHOULDER PAIN: ICD-10-CM

## 2018-10-30 DIAGNOSIS — M25.511 RIGHT SHOULDER PAIN, UNSPECIFIED CHRONICITY: ICD-10-CM

## 2018-10-30 PROCEDURE — 97112 NEUROMUSCULAR REEDUCATION: CPT

## 2018-10-30 PROCEDURE — 97140 MANUAL THERAPY 1/> REGIONS: CPT

## 2018-10-30 PROCEDURE — 97110 THERAPEUTIC EXERCISES: CPT

## 2018-10-30 NOTE — PROGRESS NOTES
Daily Note     Today's date: 10/30/2018  Patient name: Whitney Ross  : 1965  MRN: [id number]  Referring provider: Harjit Ibrahim PA-C  Dx:   Encounter Diagnosis     ICD-10-CM    1  Aftercare following surgery of the musculoskeletal system Z47 89    2  Chronic right shoulder pain M25 511     G89 29    3  Superior glenoid labrum lesion of right shoulder, subsequent encounter S43 431D    4  Right shoulder pain, unspecified chronicity M25 511    5  Impingement syndrome of right shoulder M75 41                   Subjective: Pt reports he saw MD yesterday  He states that MD was happy with ROM  Objective: See treatment diary below      Assessment: Addition of DB for RTC strengthening  Patient presented with no pain or discomfort while performing strengthening TE  Flexion and ER limitations remain  Plan: Continue per plan of care  Progress treatment as tolerated        Precautions: HTN, Right SLAP and posterior labrum repair   (protocol attached to chart and scanned into media      Daily Treatment Diary      Manual  10/9 10/11 10/19 10/23 10/26 10/30  9/18  9/21  9/25  10/2   Shoulder flexion PROM within protocol limits flexion as tolerated TK JK JK 5' 5' 5'  TK  JK  TK TK   Scaption PROM  TK  JK  JK 3' 4' 2' TK  JK  TK  TK   ER PROM TK  JK JK 2' 5' 3'  TK [facility]  TK TK                                                         Exercise Diary  10/9 10/11 10/19 10/23 10/26 10/30 9/18  9/21  9/25  10/2   Right wrist flexion/extension 20x D/C       20x2  20x2  20x2  20x2   Right finger flexion/extension 20x d/c       20x2  20x2  20x2 20x2   Right finder abd/add 20x        20x2  20x2  20x2 20x2   Right elbow PROM flexion/extension 10x        20x2 20x2  20x2  20x2   Codman pendulums  15x 15x 2x10     20x2  20x2  20x2 20x2    Supine cane flexion AAROM  2 x 10  2x10  2x10  2x10 2x10 2x10       2 x 10    Supine cane ER AAROM  2 x 10  2x10  2x10  2x10 2x10 2x10       2 x 10    FRO  2 x 10  2x10  2x10  2x10 2x10 2x10       2 x 10    Table flexion  pball roll out on table   green ball 2 x 10  green 2x10  green 2x10           green ball 2 x 10    Tband ER/IR isometric walkout  RTB x10  RTB 10x   RTB 10x  RTB 10x RTB 2x10  RTB 2x10       NV    Pulley's 5 min  5 min   5 min 5 min 5 min  5 min            Lawn chair flexion  Small wedge 2 x 10  small wedge 2x10   small wedge 2x10  small wedge 2x10 Lg wedge 2x10  lg wedge 1# 2x10            sidelying ER to neutral  2 x 10  2x10  2x10  2x10 3x10  2x10, 1# x10            Ball up wall AAROM flexion  Yellow ball 2 x 10  orange 2x10  orange 2x10  orange 2x10 orange 2x10  orange 2x10           standing cane flexion NV  2x10  2x10  2x10  AROM 2x10  2x10           Tband rows     RTB 2 x 10  RTB 2x10  RTB 3x10  GTB 2x10            FTW        foam roll 2x10 Foam roll 2x10  foam roll 2x10            hitch hikers         2x10  2x10  1# 2x10           Prone I T      2x10 1# 2x10

## 2018-11-02 ENCOUNTER — OFFICE VISIT (OUTPATIENT)
Dept: PHYSICAL THERAPY | Facility: CLINIC | Age: 53
End: 2018-11-02
Payer: COMMERCIAL

## 2018-11-02 DIAGNOSIS — M75.41 IMPINGEMENT SYNDROME OF RIGHT SHOULDER: ICD-10-CM

## 2018-11-02 DIAGNOSIS — M25.511 RIGHT SHOULDER PAIN, UNSPECIFIED CHRONICITY: ICD-10-CM

## 2018-11-02 DIAGNOSIS — Z47.89 AFTERCARE FOLLOWING SURGERY OF THE MUSCULOSKELETAL SYSTEM: Primary | ICD-10-CM

## 2018-11-02 DIAGNOSIS — S43.431D SUPERIOR GLENOID LABRUM LESION OF RIGHT SHOULDER, SUBSEQUENT ENCOUNTER: ICD-10-CM

## 2018-11-02 DIAGNOSIS — G89.29 CHRONIC RIGHT SHOULDER PAIN: ICD-10-CM

## 2018-11-02 DIAGNOSIS — M25.511 CHRONIC RIGHT SHOULDER PAIN: ICD-10-CM

## 2018-11-02 PROCEDURE — 97140 MANUAL THERAPY 1/> REGIONS: CPT

## 2018-11-02 PROCEDURE — 97112 NEUROMUSCULAR REEDUCATION: CPT

## 2018-11-02 PROCEDURE — 97110 THERAPEUTIC EXERCISES: CPT

## 2018-11-02 NOTE — PROGRESS NOTES
Daily Note     Today's date: 2018  Patient name: Todd Mchugh  : 1965  MRN: [id number]  Referring provider: Laz Mata PA-C  Dx:   Encounter Diagnosis     ICD-10-CM    1  Aftercare following surgery of the musculoskeletal system Z47 89    2  Chronic right shoulder pain M25 511     G89 29    3  Superior glenoid labrum lesion of right shoulder, subsequent encounter S43 431D    4  Right shoulder pain, unspecified chronicity M25 511    5  Impingement syndrome of right shoulder M75 41               7:30- 8:10 1:1 CF     Subjective: Pt states he has good and bad days, today he is okay with little to no pain with movement  Objective: See treatment diary below      Assessment: Pt progressed through exercises well with no increase of pain and min to moderate fatigue near end of session  Pt demonstrates overall good passive ROM per protocl as he is progressing as he should  Plan: Continue per plan of care  Progress treatment as tolerated        Precautions: HTN, Right SLAP and posterior labrum repair   (protocol attached to chart and scanned into media      Daily Treatment Diary      Manual  10/9 10/11 10/19 10/23 10/26 10/30 11/2  9/21  9/25  10   Shoulder flexion PROM within protocol limits flexion as tolerated TK JK JK 5' 5' 5' [facility]  JK  TK TK   Scaption PROM  TK  JK  JK 3' 4' 2' [facility]  JK  TK  TK   ER PROM TK  JK JK 2' 5' 3' [facility]  JK  TK TK                                                       Exercise Diary  10/9 10/11 10/19 10/23 10/26 10/30 11/2  9/21  9/25  102   Right wrist flexion/extension 20x D/C         20x2  20x2  20x2   Right finger flexion/extension 20x d/c         20x2  20x2 20x2   Right finder abd/add 20x         20x2  20x2 20x2   Right elbow PROM flexion/extension 10x        20x2  20x2  20x2   Codman pendulums  15x 15x 2x10      20x2  20x2 20x2    Supine cane flexion AAROM  2 x 10  2x10  2x10  2x10 2x10 2x10 2 x10      2 x 10    Supine cane ER AAROM  2 x 10  2x10  2x10  2x10 2x10 2x10 2 x10      2 x 10    FRO  2 x 10  2x10  2x10  2x10 2x10 2x10 2 x10      2 x 10    Table flexion  pball roll out on table   green ball 2 x 10  green 2x10  green 2x10    np       green ball 2 x 10    Tband ER/IR isometric walkout  RTB x10  RTB 10x   RTB 10x  RTB 10x RTB 2x10  RTB 2x10 RTB   2 x10      NV    Pulley's 5 min  5 min   5 min 5 min 5 min  5 min 5 mins          Lawn chair flexion  Small wedge 2 x 10  small wedge 2x10   small wedge 2x10  small wedge 2x10 Lg wedge 2x10  lg wedge 1# 2x10 lg wedge 1# 2x10          sidelying ER to neutral  2 x 10  2x10  2x10  2x10 3x10  2x10, 1# x10 2 x10  1#           Ball up wall AAROM flexion  Yellow ball 2 x 10  orange 2x10  orange 2x10  orange 2x10 orange 2x10  orange 2x10 Or 2 x10          standing cane flexion NV  2x10  2x10  2x10  AROM 2x10  2x10 2 x10          Tband rows     RTB 2 x 10  RTB 2x10  RTB 3x10  GTB 2x10 GTB  2 x10           FTW        foam roll 2x10 Foam roll 2x10  foam roll 2x10 Foam   Roll   2 x10           hitch hikers         2x10  2x10  1# 2x10 1# 2 x10          Prone I T      2x10 1# 2x10 1#   2 x10

## 2018-11-06 ENCOUNTER — OFFICE VISIT (OUTPATIENT)
Dept: PHYSICAL THERAPY | Facility: CLINIC | Age: 53
End: 2018-11-06
Payer: COMMERCIAL

## 2018-11-06 DIAGNOSIS — G89.29 CHRONIC RIGHT SHOULDER PAIN: ICD-10-CM

## 2018-11-06 DIAGNOSIS — S43.431D SUPERIOR GLENOID LABRUM LESION OF RIGHT SHOULDER, SUBSEQUENT ENCOUNTER: ICD-10-CM

## 2018-11-06 DIAGNOSIS — Z47.89 AFTERCARE FOLLOWING SURGERY OF THE MUSCULOSKELETAL SYSTEM: Primary | ICD-10-CM

## 2018-11-06 DIAGNOSIS — M75.41 IMPINGEMENT SYNDROME OF RIGHT SHOULDER: ICD-10-CM

## 2018-11-06 DIAGNOSIS — M25.511 CHRONIC RIGHT SHOULDER PAIN: ICD-10-CM

## 2018-11-06 DIAGNOSIS — M25.511 RIGHT SHOULDER PAIN, UNSPECIFIED CHRONICITY: ICD-10-CM

## 2018-11-06 PROCEDURE — 97140 MANUAL THERAPY 1/> REGIONS: CPT

## 2018-11-06 PROCEDURE — 97110 THERAPEUTIC EXERCISES: CPT

## 2018-11-06 PROCEDURE — 97112 NEUROMUSCULAR REEDUCATION: CPT

## 2018-11-06 NOTE — PROGRESS NOTES
Daily Note     Today's date: 2018  Patient name: Luke Pyle  : 1965  MRN: [id number]  Referring provider: Angie Ibarra PA-C  Dx:   Encounter Diagnosis     ICD-10-CM    1  Aftercare following surgery of the musculoskeletal system Z47 89    2  Chronic right shoulder pain M25 511     G89 29    3  Superior glenoid labrum lesion of right shoulder, subsequent encounter S43 431D    4  Right shoulder pain, unspecified chronicity M25 511    5  Impingement syndrome of right shoulder M75 41                   Subjective: Patient reports R shoulder is "A little sore" upon arrival to session        Objective: See treatment diary below   Precautions: HTN, Right SLAP and posterior labrum repair   (protocol attached to chart and scanned into media      Daily Treatment Diary      Manual  10/9 10/11 10/19 10/23 10/26 10/30 11/2 11/6  9/25  10/2   Shoulder flexion PROM within protocol limits flexion as tolerated TK JK JK 5' 5' 5' CF 5'  TK TK   Scaption PROM  TK  JK  JK 3' 4' 2' CF 5'  TK  TK   ER PROM TK  JK JK 2' 5' 3' CF 4'  TK TK                                                     Exercise Diary  10/9 10/11 10/19 10/23 10/26 10/30 11/2 11/6  9/25  102   Right wrist flexion/extension 20x D/C          20x2  20x2   Right finger flexion/extension 20x d/c          20x2 20x2   Right finder abd/add 20x          20x2 20x2   Right elbow PROM flexion/extension 10x          20x2  20x2   Codman pendulums  15x 15x 2x10       20x2 20x2    Supine cane flexion AAROM  2 x 10  2x10  2x10  2x10 2x10 2x10 2 x10  1/2 # 2x20   2 x 10    Supine cane ER AAROM  2 x 10  2x10  2x10  2x10 2x10 2x10 2 x10  1/2 # 2x10   2 x 10    FRO  2 x 10  2x10  2x10  2x10 2x10 2x10 2 x10  2x10   2 x 10    Table flexion  pball roll out on table   green ball 2 x 10  green 2x10  green 2x10    np      green ball 2 x 10    Tband ER/IR isometric walkout  RTB x10  RTB 10x   RTB 10x  RTB 10x RTB 2x10  RTB 2x10 RTB   2 x10  isotonic RTB 2x10   NV    Pulley's 5 min  5 min   5 min 5 min 5 min  5 min 5 mins 5 min        Lawn chair flexion  Small wedge 2 x 10  small wedge 2x10   small wedge 2x10  small wedge 2x10 Lg wedge 2x10  lg wedge 1# 2x10 lg wedge 1# 2x10 standing AROM        sidelying ER to neutral  2 x 10  2x10  2x10  2x10 3x10  2x10, 1# x10 2 x10  1#  1# 3x10        Ball up wall AAROM flexion  Yellow ball 2 x 10  orange 2x10  orange 2x10  orange 2x10 orange 2x10  orange 2x10 Or 2 x10  OPB 2x10       standing cane flexion NV  2x10  2x10  2x10  AROM 2x10  2x10 2 x10  1/2# 2x10       Tband rows     RTB 2 x 10  RTB 2x10  RTB 3x10  GTB 2x10 GTB  2 x10  GTB 2x10 (& ext)        FTW        foam roll 2x10 Foam roll 2x10  foam roll 2x10 Foam   Roll   2 x10  Foam roll 2x10        hitch hikers         2x10  2x10  1# 2x10 1# 2 x10  1# 2x10       Prone I T      2x10 1# 2x10 1#   2 x10  1# 2x10 & Y     Wall ball circles        Red x20 cw/ccw     SL ABD        1# 2x10         Assessment: Patient continues to progress well with strengthening  All exercises were performed pain free  AROM flexion against gravity is WFL  PROM ER limitations present  Verbal cuing provided in order to facilitate proper TE form and dosage  Plan: Continue per plan of care

## 2018-11-09 ENCOUNTER — OFFICE VISIT (OUTPATIENT)
Dept: PHYSICAL THERAPY | Facility: CLINIC | Age: 53
End: 2018-11-09
Payer: COMMERCIAL

## 2018-11-09 DIAGNOSIS — M75.41 IMPINGEMENT SYNDROME OF RIGHT SHOULDER: ICD-10-CM

## 2018-11-09 DIAGNOSIS — S43.431D SUPERIOR GLENOID LABRUM LESION OF RIGHT SHOULDER, SUBSEQUENT ENCOUNTER: ICD-10-CM

## 2018-11-09 DIAGNOSIS — Z47.89 AFTERCARE FOLLOWING SURGERY OF THE MUSCULOSKELETAL SYSTEM: Primary | ICD-10-CM

## 2018-11-09 DIAGNOSIS — G89.29 CHRONIC RIGHT SHOULDER PAIN: ICD-10-CM

## 2018-11-09 DIAGNOSIS — M25.511 RIGHT SHOULDER PAIN, UNSPECIFIED CHRONICITY: ICD-10-CM

## 2018-11-09 DIAGNOSIS — M25.511 CHRONIC RIGHT SHOULDER PAIN: ICD-10-CM

## 2018-11-09 PROCEDURE — 97140 MANUAL THERAPY 1/> REGIONS: CPT

## 2018-11-09 PROCEDURE — 97112 NEUROMUSCULAR REEDUCATION: CPT

## 2018-11-09 PROCEDURE — 97110 THERAPEUTIC EXERCISES: CPT

## 2018-11-09 NOTE — PROGRESS NOTES
Daily Note     Today's date: 2018  Patient name: Reny Ahuja  : 1965  MRN: [id number]  Referring provider: Lilia Diamond PA-C  Dx:   Encounter Diagnosis     ICD-10-CM    1  Aftercare following surgery of the musculoskeletal system Z47 89    2  Chronic right shoulder pain M25 511     G89 29    3  Superior glenoid labrum lesion of right shoulder, subsequent encounter S43 431D    4  Right shoulder pain, unspecified chronicity M25 511    5  Impingement syndrome of right shoulder M75 41               8:15- 8:55 1:1 CF    Subjective: Pt states he is feeling better today then he did last session as he is not as sore         Objective: See treatment diary below   Precautions: HTN, Right SLAP and posterior labrum repair   (protocol attached to chart and scanned into media      Daily Treatment Diary      Manual  10/9 10/11 10/19 10/23 10/26 10/30 11/2 11/6 11/9  10   Shoulder flexion PROM within protocol limits flexion as tolerated TK JK JK 5' 5' 5' CF 5' CF TK   Scaption PROM  TK  JK  JK 3' 4' 2' CF 5' CF  TK   ER PROM TK  JK JK 2' 5' 3' CF 4' Cf TK                                                   Exercise Diary  10/9 10/11 10/19 10/23 10/26 10/30 11/2 11/6 11/9  10   Right wrist flexion/extension 20x D/C           20x2   Right finger flexion/extension 20x d/c          20x2   Right finder abd/add 20x          20x2   Right elbow PROM flexion/extension 10x           20x2   Codman pendulums  15x 15x 2x10       20x2    Supine cane flexion AAROM  2 x 10  2x10  2x10  2x10 2x10 2x10 2 x10  1/2 # 2x20 1/2 #   2 x10  2 x 10    Supine cane ER AAROM  2 x 10  2x10  2x10  2x10 2x10 2x10 2 x10  1/2 # 2x10 1/2 #   2 x10  2 x 10    FRO  2 x 10  2x10  2x10  2x10 2x10 2x10 2 x10  2x10  2 x 10    Table flexion  pball roll out on table   green ball 2 x 10  green 2x10  green 2x10    np     green ball 2 x 10    Tband ER/IR isometric walkout  RTB x10  RTB 10x   RTB 10x  RTB 10x RTB 2x10  RTB 2x10 RTB   2 x10  isotonic RTB 2x10 2 x10  NV    Pulley's 5 min  5 min   5 min 5 min 5 min  5 min 5 mins 5 min 5 mins      Lawn chair flexion  Small wedge 2 x 10  small wedge 2x10   small wedge 2x10  small wedge 2x10 Lg wedge 2x10  lg wedge 1# 2x10 lg wedge 1# 2x10 standing AROM  npP      sidelying ER to neutral  2 x 10  2x10  2x10  2x10 3x10  2x10, 1# x10 2 x10  1#  1# 3x10 1# 3 x10       Ball up wall AAROM flexion  Yellow ball 2 x 10  orange 2x10  orange 2x10  orange 2x10 orange 2x10  orange 2x10 Or 2 x10  OPB 2x10 OPB 2 x20      standing cane flexion NV  2x10  2x10  2x10  AROM 2x10  2x10 2 x10  1/2# 2x10 1/2 # 2 x10      Tband rows     RTB 2 x 10  RTB 2x10  RTB 3x10  GTB 2x10 GTB  2 x10  GTB 2x10 (& ext) GTB  x20      FTW        foam roll 2x10 Foam roll 2x10  foam roll 2x10 Foam   Roll   2 x10  Foam roll 2x10 Foam roll   2 x10       hitch hikers         2x10  2x10  1# 2x10 1# 2 x10  1# 2x10 1# 2 x10      Prone I T      2x10 1# 2x10 1#   2 x10  1# 2x10 & Y 1# 2 x10 ea    Wall ball circles        Red x20 cw/ccw Read   X 20 ea    SL ABD        1# 2x10 1# 2 x10         Assessment: Pt progressed through exerices well as he contines to be limited by protocal  Pt has over all good passive ROM Pt demonstrates good scap control seen with tband exercises  Pt had less pain with pulleys today  Plan: Continue per plan of care

## 2018-11-13 ENCOUNTER — OFFICE VISIT (OUTPATIENT)
Dept: PHYSICAL THERAPY | Facility: CLINIC | Age: 53
End: 2018-11-13
Payer: COMMERCIAL

## 2018-11-13 DIAGNOSIS — G89.29 CHRONIC RIGHT SHOULDER PAIN: ICD-10-CM

## 2018-11-13 DIAGNOSIS — S43.431D SUPERIOR GLENOID LABRUM LESION OF RIGHT SHOULDER, SUBSEQUENT ENCOUNTER: ICD-10-CM

## 2018-11-13 DIAGNOSIS — M75.41 IMPINGEMENT SYNDROME OF RIGHT SHOULDER: ICD-10-CM

## 2018-11-13 DIAGNOSIS — M25.511 RIGHT SHOULDER PAIN, UNSPECIFIED CHRONICITY: ICD-10-CM

## 2018-11-13 DIAGNOSIS — Z47.89 AFTERCARE FOLLOWING SURGERY OF THE MUSCULOSKELETAL SYSTEM: Primary | ICD-10-CM

## 2018-11-13 DIAGNOSIS — M25.511 CHRONIC RIGHT SHOULDER PAIN: ICD-10-CM

## 2018-11-13 PROCEDURE — 97140 MANUAL THERAPY 1/> REGIONS: CPT | Performed by: PHYSICAL THERAPIST

## 2018-11-13 PROCEDURE — 97110 THERAPEUTIC EXERCISES: CPT | Performed by: PHYSICAL THERAPIST

## 2018-11-13 PROCEDURE — 97112 NEUROMUSCULAR REEDUCATION: CPT | Performed by: PHYSICAL THERAPIST

## 2018-11-13 NOTE — PROGRESS NOTES
Daily Note     Today's date: 2018  Patient name: Wang Horton  : 1965  MRN: [id number]  Referring provider: Sangita Lu PA-C  Dx:   Encounter Diagnosis     ICD-10-CM    1  Aftercare following surgery of the musculoskeletal system Z47 89    2  Chronic right shoulder pain M25 511     G89 29    3  Superior glenoid labrum lesion of right shoulder, subsequent encounter S43 431D    4  Right shoulder pain, unspecified chronicity M25 511    5  Impingement syndrome of right shoulder M75 41                   Subjective: Pt notes that he is doing well, getting better and better  He is most limited with reaching forward to grasp objects from approximately 60 degrees and then higher at this time  Objective: See treatment diary below      Assessment: Tolerated treatment well  Patient demonstrated fatigue post treatment, would benefit from continued PT and was able to progress well with body blade  He will be 12 weeks post op after his next visit, therefore will conitnue to progerss with strengthening to tolerance  Plan: Continue per plan of care  Progress treatment as tolerated         Precautions: HTN, Right SLAP and posterior labrum repair   (protocol attached to chart and scanned into media      Daily Treatment Diary      Manual  10/9 10/11 10/19 10/23 10/26 10/30 11/2 11/6 11/9 11/13   Shoulder flexion PROM within protocol limits flexion as tolerated TK JK JK 5' 5' 5' CF 5' CF 3'   Scaption PROM  TK  JK  JK 3' 4' 2' CF 5' CF 3'   ER PROM TK  JK JK 2' 5' 3' CF 4' Cf 4'                                                   Exercise Diary  10/9 10/11 10/19 10/23 10/26 10/30 11/2 11/6 11/9 11/13   Right wrist flexion/extension 20x D/C              Right finger flexion/extension 20x d/c             Right finder abd/add 20x             UBE NV              Body blade          30"x3 ea    Supine cane flexion AAROM  2 x 10  2x10  2x10  2x10 2x10 2x10 2 x10  1/2 # 2x20 1/2 #   2 x10  2 x 10 1#    Supine cane ER AAROM  2 x 10  2x10  2x10  2x10 2x10 2x10 2 x10  1/2 # 2x10 1/2 #   2 x10  2 x 10 1#    FRO  2 x 10  2x10  2x10  2x10 2x10 2x10 2 x10  2x10  2 x 10    Table flexion  pball roll out on table   green ball 2 x 10  green 2x10  green 2x10    np        Tband ER/IR isometric walkout  RTB x10  RTB 10x   RTB 10x  RTB 10x RTB 2x10  RTB 2x10 RTB   2 x10  isotonic RTB 2x10 2 x10      Pulley's 5 min  5 min   5 min 5 min 5 min  5 min 5 mins 5 min 5 mins  5'    Lawn chair flexion  Small wedge 2 x 10  small wedge 2x10   small wedge 2x10  small wedge 2x10 Lg wedge 2x10  lg wedge 1# 2x10 lg wedge 1# 2x10 standing AROM  npP      sidelying ER to neutral  2 x 10  2x10  2x10  2x10 3x10  2x10, 1# x10 2 x10  1#  1# 3x10 1# 3 x10   2# 30x    Ball up wall AAROM flexion  Yellow ball 2 x 10  orange 2x10  orange 2x10  orange 2x10 orange 2x10  orange 2x10 Or 2 x10  OPB 2x10 OPB 2 x20   2x20   standing cane flexion NV  2x10  2x10  2x10  AROM 2x10  2x10 2 x10  1/2# 2x10 1/2 # 2 x10   1# 20x   Tband rows     RTB 2 x 10  RTB 2x10  RTB 3x10  GTB 2x10 GTB  2 x10  GTB 2x10 (& ext) GTB  x20  GTB 20x    FTW        foam roll 2x10 Foam roll 2x10  foam roll 2x10 Foam   Roll   2 x10  Foam roll 2x10 Foam roll   2 x10   foam roll 20x    hitch hikers         2x10  2x10  1# 2x10 1# 2 x10  1# 2x10 1# 2 x10   1# 20x   Prone I T      2x10 1# 2x10 1#   2 x10  1# 2x10 & Y 1# 2 x10 ea 1# 2x10   Wall ball circles        Red x20 cw/ccw Read   X 20 ea Red 20x ea    SL ABD        1# 2x10 1# 2 x10  1# 2x10

## 2018-11-16 ENCOUNTER — APPOINTMENT (OUTPATIENT)
Dept: PHYSICAL THERAPY | Facility: CLINIC | Age: 53
End: 2018-11-16
Payer: COMMERCIAL

## 2018-11-20 ENCOUNTER — TELEPHONE (OUTPATIENT)
Dept: UROLOGY | Facility: AMBULATORY SURGERY CENTER | Age: 53
End: 2018-11-20

## 2018-11-20 ENCOUNTER — APPOINTMENT (OUTPATIENT)
Dept: PHYSICAL THERAPY | Facility: CLINIC | Age: 53
End: 2018-11-20
Payer: COMMERCIAL

## 2018-11-20 NOTE — TELEPHONE ENCOUNTER
Reason for appointment/Complaint/Diagnosis : kidney stones  Insurance: [facility]  History of Cancer? no          If yes, what kind? n/a  Previous urologist?   no           Records requested/where? epic    Outside testing/where? no  Location Preference for office visit? [email or web address]

## 2018-11-21 ENCOUNTER — OFFICE VISIT (OUTPATIENT)
Dept: UROLOGY | Facility: MEDICAL CENTER | Age: 53
End: 2018-11-21
Payer: COMMERCIAL

## 2018-11-21 VITALS
SYSTOLIC BLOOD PRESSURE: 130 MMHG | DIASTOLIC BLOOD PRESSURE: 84 MMHG | WEIGHT: 196 LBS | HEIGHT: 67 IN | BODY MASS INDEX: 30.76 KG/M2

## 2018-11-21 DIAGNOSIS — Z87.442 HISTORY OF NEPHROLITHIASIS: Primary | ICD-10-CM

## 2018-11-21 DIAGNOSIS — N20.0 CALCULUS OF KIDNEY: ICD-10-CM

## 2018-11-21 LAB
SL AMB  POCT GLUCOSE, UA: ABNORMAL
SL AMB LEUKOCYTE ESTERASE,UA: ABNORMAL
SL AMB POCT BILIRUBIN,UA: ABNORMAL
SL AMB POCT BLOOD,UA: ABNORMAL
SL AMB POCT CLARITY,UA: CLEAR
SL AMB POCT COLOR,UA: YELLOW
SL AMB POCT KETONES,UA: ABNORMAL
SL AMB POCT NITRITE,UA: ABNORMAL
SL AMB POCT PH,UA: 5.5
SL AMB POCT SPECIFIC GRAVITY,UA: 1.03
SL AMB POCT URINE PROTEIN: ABNORMAL
SL AMB POCT UROBILINOGEN: 0.2

## 2018-11-21 PROCEDURE — 81003 URINALYSIS AUTO W/O SCOPE: CPT | Performed by: UROLOGY

## 2018-11-21 PROCEDURE — 82360 CALCULUS ASSAY QUANT: CPT | Performed by: UROLOGY

## 2018-11-21 PROCEDURE — 99244 OFF/OP CNSLTJ NEW/EST MOD 40: CPT | Performed by: UROLOGY

## 2018-11-21 NOTE — LETTER
November 21, 2018     Sherice Martin DO  [address] [place] [address] Close    Patient: Luciano Sweet   YOB: 1965   Date of Visit: 11/21/2018       Dear Dr Asher Munoz:    Thank you for referring Luciano Sweet to me for evaluation  Below are my notes for this consultation  If you have questions, please do not hesitate to call me  I look forward to following your patient along with you           Sincerely,        Denise Driver MD        CC: No Recipients

## 2018-11-21 NOTE — PROGRESS NOTES
Assessment/Plan:      Diagnoses and all orders for this visit:    History of nephrolithiasis    Calculus of kidney  Comments:  Right  Orders:  -     Stone analysis          Subjective:  No current complaints     Patient ID: Leah Fernandez is a 48 y o  male  HPI  History of nephrolithiasis over the past few years where he has had a few episodes of spontaneous passage of small stones  The last episode was a few weeks ago and he was able to retrieve a stone fragment  He denies any dysuria, current suprapubic or flank pains  He states that he has a normal appetite and bowel function  He denies any fevers, chills, gross hematuria, or difficulty urinating  Review of Systems   Constitutional: Negative  HENT: Negative  Eyes: Negative  Respiratory: Negative  Cardiovascular: Negative  Gastrointestinal: Negative  Endocrine: Negative  Genitourinary: Positive for flank pain  Skin: Negative  Allergic/Immunologic: Negative  Neurological: Negative  Hematological: Negative  Psychiatric/Behavioral: Negative  Objective:     Physical Exam   Constitutional: He is oriented to person, place, and time  He appears well-developed and well-nourished  No distress  HENT:   Head: Normocephalic and atraumatic  Nose: Nose normal    Mouth/Throat: Oropharynx is clear and moist    Eyes: Pupils are equal, round, and reactive to light  Conjunctivae and EOM are normal  No scleral icterus  Neck: Normal range of motion  Neck supple  Cardiovascular: Normal rate, regular rhythm, normal heart sounds and intact distal pulses  No murmur heard  Pulmonary/Chest: Effort normal and breath sounds normal  No respiratory distress  He has no wheezes  He has no rales  Abdominal: Soft  Bowel sounds are normal  He exhibits no distension and no mass  There is no tenderness  Musculoskeletal: Normal range of motion  He exhibits no edema or tenderness  Lymphadenopathy:     He has no cervical adenopathy  Neurological: He is alert and oriented to person, place, and time  No cranial nerve deficit  Skin: Skin is warm and dry  No rash noted  No erythema  No pallor  Psychiatric: He has a normal mood and affect  His behavior is normal  Judgment and thought content normal    Nursing note and vitals reviewed  PSA from 03/07/2018 is 0 6    IMPRESSION:  1   2-3 mm calculus within the distal right ureter with associated mild right hydronephrosis and hydroureter  2   Additional 4 mm nonobstructive calculus within the right kidney  3   Under distended bladder demonstrates a thickened wall, correlate with urinalysis to exclude infection  4   The prostate is enlarged  5   Diverticulosis without evidence of diverticulitis    6   Trace bilateral hydroceles

## 2018-11-23 ENCOUNTER — OFFICE VISIT (OUTPATIENT)
Dept: PHYSICAL THERAPY | Facility: CLINIC | Age: 53
End: 2018-11-23
Payer: COMMERCIAL

## 2018-11-23 DIAGNOSIS — G89.29 CHRONIC RIGHT SHOULDER PAIN: ICD-10-CM

## 2018-11-23 DIAGNOSIS — M25.511 CHRONIC RIGHT SHOULDER PAIN: ICD-10-CM

## 2018-11-23 DIAGNOSIS — M75.41 IMPINGEMENT SYNDROME OF RIGHT SHOULDER: ICD-10-CM

## 2018-11-23 DIAGNOSIS — M25.511 RIGHT SHOULDER PAIN, UNSPECIFIED CHRONICITY: ICD-10-CM

## 2018-11-23 DIAGNOSIS — S43.431D SUPERIOR GLENOID LABRUM LESION OF RIGHT SHOULDER, SUBSEQUENT ENCOUNTER: ICD-10-CM

## 2018-11-23 DIAGNOSIS — Z47.89 AFTERCARE FOLLOWING SURGERY OF THE MUSCULOSKELETAL SYSTEM: Primary | ICD-10-CM

## 2018-11-23 PROCEDURE — 97112 NEUROMUSCULAR REEDUCATION: CPT

## 2018-11-23 PROCEDURE — G8990 OTHER PT/OT CURRENT STATUS: HCPCS

## 2018-11-23 PROCEDURE — G8991 OTHER PT/OT GOAL STATUS: HCPCS

## 2018-11-23 PROCEDURE — 97140 MANUAL THERAPY 1/> REGIONS: CPT

## 2018-11-23 PROCEDURE — 97110 THERAPEUTIC EXERCISES: CPT

## 2018-11-23 NOTE — PROGRESS NOTES
Daily Note     Today's date: 2018  Patient name: Yovani Fountain  : 1965  MRN: [id number]  Referring provider: Altagracia Thorpe PA-C  Dx:   Encounter Diagnosis     ICD-10-CM    1  Aftercare following surgery of the musculoskeletal system Z47 89    2  Chronic right shoulder pain M25 511     G89 29    3  Superior glenoid labrum lesion of right shoulder, subsequent encounter S43 431D    4  Right shoulder pain, unspecified chronicity M25 511    5  Impingement syndrome of right shoulder M75 41                   Subjective: Patient reports difficulty with reaching overhead on high shelves secondary to lack of ROM        Objective: See treatment diary below  Precautions: HTN, Right SLAP and posterior labrum repair   (protocol attached to chart and scanned into media      Daily Treatment Diary      Manual  11/23 10/11 10/19 10/23 10/26 10/30 11/2 11/6 11/9 11/13   Shoulder flexion PROM within protocol limits flexion as tolerated 3' JK JK 5' 5' 5' CF 5' CF 3'   Scaption PROM 4' [address] 3' 4' 2' CF 5' CF 3'   ER PROM 3' [facility] JK 2' 5' 3' CF 4' Cf 4'                                                 Exercise Diary  11/23 10/11 10/19 10/23 10/26 10/30 11/2 11/6 11/9 11/13   Right wrist flexion/extension               Right finger flexion/extension              Right finder abd/add              UBE               Body blade          30"x3 ea    Supine cane flexion AAROM 2# 2x10  2x10  2x10  2x10 2x10 2x10 2 x10  1/2 # 2x20 1/2 #   2 x10  2 x 10 1#    Supine cane ER AAROM 2# 2x10  2x10  2x10  2x10 2x10 2x10 2 x10  1/2 # 2x10 1/2 #   2 x10  2 x 10 1#    FRO 2x10  2x10  2x10  2x10 2x10 2x10 2 x10  2x10  2 x 10    Table flexion  pball roll out on table    green 2x10  green 2x10    np        Tband ER/IR isometric walkout isotonic 2x10 rtb  RTB 10x   RTB 10x  RTB 10x RTB 2x10  RTB 2x10 RTB   2 x10  isotonic RTB 2x10 2 x10      Pulley's 5 min  5 min   5 min 5 min 5 min  5 min 5 mins 5 min 5 mins  5'    Lawn chair flexion  small wedge 2x10   small wedge 2x10  small wedge 2x10 Lg wedge 2x10  lg wedge 1# 2x10 lg wedge 1# 2x10 standing AROM  npP      sidelying ER to neutral 2# x30  2x10  2x10  2x10 3x10  2x10, 1# x10 2 x10  1#  1# 3x10 1# 3 x10   2# 30x    Ball up wall AAROM flexion  2x20  orange 2x10  orange 2x10  orange 2x10 orange 2x10  orange 2x10 Or 2 x10  OPB 2x10 OPB 2 x20   2x20   standing cane flexion 2# x30  2x10  2x10  2x10  AROM 2x10  2x10 2 x10  1/2# 2x10 1/2 # 2 x10   1# 20x   Tband rows GTB x20   RTB 2 x 10  RTB 2x10  RTB 3x10  GTB 2x10 GTB  2 x10  GTB 2x10 (& ext) GTB  x20  GTB 20x    FTW Foam roll x20      foam roll 2x10 Foam roll 2x10  foam roll 2x10 Foam   Roll   2 x10  Foam roll 2x10 Foam roll   2 x10   foam roll 20x    hitch hikers  1# x30      2x10  2x10  1# 2x10 1# 2 x10  1# 2x10 1# 2 x10   1# 20x   Prone I T  1# 3x10    2x10 1# 2x10 1#   2 x10  1# 2x10 & Y 1# 2 x10 ea 1# 2x10   Wall ball circles Red x20 ea       Red x20 cw/ccw Read   X 20 ea Red 20x ea    SL ABD 1# 2x10       1# 2x10 1# 2 x10  1# 2x10                    Assessment: Patient presenting with limitations in ER during PROM  Pt able to complete all prescribed TE without c/o discomfort or pain in R shoulder  Patient would benefit from continued PT in order to improve R [facility] ROM and strength for improved reaching function  Plan: Continue per plan of care

## 2018-11-27 ENCOUNTER — OFFICE VISIT (OUTPATIENT)
Dept: PHYSICAL THERAPY | Facility: CLINIC | Age: 53
End: 2018-11-27
Payer: COMMERCIAL

## 2018-11-27 DIAGNOSIS — Z47.89 AFTERCARE FOLLOWING SURGERY OF THE MUSCULOSKELETAL SYSTEM: Primary | ICD-10-CM

## 2018-11-27 DIAGNOSIS — S43.431D SUPERIOR GLENOID LABRUM LESION OF RIGHT SHOULDER, SUBSEQUENT ENCOUNTER: ICD-10-CM

## 2018-11-27 DIAGNOSIS — M25.511 RIGHT SHOULDER PAIN, UNSPECIFIED CHRONICITY: ICD-10-CM

## 2018-11-27 DIAGNOSIS — M75.41 IMPINGEMENT SYNDROME OF RIGHT SHOULDER: ICD-10-CM

## 2018-11-27 DIAGNOSIS — M25.511 CHRONIC RIGHT SHOULDER PAIN: ICD-10-CM

## 2018-11-27 DIAGNOSIS — G89.29 CHRONIC RIGHT SHOULDER PAIN: ICD-10-CM

## 2018-11-27 PROCEDURE — 97110 THERAPEUTIC EXERCISES: CPT

## 2018-11-27 PROCEDURE — 97140 MANUAL THERAPY 1/> REGIONS: CPT

## 2018-11-27 PROCEDURE — 97112 NEUROMUSCULAR REEDUCATION: CPT

## 2018-11-27 NOTE — PROGRESS NOTES
Daily Note     Today's date: 2018  Patient name: Alina Kelley  : 1965  MRN: [id number]  Referring provider: Nathalie Escamilla PA-C  Dx:   Encounter Diagnosis     ICD-10-CM    1  Aftercare following surgery of the musculoskeletal system Z47 89    2  Chronic right shoulder pain M25 511     G89 29    3  Superior glenoid labrum lesion of right shoulder, subsequent encounter S43 431D    4  Right shoulder pain, unspecified chronicity M25 511    5  Impingement syndrome of right shoulder M75 41                   Subjective: Patient offers no complaints regarding R shoulder upon arrival to session        Objective: See treatment diary below  Precautions: HTN, Right SLAP and posterior labrum repair   (protocol attached to chart and scanned into media      Daily Treatment Diary      Manual  11/23 11/27 10/19 10/23 10/26 10/30 11/2 11/6 11/9 11/13   Shoulder flexion PROM within protocol limits flexion as tolerated 3' 4' JK 5' 5' 5' CF 5' CF 3'   Scaption PROM 4' 3' [place] 3' 4' 2' CF 5' CF 3'   ER PROM 3' 3' JK 2' 5' 3' CF 4' Cf 4'                                               Exercise Diary  11/23 11/27 10/19 10/23 10/26 10/30 11/2 11/6 11/9 11/13   Right wrist flexion/extension              Right finger flexion/extension             Right finder abd/add             UBE              Body blade  30"x3 ea        30"x3 ea    Supine cane flexion AAROM 2# 2x10 2# 2x10  2x10  2x10 2x10 2x10 2 x10  1/2 # 2x20 1/2 #   2 x10  2 x 10 1#    Supine cane ER AAROM 2# 2x10 2# 2x10  2x10  2x10 2x10 2x10 2 x10  1/2 # 2x10 1/2 #   2 x10  2 x 10 1#    FRO 2x10 2x10  2x10  2x10 2x10 2x10 2 x10  2x10  2 x 10    Table flexion  pball roll out on table     green 2x10    np        Tband ER/IR isometric walkout isotonic 2x10 rtb Isotonic 2x10 rtb   RTB 10x  RTB 10x RTB 2x10  RTB 2x10 RTB   2 x10  isotonic RTB 2x10 2 x10      Pulley's 5 min 5 min   5 min 5 min 5 min  5 min 5 mins 5 min 5 mins  5'    Lawn chair flexion     small wedge 2x10  small wedge 2x10 Lg wedge 2x10  lg wedge 1# 2x10 lg wedge 1# 2x10 standing AROM  npP      sidelying ER to neutral 2# x30 2# x30  2x10  2x10 3x10  2x10, 1# x10 2 x10  1#  1# 3x10 1# 3 x10   2# 30x    Ball up wall AAROM flexion  2x20 2x20  orange 2x10  orange 2x10 orange 2x10  orange 2x10 Or 2 x10  OPB 2x10 OPB 2 x20   2x20   standing cane flexion 2# x30 2# x20 (DB)  2x10  2x10  AROM 2x10  2x10 2 x10  1/2# 2x10 1/2 # 2 x10   1# 20x   Tband rows GTB x20 GTB x20 RTB 2 x 10  RTB 2x10  RTB 3x10  GTB 2x10 GTB  2 x10  GTB 2x10 (& ext) GTB  x20  GTB 20x    FTW Foam roll x20 Foam roll x20    foam roll 2x10 Foam roll 2x10  foam roll 2x10 Foam   Roll   2 x10  Foam roll 2x10 Foam roll   2 x10   foam roll 20x    hitch hikers  1# x30 2# x20    2x10  2x10  1# 2x10 1# 2 x10  1# 2x10 1# 2 x10   1# 20x   Prone I T  1# 3x10 & Y 2# 2x10   2x10 1# 2x10 1#   2 x10  1# 2x10 & Y 1# 2 x10 ea 1# 2x10   Wall ball circles Red x20 ea Red x30 ea      Red x20 cw/ccw Read   X 20 ea Red 20x ea    SL ABD 1# 2x10 2# 2x10      1# 2x10 1# 2 x10  1# 2x10                    Assessment: Weakness present in periscapular region, as pt demonstrating difficulty performing prone scap stabilization TE with prescribed resistance  Presents with decreased R shoulder endurance throughout  No pain or discomfort with prescribed exercises  Co-treat with GM, PT [id number]    Plan: Continue per plan of care

## 2018-11-30 ENCOUNTER — OFFICE VISIT (OUTPATIENT)
Dept: PHYSICAL THERAPY | Facility: CLINIC | Age: 53
End: 2018-11-30
Payer: COMMERCIAL

## 2018-11-30 DIAGNOSIS — M25.511 RIGHT SHOULDER PAIN, UNSPECIFIED CHRONICITY: ICD-10-CM

## 2018-11-30 DIAGNOSIS — S43.431D SUPERIOR GLENOID LABRUM LESION OF RIGHT SHOULDER, SUBSEQUENT ENCOUNTER: ICD-10-CM

## 2018-11-30 DIAGNOSIS — Z47.89 AFTERCARE FOLLOWING SURGERY OF THE MUSCULOSKELETAL SYSTEM: Primary | ICD-10-CM

## 2018-11-30 DIAGNOSIS — G89.29 CHRONIC RIGHT SHOULDER PAIN: ICD-10-CM

## 2018-11-30 DIAGNOSIS — M25.511 CHRONIC RIGHT SHOULDER PAIN: ICD-10-CM

## 2018-11-30 DIAGNOSIS — M75.41 IMPINGEMENT SYNDROME OF RIGHT SHOULDER: ICD-10-CM

## 2018-11-30 PROCEDURE — 97140 MANUAL THERAPY 1/> REGIONS: CPT

## 2018-11-30 PROCEDURE — 97110 THERAPEUTIC EXERCISES: CPT

## 2018-11-30 PROCEDURE — 97112 NEUROMUSCULAR REEDUCATION: CPT

## 2018-11-30 NOTE — PROGRESS NOTES
Daily Note     Today's date: 2018  Patient name: Luciano Sweet  : 1965  MRN: [id number]  Referring provider: Jonathon Hermosillo PA-C  Dx:   Encounter Diagnosis     ICD-10-CM    1  Aftercare following surgery of the musculoskeletal system Z47 89    2  Chronic right shoulder pain M25 511     G89 29    3  Superior glenoid labrum lesion of right shoulder, subsequent encounter S43 431D    4  Right shoulder pain, unspecified chronicity M25 511    5  Impingement syndrome of right shoulder M75 41                   Subjective: Patient reports he has "very little pain" upon arrival to session        Objective: See treatment diary below  Precautions: HTN, Right SLAP and posterior labrum repair   (protocol attached to chart and scanned into media      Daily Treatment Diary      Manual  11/23 11/27 11/30 10/23 10/26 10/30 11/2 11/6 11/9 11/13   Shoulder flexion PROM within protocol limits flexion as tolerated 3' 4' 4' 5' 5' 5' CF 5' CF 3'   Scaption PROM 4' 3' 2' 3' 4' 2' CF 5' CF 3'   ER PROM 3' 3' 4' 2' 5' 3' CF 4' Cf 4'                                             Exercise Diary  11/23 11/27 11/30 10/23 10/26 10/30 11/2 11/6 11/9 11/13   Right wrist flexion/extension              Right finger flexion/extension             Right finder abd/add             UBE              Body blade  30"x3 ea 30"x3       30"x3 ea    Supine cane flexion AAROM 2# 2x10 2# 2x10 2# 2x10  2x10 2x10 2x10 2 x10  1/2 # 2x20 1/2 #   2 x10  2 x 10 1#    Supine cane ER AAROM 2# 2x10 2# 2x10 2# 2x10  2x10 2x10 2x10 2 x10  1/2 # 2x10 1/2 #   2 x10  2 x 10 1#    FRO 2x10 2x10 3x10  2x10 2x10 2x10 2 x10  2x10  2 x 10    Table flexion  pball roll out on table        np        Tband ER/IR isometric walkout isotonic 2x10 rtb Isotonic 2x10 rtb isotonic 3x10 RTB  RTB 10x RTB 2x10  RTB 2x10 RTB   2 x10  isotonic RTB 2x10 2 x10      Pulley's 5 min 5 min 5 min 5 min 5 min  5 min 5 mins 5 min 5 mins  5'    Lawn chair flexion     small wedge 2x10 Lg wedge 2x10  lg wedge 1# 2x10 lg wedge 1# 2x10 standing AROM  npP      sidelying ER to neutral 2# x30 2# x30 3# x20  2x10 3x10  2x10, 1# x10 2 x10  1#  1# 3x10 1# 3 x10   2# 30x    Ball up wall AAROM flexion  2x20 2x20   orange 2x10 orange 2x10  orange 2x10 Or 2 x10  OPB 2x10 OPB 2 x20   2x20   standing cane flexion 2# x30 2# x20 (DB) 2# x20 (DB)  2x10  AROM 2x10  2x10 2 x10  1/2# 2x10 1/2 # 2 x10   1# 20x   Tband rows GTB x20 GTB x20 GTB x30  RTB 2x10  RTB 3x10  GTB 2x10 GTB  2 x10  GTB 2x10 (& ext) GTB  x20  GTB 20x    FTW Foam roll x20 Foam roll x20 Foam roll x30  foam roll 2x10 Foam roll 2x10  foam roll 2x10 Foam   Roll   2 x10  Foam roll 2x10 Foam roll   2 x10   foam roll 20x    hitch hikers  1# x30 2# x20 2# x20  2x10  2x10  1# 2x10 1# 2 x10  1# 2x10 1# 2 x10   1# 20x   Prone I T  1# 3x10 & Y 2# 2x10 & Y 2# 2x10  2x10 1# 2x10 1#   2 x10  1# 2x10 & Y 1# 2 x10 ea 1# 2x10   Wall ball circles Red x20 ea Red x30 ea Red x30 ea     Red x20 cw/ccw Read   X 20 ea Red 20x ea    SL ABD 1# 2x10 2# 2x10 2# 2x10     1# 2x10 1# 2 x10  1# 2x10                    Assessment: Weakness in periscapular region present, as pt is challenged with charted resistance for scap stabilization TE  PROM is pain free  Patient would benefit from continued PT in order to improve periscapular strength and R shoulder strength for improved function for ADLs  Plan: Continue per plan of care

## 2018-12-03 LAB
CA PHOS MFR STONE: 5 %
CALCIUM OXALATE DIHYDRATE MFR STONE IR: 10 %
COLOR STONE: NORMAL
COM MFR STONE: 85 %
COMMENT-STONE3: NORMAL
COMPOSITION: NORMAL
LABORATORY COMMENT REPORT: NORMAL
NIDUS STONE QL: NORMAL
PHOTO: NORMAL
SIZE STONE: NORMAL MM
STONE ANALYSIS-IMP: NORMAL
WT STONE: 4.4 MG

## 2018-12-04 ENCOUNTER — OFFICE VISIT (OUTPATIENT)
Dept: PHYSICAL THERAPY | Facility: CLINIC | Age: 53
End: 2018-12-04
Payer: COMMERCIAL

## 2018-12-04 DIAGNOSIS — M25.511 CHRONIC RIGHT SHOULDER PAIN: ICD-10-CM

## 2018-12-04 DIAGNOSIS — G89.29 CHRONIC RIGHT SHOULDER PAIN: ICD-10-CM

## 2018-12-04 DIAGNOSIS — M75.41 IMPINGEMENT SYNDROME OF RIGHT SHOULDER: ICD-10-CM

## 2018-12-04 DIAGNOSIS — M25.511 RIGHT SHOULDER PAIN, UNSPECIFIED CHRONICITY: ICD-10-CM

## 2018-12-04 DIAGNOSIS — Z47.89 AFTERCARE FOLLOWING SURGERY OF THE MUSCULOSKELETAL SYSTEM: Primary | ICD-10-CM

## 2018-12-04 DIAGNOSIS — S43.431D SUPERIOR GLENOID LABRUM LESION OF RIGHT SHOULDER, SUBSEQUENT ENCOUNTER: ICD-10-CM

## 2018-12-04 PROCEDURE — 97140 MANUAL THERAPY 1/> REGIONS: CPT

## 2018-12-04 PROCEDURE — 97112 NEUROMUSCULAR REEDUCATION: CPT

## 2018-12-04 PROCEDURE — 97110 THERAPEUTIC EXERCISES: CPT

## 2018-12-04 NOTE — PROGRESS NOTES
Daily Note     Today's date: 2018  Patient name: Wood Correia  : 1965  MRN: [id number]  Referring provider: Mauricio Hassan PA-C  Dx:   Encounter Diagnosis     ICD-10-CM    1  Aftercare following surgery of the musculoskeletal system Z47 89    2  Chronic right shoulder pain M25 511     G89 29    3  Superior glenoid labrum lesion of right shoulder, subsequent encounter S43 431D    4  Right shoulder pain, unspecified chronicity M25 511    5  Impingement syndrome of right shoulder M75 41                   Subjective: Patient reports he was performing yard work over the weekend  Patient states he did not have any pain while completing yard work, however felt muscle soreness the next day        Objective: See treatment diary below  Precautions: HTN, Right SLAP and posterior labrum repair   (protocol attached to chart and scanned into media      Daily Treatment Diary      Manual  11/23 11/27 11/30 12/4 10/26 10/30 11/2 11/6 11/9 11/13   Shoulder flexion PROM within protocol limits flexion as tolerated 3' 4' 4' 5' 5' 5' CF 5' CF 3'   Scaption PROM 4' 3' 2' 2' 4' 2' CF 5' CF 3'   ER PROM 3' 3' 4' 3' 5' 3' CF 4' Cf 4'                                           Exercise Diary  11/23 11/27 11/30 12/4 10/26 10/30 11/2 11/6 11/9 11/13   Right wrist flexion/extension              Right finger flexion/extension             Right finder abd/add             UBE    2'f/2'b          Body blade  30"x3 ea 30"x3 30"x3      30"x3 ea    Supine cane flexion AAROM 2# 2x10 2# 2x10 2# 2x10 2# 3x10 2x10 2x10 2 x10  1/2 # 2x20 1/2 #   2 x10  2 x 10 1#    Supine cane ER AAROM 2# 2x10 2# 2x10 2# 2x10 2# 3x10 2x10 2x10 2 x10  1/2 # 2x10 1/2 #   2 x10  2 x 10 1#    FRO 2x10 2x10 3x10 3x10 2x10 2x10 2 x10  2x10  2 x 10    Table flexion  pball roll out on table        np        Tband ER/IR isometric walkout isotonic 2x10 rtb Isotonic 2x10 rtb isotonic 3x10 RTB isotonic 3x10 RTB RTB 2x10  RTB 2x10 RTB   2 x10  isotonic RTB 2x10 2 x10  Pulley's 5 min 5 min 5 min 5 min 5 min  5 min 5 mins 5 min 5 mins  5'    Lawn chair flexion     Lg wedge 2x10  lg wedge 1# 2x10 lg wedge 1# 2x10 standing AROM  npP      sidelying ER to neutral 2# x30 2# x30 3# x20 3# x20 3x10  2x10, 1# x10 2 x10  1#  1# 3x10 1# 3 x10   2# 30x    Ball up wall AAROM flexion  2x20 2x20   orange 2x10  orange 2x10 Or 2 x10  OPB 2x10 OPB 2 x20   2x20   standing cane flexion 2# x30 2# x20 (DB) 2# x20 (DB) 2# x20 (DB)  AROM 2x10  2x10 2 x10  1/2# 2x10 1/2 # 2 x10   1# 20x   Tband rows GTB x20 GTB x20 GTB x30 BTB x20  RTB 3x10  GTB 2x10 GTB  2 x10  GTB 2x10 (& ext) GTB  x20  GTB 20x    FTW Foam roll x20 Foam roll x20 Foam roll x30 Foam roll x30 Foam roll 2x10  foam roll 2x10 Foam   Roll   2 x10  Foam roll 2x10 Foam roll   2 x10   foam roll 20x    hitch hikers  1# x30 2# x20 2# x20 2# x20  2x10  1# 2x10 1# 2 x10  1# 2x10 1# 2 x10   1# 20x   Prone I T  1# 3x10 & Y 2# 2x10 & Y 2# 2x10 & Y 2# 2x10 2x10 1# 2x10 1#   2 x10  1# 2x10 & Y 1# 2 x10 ea 1# 2x10   Wall ball circles Red x20 ea Red x30 ea Red x30 ea Red x30 ea    Red x20 cw/ccw Read   X 20 ea Red 20x ea    SL ABD 1# 2x10 2# 2x10 2# 2x10 3# 2x10    1# 2x10 1# 2 x10  1# 2x10                    Assessment: Patient progressing well with strengthening  No reports of R shoulder discomfort or pain  Patient is challenged with scap stabilization exercises secondary to decreased endurance and weakness  Patient would benefit from continued PT in order to improve overall periscapular strength and endurance  Plan: Continue per plan of care

## 2018-12-07 ENCOUNTER — OFFICE VISIT (OUTPATIENT)
Dept: PHYSICAL THERAPY | Facility: CLINIC | Age: 53
End: 2018-12-07
Payer: COMMERCIAL

## 2018-12-07 DIAGNOSIS — M25.511 RIGHT SHOULDER PAIN, UNSPECIFIED CHRONICITY: ICD-10-CM

## 2018-12-07 DIAGNOSIS — S43.431D SUPERIOR GLENOID LABRUM LESION OF RIGHT SHOULDER, SUBSEQUENT ENCOUNTER: ICD-10-CM

## 2018-12-07 DIAGNOSIS — Z47.89 AFTERCARE FOLLOWING SURGERY OF THE MUSCULOSKELETAL SYSTEM: Primary | ICD-10-CM

## 2018-12-07 DIAGNOSIS — G89.29 CHRONIC RIGHT SHOULDER PAIN: ICD-10-CM

## 2018-12-07 DIAGNOSIS — M25.511 CHRONIC RIGHT SHOULDER PAIN: ICD-10-CM

## 2018-12-07 DIAGNOSIS — M75.41 IMPINGEMENT SYNDROME OF RIGHT SHOULDER: ICD-10-CM

## 2018-12-07 PROCEDURE — 97112 NEUROMUSCULAR REEDUCATION: CPT

## 2018-12-07 PROCEDURE — 97110 THERAPEUTIC EXERCISES: CPT

## 2018-12-07 PROCEDURE — 97140 MANUAL THERAPY 1/> REGIONS: CPT

## 2018-12-07 NOTE — PROGRESS NOTES
Daily Note     Today's date: 2018  Patient name: Enrique Lopez  : 1965  MRN: [id number]  Referring provider: Speedy Vasquez PA-C  Dx:   Encounter Diagnosis     ICD-10-CM    1  Aftercare following surgery of the musculoskeletal system Z47 89    2  Chronic right shoulder pain M25 511     G89 29    3  Superior glenoid labrum lesion of right shoulder, subsequent encounter S43 431D    4  Right shoulder pain, unspecified chronicity M25 511    5  Impingement syndrome of right shoulder M75 41        Start Time: 730  Stop Time: 830  Total time in clinic (min): 60 minutes    Subjective: Patient noted no increased soreness from the UBE after last session  Patient noted no pain at the start of the session         Objective: See treatment diary below    Precautions: HTN, Right SLAP and posterior labrum repair   (protocol attached to chart and scanned into media      Daily Treatment Diary      Manual  11/23 11/27 11/30 12/4 12/6 10/30 11/2 11/6 11/9 11/13   Shoulder flexion PROM within protocol limits flexion as tolerated 3' 4' 4' 5' 5' 5' CF 5' CF 3'   Scaption PROM 4' 3' 2' 2' 2' 2' CF 5' CF 3'   ER PROM 3' 3' 4' 3' 3' 3' CF 4' Cf 4'                                           Exercise Diary  11/23 11/27 11/30 12/4 12/7 10/30 11/2 11/6 11/9 11/13   Right wrist flexion/extension              Right finger flexion/extension             Right finder abd/add             UBE    2'f/2'b 3'f/3'b         Body blade  30"x3 ea 30"x3 30"x3 30"x3     30"x3 ea    Supine cane flexion AAROM 2# 2x10 2# 2x10 2# 2x10 2# 3x10 2#  3x10 2x10 2 x10  1/2 # 2x20 1/2 #   2 x10  2 x 10 1#    Supine cane ER AAROM 2# 2x10 2# 2x10 2# 2x10 2# 3x10 2#  3x10 2x10 2 x10  1/2 # 2x10 1/2 #   2 x10  2 x 10 1#    FRO 2x10 2x10 3x10 3x10 3x10 2x10 2 x10  2x10  2 x 10    Table flexion  pball roll out on table        np        Tband ER/IR isometric walkout isotonic 2x10 rtb Isotonic 2x10 rtb isotonic 3x10 RTB isotonic 3x10 RTB RTB 2x10  RTB 2x10 RTB 2 x10  isotonic RTB 2x10 2 x10      Pulley's 5 min 5 min 5 min 5 min 5 min  5 min 5 mins 5 min 5 mins  5'    Lawn chair flexion       lg wedge 1# 2x10 lg wedge 1# 2x10 standing AROM  npP      sidelying ER to neutral 2# x30 2# x30 3# x20 3# x20 3# 3x10  2x10, 1# x10 2 x10  1#  1# 3x10 1# 3 x10   2# 30x    Ball up wall AAROM flexion  2x20 2x20     orange 2x10 Or 2 x10  OPB 2x10 OPB 2 x20   2x20   standing cane flexion 2# x30 2# x20 (DB) 2# x20 (DB) 2# x20 (DB)  2# x20 (DB)  2x10 2 x10  1/2# 2x10 1/2 # 2 x10   1# 20x   Tband rows GTB x20 GTB x20 GTB x30 BTB x20 BTB  x20  GTB 2x10 GTB  2 x10  GTB 2x10 (& ext) GTB  x20  GTB 20x    FTW Foam roll x20 Foam roll x20 Foam roll x30 Foam roll x30 Foam roll 2x10  foam roll 2x10 Foam   Roll   2 x10  Foam roll 2x10 Foam roll   2 x10   foam roll 20x    hitch hikers  1# x30 2# x20 2# x20 2# x20  2x10 2#  1# 2x10 1# 2 x10  1# 2x10 1# 2 x10   1# 20x   Prone I T  1# 3x10 & Y 2# 2x10 & Y 2# 2x10 & Y 2# 2x10 2x10  2#  &Y 1# 2x10 1#   2 x10  1# 2x10 & Y 1# 2 x10 ea 1# 2x10   Wall ball circles Red x20 ea Red x30 ea Red x30 ea Red x30 ea Red  x30 ea   Red x20 cw/ccw Read   X 20 ea Red 20x ea    SL ABD 1# 2x10 2# 2x10 2# 2x10 3# 2x10 3#  2x10   1# 2x10 1# 2 x10  1# 2x10                        Assessment: Patient performed exercises with increased reps 2* improvements in strength and stability  Patient required min VCs and 1 break during sidelying ER and Prone T 2* decreased muscular endurance  Patient would benefit from continued PT to allow the patient to return to his PLOF  Plan: Continue per plan of care

## 2018-12-11 ENCOUNTER — OFFICE VISIT (OUTPATIENT)
Dept: PHYSICAL THERAPY | Facility: CLINIC | Age: 53
End: 2018-12-11
Payer: COMMERCIAL

## 2018-12-11 DIAGNOSIS — S43.431D SUPERIOR GLENOID LABRUM LESION OF RIGHT SHOULDER, SUBSEQUENT ENCOUNTER: ICD-10-CM

## 2018-12-11 DIAGNOSIS — M75.41 IMPINGEMENT SYNDROME OF RIGHT SHOULDER: ICD-10-CM

## 2018-12-11 DIAGNOSIS — G89.29 CHRONIC RIGHT SHOULDER PAIN: ICD-10-CM

## 2018-12-11 DIAGNOSIS — M25.511 CHRONIC RIGHT SHOULDER PAIN: ICD-10-CM

## 2018-12-11 DIAGNOSIS — M25.511 RIGHT SHOULDER PAIN, UNSPECIFIED CHRONICITY: ICD-10-CM

## 2018-12-11 DIAGNOSIS — Z47.89 AFTERCARE FOLLOWING SURGERY OF THE MUSCULOSKELETAL SYSTEM: Primary | ICD-10-CM

## 2018-12-11 PROCEDURE — 97140 MANUAL THERAPY 1/> REGIONS: CPT

## 2018-12-11 PROCEDURE — 97112 NEUROMUSCULAR REEDUCATION: CPT

## 2018-12-11 PROCEDURE — 97110 THERAPEUTIC EXERCISES: CPT

## 2018-12-11 NOTE — PROGRESS NOTES
Daily Note     Today's date: 2018  Patient name: Alina Kelley  : 1965  MRN: [id number]  Referring provider: Nathalie Escamilla PA-C  Dx:   Encounter Diagnosis     ICD-10-CM    1  Aftercare following surgery of the musculoskeletal system Z47 89    2  Chronic right shoulder pain M25 511     G89 29    3  Superior glenoid labrum lesion of right shoulder, subsequent encounter S43 431D    4  Right shoulder pain, unspecified chronicity M25 511    5  Impingement syndrome of right shoulder M75 41               7:30 - 8:08 1:1 CF 8:08 0 8:15 IEP     Subjective: Pt reports 2/10 pain in his right shoulder  Pt states over all things have been getting better as he notices things are getting easier around the house         Objective: See treatment diary below    Precautions: HTN, Right SLAP and posterior labrum repair   (protocol attached to chart and scanned into media      Daily Treatment Diary      Manual     Shoulder flexion PROM within protocol limits flexion as tolerated 3' 4' 4' 5' 5' CF CF 5' CF 3'   Scaption PROM 4' 3' 2' 2' 2' CF CF 5' CF 3'   ER PROM 3' 3' 4' 3' 3' CF CF 4' Cf 4'                                         Exercise Diary     Right wrist flexion/extension              Right finger flexion/extension             Right finder abd/add             UBE    2'f/2'b 3'f/3'b 3'/3'        Body blade  30"x3 ea 30"x3 30"x3 30"x3 30" x 3    30"x3 ea    Supine cane flexion AAROM 2# 2x10 2# 2x10 2# 2x10 2# 3x10 2#  3x10 2# 3 x10 2 x10  1/2 # 2x20 1/2 #   2 x10  2 x 10 1#    Supine cane ER AAROM 2# 2x10 2# 2x10 2# 2x10 2# 3x10 2#  3x10 2# 3 x10  2 x10  1/2 # 2x10 1/2 #   2 x10  2 x 10 1#    FRO 2x10 2x10 3x10 3x10 3x10 3 x10 2 x10  2x10  2 x 10    Table flexion  pball roll out on table        np        Tband ER/IR isometric walkout isotonic 2x10 rtb Isotonic 2x10 rtb isotonic 3x10 RTB isotonic 3x10 RTB RTB 2x10 RTB   2 x10  RTB   2 x10  isotonic RTB 2x10 2 x10      Pulley's 5 min 5 min 5 min 5 min 5 min 5 mins  5 mins 5 min 5 mins  5'    Lawn chair flexion      -- lg wedge 1# 2x10 standing AROM  npP      sidelying ER to neutral 2# x30 2# x30 3# x20 3# x20 3# 3x10 3# 3 x10  2 x10  1#  1# 3x10 1# 3 x10   2# 30x    Ball up wall AAROM flexion  2x20 2x20    -- Or 2 x10  OPB 2x10 OPB 2 x20   2x20   standing cane flexion 2# x30 2# x20 (DB) 2# x20 (DB) 2# x20 (DB)  2# x20 (DB) 2# x 20  2 x10  1/2# 2x10 1/2 # 2 x10   1# 20x   Tband rows GTB x20 GTB x20 GTB x30 BTB x20 BTB  x20 BTB   X 20  GTB  2 x10  GTB 2x10 (& ext) GTB  x20  GTB 20x    FTW Foam roll x20 Foam roll x20 Foam roll x30 Foam roll x30 Foam roll 2x10 Foam roller   X 30  Foam   Roll   2 x10  Foam roll 2x10 Foam roll   2 x10   foam roll 20x    hitch hikers  1# x30 2# x20 2# x20 2# x20  2x10 2# 2# 2 x10  1# 2 x10  1# 2x10 1# 2 x10   1# 20x   Prone I T  1# 3x10 & Y 2# 2x10 & Y 2# 2x10 & Y 2# 2x10 2x10  2#  &Y 2 x10 2#  1#   2 x10  1# 2x10 & Y 1# 2 x10 ea 1# 2x10   Wall ball circles Red x20 ea Red x30 ea Red x30 ea Red x30 ea Red  x30 ea Red x 30   Red x20 cw/ccw Read   X 20 ea Red 20x ea    SL ABD 1# 2x10 2# 2x10 2# 2x10 3# 2x10 3#  2x10 3# 3 x10   1# 2x10 1# 2 x10  1# 2x10                        Assessment: Pt demonstrates good should stability as he is able to tolerate body blade well with less fatigue  Pt demonstrates good passive ROM seen with manual therapy  Pt demonstrates good scapular retraction seen with tband exercises  Plan: Continue per plan of care

## 2018-12-14 ENCOUNTER — OFFICE VISIT (OUTPATIENT)
Dept: PHYSICAL THERAPY | Facility: CLINIC | Age: 53
End: 2018-12-14
Payer: COMMERCIAL

## 2018-12-14 DIAGNOSIS — M25.511 CHRONIC RIGHT SHOULDER PAIN: ICD-10-CM

## 2018-12-14 DIAGNOSIS — G89.29 CHRONIC RIGHT SHOULDER PAIN: ICD-10-CM

## 2018-12-14 DIAGNOSIS — M75.41 IMPINGEMENT SYNDROME OF RIGHT SHOULDER: ICD-10-CM

## 2018-12-14 DIAGNOSIS — M25.511 RIGHT SHOULDER PAIN, UNSPECIFIED CHRONICITY: ICD-10-CM

## 2018-12-14 DIAGNOSIS — Z47.89 AFTERCARE FOLLOWING SURGERY OF THE MUSCULOSKELETAL SYSTEM: Primary | ICD-10-CM

## 2018-12-14 DIAGNOSIS — S43.431D SUPERIOR GLENOID LABRUM LESION OF RIGHT SHOULDER, SUBSEQUENT ENCOUNTER: ICD-10-CM

## 2018-12-14 PROCEDURE — 97110 THERAPEUTIC EXERCISES: CPT

## 2018-12-14 PROCEDURE — 97112 NEUROMUSCULAR REEDUCATION: CPT

## 2018-12-14 PROCEDURE — 97140 MANUAL THERAPY 1/> REGIONS: CPT

## 2018-12-14 NOTE — PROGRESS NOTES
Daily Note     Today's date: 2018  Patient name: Aravind Deluca  : 1965  MRN: [id number]  Referring provider: Diomedes Moran PA-C  Dx:   Encounter Diagnosis     ICD-10-CM    1  Aftercare following surgery of the musculoskeletal system Z47 89    2  Chronic right shoulder pain M25 511     G89 29    3  Superior glenoid labrum lesion of right shoulder, subsequent encounter S43 431D    4  Right shoulder pain, unspecified chronicity M25 511    5  Impingement syndrome of right shoulder M75 41                   Subjective: Patient states he has difficulty bringing sugar jar down from cabinet        Objective: See treatment diary below  Precautions: HTN, Right SLAP and posterior labrum repair   (protocol attached to chart and scanned into media      Daily Treatment Diary      Manual     Shoulder flexion PROM within protocol limits flexion as tolerated 3' 4' 4' 5' 5' CF 5' 5' CF 3'   Scaption PROM 4' 3' 2' 2' 2' CF 2' 5' CF 3'   ER PROM 3' 3' 4' 3' 3' CF 3' 4' Cf 4'                                         Exercise Diary     Right wrist flexion/extension              Right finger flexion/extension             Right finder abd/add             UBE    2'f/2'b 3'f/3'b 3'/3' 3'f/3'b       Body blade  30"x3 ea 30"x3 30"x3 30"x3 30" x 3 30"x3   30"x3 ea    Supine cane flexion AAROM 2# 2x10 2# 2x10 2# 2x10 2# 3x10 2#  3x10 2# 3 x10 2# 3x10 1/2 # 2x20 1/2 #   2 x10  2 x 10 1#    Supine cane ER AAROM 2# 2x10 2# 2x10 2# 2x10 2# 3x10 2#  3x10 2# 3 x10  2# 3x10 1/2 # 2x10 1/2 #   2 x10  2 x 10 1#    FRO 2x10 2x10 3x10 3x10 3x10 3 x10 3x10 2x10  2 x 10    Table flexion  pball roll out on table               Tband ER/IR isometric walkout isotonic 2x10 rtb Isotonic 2x10 rtb isotonic 3x10 RTB isotonic 3x10 RTB RTB 2x10 RTB   2 x10  GTB 2x10 isotonic RTB 2x10 2 x10      Pulley's 5 min 5 min 5 min 5 min 5 min 5 mins  5 min 5 min 5 mins  5'    Lawn chair flexion      --  standing AROM  npP      sidelying ER to neutral 2# x30 2# x30 3# x20 3# x20 3# 3x10 3# 3 x10  3# 3x10 1# 3x10 1# 3 x10   2# 30x    Ball up wall AAROM flexion  2x20 2x20    --  OPB 2x10 OPB 2 x20   2x20   standing cane flexion 2# x30 2# x20 (DB) 2# x20 (DB) 2# x20 (DB)  2# x20 (DB) 2# x 20  3# x20 1/2# 2x10 1/2 # 2 x10   1# 20x   Tband rows GTB x20 GTB x20 GTB x30 BTB x20 BTB  x20 BTB   X 20  btb x20 GTB 2x10 (& ext) GTB  x20  GTB 20x    FTW Foam roll x20 Foam roll x20 Foam roll x30 Foam roll x30 Foam roll 2x10 Foam roller   X 30  Foam roll x30 Foam roll 2x10 Foam roll   2 x10   foam roll 20x    hitch hikers  1# x30 2# x20 2# x20 2# x20  2x10 2# 2# 2 x10  3# 2x10 1# 2x10 1# 2 x10   1# 20x   Prone I T  1# 3x10 & Y 2# 2x10 & Y 2# 2x10 & Y 2# 2x10 2x10  2#  &Y 2 x10 2#  2# 3x10 & Y 1# 2x10 & Y 1# 2 x10 ea 1# 2x10   Wall ball circles Red x20 ea Red x30 ea Red x30 ea Red x30 ea Red  x30 ea Red x 30  Red x30 Red x20 cw/ccw Read   X 20 ea Red 20x ea    SL ABD 1# 2x10 2# 2x10 2# 2x10 3# 2x10 3#  2x10 3# 3 x10  3# 3x10 1# 2x10 1# 2 x10  1# 2x10   Eccentric lowering       1/2 # x10            Assessment: Patient was able to complete eccentric lowering without c/o discomfort or pain in R shoulder  Able to progress resistance and reps, as charted, without discomfort or pain  Pt would benefit from continued PT in order to improve overall RUE strength  Plan: Continue per plan of care

## 2018-12-18 ENCOUNTER — OFFICE VISIT (OUTPATIENT)
Dept: PHYSICAL THERAPY | Facility: CLINIC | Age: 53
End: 2018-12-18
Payer: COMMERCIAL

## 2018-12-18 DIAGNOSIS — M25.511 RIGHT SHOULDER PAIN, UNSPECIFIED CHRONICITY: ICD-10-CM

## 2018-12-18 DIAGNOSIS — G89.29 CHRONIC RIGHT SHOULDER PAIN: Primary | ICD-10-CM

## 2018-12-18 DIAGNOSIS — M25.511 CHRONIC RIGHT SHOULDER PAIN: Primary | ICD-10-CM

## 2018-12-18 DIAGNOSIS — M75.41 IMPINGEMENT SYNDROME OF RIGHT SHOULDER: ICD-10-CM

## 2018-12-18 DIAGNOSIS — S43.431D SUPERIOR GLENOID LABRUM LESION OF RIGHT SHOULDER, SUBSEQUENT ENCOUNTER: ICD-10-CM

## 2018-12-18 DIAGNOSIS — Z47.89 AFTERCARE FOLLOWING SURGERY OF THE MUSCULOSKELETAL SYSTEM: ICD-10-CM

## 2018-12-18 PROCEDURE — 97110 THERAPEUTIC EXERCISES: CPT

## 2018-12-18 PROCEDURE — G8990 OTHER PT/OT CURRENT STATUS: HCPCS

## 2018-12-18 PROCEDURE — G8991 OTHER PT/OT GOAL STATUS: HCPCS

## 2018-12-18 PROCEDURE — 97140 MANUAL THERAPY 1/> REGIONS: CPT

## 2018-12-18 PROCEDURE — 97112 NEUROMUSCULAR REEDUCATION: CPT

## 2018-12-18 NOTE — PROGRESS NOTES
Daily Note     Today's date: 2018  Patient name: Hanh Rodney  : 1965  MRN: [id number]  Referring provider: Derrick Coe PA-C  Dx:   Encounter Diagnosis     ICD-10-CM    1  Chronic right shoulder pain M25 511     G89 29    2  Aftercare following surgery of the musculoskeletal system Z47 89    3  Superior glenoid labrum lesion of right shoulder, subsequent encounter S43 431D    4  Right shoulder pain, unspecified chronicity M25 511    5  Impingement syndrome of right shoulder M75 41                   Subjective: Pt states he punched a heavy bag over the weekend and experienced increased soreness at anterior R shoulder  Pt reports pain is subsiding        Objective: See treatment diary below    Precautions: HTN, Right SLAP and posterior labrum repair   (protocol attached to chart and scanned into media      Daily Treatment Diary      Manual     Shoulder flexion PROM within protocol limits flexion as tolerated 3' 4' 4' 5' 5' CF 5' 5' CF 3'   Scaption PROM 4' 3' 2' 2' 2' CF 2' 2' CF 3'   ER PROM 3' 3' 4' 3' 3' CF 3' 3' Cf 4'                                         Exercise Diary     Right wrist flexion/extension              Right finger flexion/extension             Right finder abd/add             UBE    2'f/2'b 3'f/3'b 3'/3' 3'f/3'b 3'f/3'b      Body blade  30"x3 ea 30"x3 30"x3 30"x3 30" x 3 30"x3 30"x3  30"x3 ea    Supine cane flexion AAROM 2# 2x10 2# 2x10 2# 2x10 2# 3x10 2#  3x10 2# 3 x10 2# 3x10 2# 3x10 1/2 #   2 x10  2 x 10 1#    Supine cane ER AAROM 2# 2x10 2# 2x10 2# 2x10 2# 3x10 2#  3x10 2# 3 x10  2# 3x10 2# 3x10 1/2 #   2 x10  2 x 10 1#    FRO 2x10 2x10 3x10 3x10 3x10 3 x10 3x10 3x10  2 x 10    Table flexion  pball roll out on table               Tband ER/IR isometric walkout isotonic 2x10 rtb Isotonic 2x10 rtb isotonic 3x10 RTB isotonic 3x10 RTB RTB 2x10 RTB   2 x10  GTB 2x10 GTB 2x10 2 x10      Pulley's 5 min 5 min 5 min 5 min 5 min 5 mins  5 min 3 min 5 mins  5'    Lawn chair flexion      --    npP      sidelying ER to neutral 2# x30 2# x30 3# x20 3# x20 3# 3x10 3# 3 x10  3# 3x10 3# 3x10 1# 3 x10   2# 30x    Ball up wall AAROM flexion  2x20 2x20    --   OPB 2 x20   2x20   standing cane flexion 2# x30 2# x20 (DB) 2# x20 (DB) 2# x20 (DB)  2# x20 (DB) 2# x 20  3# x20 3# x20 1/2 # 2 x10   1# 20x   Tband rows GTB x20 GTB x20 GTB x30 BTB x20 BTB  x20 BTB   X 20  btb x20 BTB x20 GTB  x20  GTB 20x    FTW Foam roll x20 Foam roll x20 Foam roll x30 Foam roll x30 Foam roll 2x10 Foam roller   X 30  Foam roll x30 Foam roll x30 Foam roll   2 x10   foam roll 20x    hitch hikers  1# x30 2# x20 2# x20 2# x20  2x10 2# 2# 2 x10  3# 2x10 3# 2x10 1# 2 x10   1# 20x   Prone I T  1# 3x10 & Y 2# 2x10 & Y 2# 2x10 & Y 2# 2x10 2x10  2#  &Y 2 x10 2#  2# 3x10 & Y 2# 3x10 & Y 1# 2 x10 ea 1# 2x10   Wall ball circles Red x20 ea Red x30 ea Red x30 ea Red x30 ea Red  x30 ea Red x 30  Red x30 Red x30 Read   X 20 ea Red 20x ea    SL ABD 1# 2x10 2# 2x10 2# 2x10 3# 2x10 3#  2x10 3# 3 x10  3# 3x10 3# 3x10 1# 2 x10  1# 2x10   Eccentric lowering       1/2 # x10 1/2 # x10         Assessment: Patient demonstrating weakness in periscapular region  Patient demonstrating R shoulder muscle fatigue post session  Patient would benefit from continued PT in order to improve R shoulder strength and endurance  Plan: Continue per plan of care

## 2018-12-19 DIAGNOSIS — I10 HYPERTENSION, UNSPECIFIED TYPE: ICD-10-CM

## 2018-12-19 RX ORDER — METOPROLOL SUCCINATE 50 MG/1
25 TABLET, EXTENDED RELEASE ORAL DAILY
Qty: 30 TABLET | Refills: 0 | Status: CANCELLED | OUTPATIENT
Start: 2018-12-19

## 2018-12-19 RX ORDER — LISINOPRIL 20 MG/1
20 TABLET ORAL DAILY
Qty: 90 TABLET | Refills: 0 | Status: CANCELLED | OUTPATIENT
Start: 2018-12-19

## 2018-12-19 RX ORDER — AMLODIPINE BESYLATE 10 MG/1
10 TABLET ORAL DAILY
Qty: 90 TABLET | Refills: 0 | Status: CANCELLED | OUTPATIENT
Start: 2018-12-19

## 2018-12-21 ENCOUNTER — OFFICE VISIT (OUTPATIENT)
Dept: PHYSICAL THERAPY | Facility: CLINIC | Age: 53
End: 2018-12-21
Payer: COMMERCIAL

## 2018-12-21 DIAGNOSIS — S43.431D SUPERIOR GLENOID LABRUM LESION OF RIGHT SHOULDER, SUBSEQUENT ENCOUNTER: ICD-10-CM

## 2018-12-21 DIAGNOSIS — M25.511 RIGHT SHOULDER PAIN, UNSPECIFIED CHRONICITY: ICD-10-CM

## 2018-12-21 DIAGNOSIS — G89.29 CHRONIC RIGHT SHOULDER PAIN: Primary | ICD-10-CM

## 2018-12-21 DIAGNOSIS — M25.511 CHRONIC RIGHT SHOULDER PAIN: Primary | ICD-10-CM

## 2018-12-21 DIAGNOSIS — I10 HYPERTENSION, UNSPECIFIED TYPE: ICD-10-CM

## 2018-12-21 DIAGNOSIS — M75.41 IMPINGEMENT SYNDROME OF RIGHT SHOULDER: ICD-10-CM

## 2018-12-21 DIAGNOSIS — Z47.89 AFTERCARE FOLLOWING SURGERY OF THE MUSCULOSKELETAL SYSTEM: ICD-10-CM

## 2018-12-21 PROCEDURE — 97112 NEUROMUSCULAR REEDUCATION: CPT

## 2018-12-21 PROCEDURE — 97140 MANUAL THERAPY 1/> REGIONS: CPT

## 2018-12-21 NOTE — PROGRESS NOTES
Daily Note     Today's date: 2018  Patient name: Hanh Rodney  : 1965  MRN: [id number]  Referring provider: Derrick Coe PA-C  Dx:   Encounter Diagnosis     ICD-10-CM    1  Chronic right shoulder pain M25 511     G89 29    2  Aftercare following surgery of the musculoskeletal system Z47 89    3  Superior glenoid labrum lesion of right shoulder, subsequent encounter S43 431D    4  Right shoulder pain, unspecified chronicity M25 511    5  Impingement syndrome of right shoulder M75 41            7:30- 7:38 IEP, 7:38- 8:10 1:1 CF        Subjective:  Pt states he continues to be sore from hitting a heavy bag a few days ago  Pt states he continues to do his HEP         Objective: See treatment diary below    Precautions: HTN, Right SLAP and posterior labrum repair   (protocol attached to chart and scanned into media      Daily Treatment Diary      Manual     Shoulder flexion PROM within protocol limits flexion as tolerated 3' 4' 4' 5' 5' CF 5' 5' CF 3'   Scaption PROM 4' 3' 2' 2' 2' CF 2' 2' CF 3'   ER PROM 3' 3' 4' 3' 3' CF 3' 3' CF 4'                                         Exercise Diary     Right wrist flexion/extension              Right finger flexion/extension             Right finder abd/add             UBE    2'f/2'b 3'f/3'b 3'/3' 3'f/3'b 3'f/3'b 3/3     Body blade  30"x3 ea 30"x3 30"x3 30"x3 30" x 3 30"x3 30"x3 30" x 3 30"x3 ea    Supine cane flexion AAROM 2# 2x10 2# 2x10 2# 2x10 2# 3x10 2#  3x10 2# 3 x10 2# 3x10 2# 3x10 2# 3 x10    2 x 10 1#    Supine cane ER AAROM 2# 2x10 2# 2x10 2# 2x10 2# 3x10 2#  3x10 2# 3 x10  2# 3x10 2# 3x10 2#  3 x10  2 x 10 1#    FRO 2x10 2x10 3x10 3x10 3x10 3 x10 3x10 3x10 2 x10  2 x 10    Table flexion  pball roll out on table               Tband ER/IR isometric walkout isotonic 2x10 rtb Isotonic 2x10 rtb isotonic 3x10 RTB isotonic 3x10 RTB RTB 2x10 RTB   2 x10  GTB 2x10 GTB 2x10 GTB   2 x10      Pulley's 5 min 5 min 5 min 5 min 5 min 5 mins  5 min 3 min 3 mins   5'    Lawn chair flexion      --         sidelying ER to neutral 2# x30 2# x30 3# x20 3# x20 3# 3x10 3# 3 x10  3# 3x10 3# 3x10 3# 3 x10   2# 30x    Ball up wall AAROM flexion  2x20 2x20    --     2x20   standing cane flexion 2# x30 2# x20 (DB) 2# x20 (DB) 2# x20 (DB)  2# x20 (DB) 2# x 20  3# x20 3# x20 3 # x 20   1# 20x   Tband rows GTB x20 GTB x20 GTB x30 BTB x20 BTB  x20 BTB   X 20  btb x20 BTB x20 BTB x20   GTB 20x    FTW Foam roll x20 Foam roll x20 Foam roll x30 Foam roll x30 Foam roll 2x10 Foam roller   X 30  Foam roll x30 Foam roll x30 Foam roll x 30   foam roll 20x    hitch hikers  1# x30 2# x20 2# x20 2# x20  2x10 2# 2# 2 x10  3# 2x10 3# 2x10 3# 2 x10   1# 20x   Prone I T  1# 3x10 & Y 2# 2x10 & Y 2# 2x10 & Y 2# 2x10 2x10  2#  &Y 2 x10 2#  2# 3x10 & Y 2# 3x10 & Y 2# 3 x10 & Y 1# 2x10   Wall ball circles Red x20 ea Red x30 ea Red x30 ea Red x30 ea Red  x30 ea Red x 30  Red x30 Red x30 Red x 30  Red 20x ea    SL ABD 1# 2x10 2# 2x10 2# 2x10 3# 2x10 3#  2x10 3# 3 x10  3# 3x10 3# 3x10 3# 3 x10  1# 2x10   Eccentric lowering       1/2 # x10 1/2 # x10 1/2# x 10         Assessment: Pt progressed through exercises well with no increase of pain in his right shoulder  Pt demonstrates good scapular control seen with his prone table exercises  Pt demonstrates good eccentric control with his 1/2 lowering  Pt present with slight discomfort at end range seen with manual therapy  Plan: Continue per plan of care

## 2018-12-24 RX ORDER — METOPROLOL SUCCINATE 50 MG/1
TABLET, EXTENDED RELEASE ORAL
Qty: 30 TABLET | Refills: 0 | Status: SHIPPED | OUTPATIENT
Start: 2018-12-24 | End: 2019-02-13

## 2018-12-28 ENCOUNTER — OFFICE VISIT (OUTPATIENT)
Dept: PHYSICAL THERAPY | Facility: CLINIC | Age: 53
End: 2018-12-28
Payer: COMMERCIAL

## 2018-12-28 DIAGNOSIS — S43.431D SUPERIOR GLENOID LABRUM LESION OF RIGHT SHOULDER, SUBSEQUENT ENCOUNTER: ICD-10-CM

## 2018-12-28 DIAGNOSIS — Z47.89 AFTERCARE FOLLOWING SURGERY OF THE MUSCULOSKELETAL SYSTEM: Primary | ICD-10-CM

## 2018-12-28 DIAGNOSIS — M25.511 RIGHT SHOULDER PAIN, UNSPECIFIED CHRONICITY: ICD-10-CM

## 2018-12-28 PROCEDURE — 97140 MANUAL THERAPY 1/> REGIONS: CPT | Performed by: PHYSICAL THERAPIST

## 2018-12-28 PROCEDURE — 97110 THERAPEUTIC EXERCISES: CPT | Performed by: PHYSICAL THERAPIST

## 2018-12-28 PROCEDURE — 97112 NEUROMUSCULAR REEDUCATION: CPT | Performed by: PHYSICAL THERAPIST

## 2018-12-28 NOTE — PROGRESS NOTES
Daily Note     Today's date: 2018  Patient name: Kelsey Leiva  : 1965  MRN: [id number]  Referring provider: Yogi Chavez PA-C  Dx:   Encounter Diagnosis     ICD-10-CM    1  Aftercare following surgery of the musculoskeletal system Z47 89    2  Superior glenoid labrum lesion of right shoulder, subsequent encounter S43 431D    3  Right shoulder pain, unspecified chronicity M25 511        Start Time: 1730  Stop Time: 08  Total time in clinic (min): 898 minutes    Subjective: Patient reports that he continues to have pain in his right shoulder following punching a heavy bag a few weeks ago  He reports that this pain is deep in nature  Patient states that this pain is exacerbated with reaching overhead  He reports no pain when his arm is at rest  Patient reports that pain of 2-3/10 when reaching arm overhead  Patient states that he has been doing well with physical therapy regimen  Patient has follow up appointment with referring surgeon on 2018         Objective: See treatment diary below  Precautions: HTN, Right SLAP and posterior labrum repair   (protocol attached to chart and scanned into media      Daily Treatment Diary      Manual     Shoulder flexion PROM within protocol limits flexion as tolerated 3' 4' 4' 5' 5' CF 5' 5' CF TK   Scaption PROM 4' 3' 2' 2' 2' CF 2' 2' CF TK   ER PROM 3' 3' 4' 3' 3' CF 3' 3' CF TK   Margie Blankenship [address] oscillations                    TK                                 Exercise Diary     Right wrist flexion/extension                       Right finger flexion/extension                       Right finder abd/add                       UBE       2'f/2'b 3'f/3'b 3'/3' 3'f/3'b 3'f/3'b 3/3  NP   Body blade   30"x3 ea 30"x3 30"x3 30"x3 30" x 3 30"x3 30"x3 30" x 3 30"x3 ea    Supine cane flexion AAROM 2# 2x10 2# 2x10 2# 2x10 2# 3x10 2#  3x10 2# 3 x10 2# 3x10 2# 3x10 2# 3 x10     2 x 10 2#    Supine cane ER AAROM 2# 2x10 2# 2x10 2# 2x10 2# 3x10 2#  3x10 2# 3 x10  2# 3x10 2# 3x10 2#  3 x10  2 x 10 2#    FRO 2x10 2x10 3x10 3x10 3x10 3 x10 3x10 3x10 2 x10  2 x 10    Table flexion  pball roll out on table                         Tband ER/IR isometric walkout isotonic 2x10 rtb Isotonic 2x10 rtb isotonic 3x10 RTB isotonic 3x10 RTB RTB 2x10 RTB   2 x10  GTB 2x10 GTB 2x10 GTB   2 x10  GTB   2 x10    Pulley's 5 min 5 min 5 min 5 min 5 min 5 mins  5 min 3 min 3 mins   5'    Lawn chair flexion           --            sidelying ER to neutral 2# x30 2# x30 3# x20 3# x20 3# 3x10 3# 3 x10  3# 3x10 3# 3x10 3# 3 x10   2# 30x    Ball up wall AAROM flexion  2x20 2x20       --        2x20   standing cane flexion 2# x30 2# x20 (DB) 2# x20 (DB) 2# x20 (DB)  2# x20 (DB) 2# x 20  3# x20 3# x20 3 # x 20   1# 20x   Tband rows GTB x20 GTB x20 GTB x30 BTB x20 BTB  x20 BTB   X 20  btb x20 BTB x20 BTB x20   GTB 20x    FTW Foam roll x20 Foam roll x20 Foam roll x30 Foam roll x30 Foam roll 2x10 Foam roller   X 30  Foam roll x30 Foam roll x30 Foam roll x 30   foam roll 20x    hitch hikers  1# x30 2# x20 2# x20 2# x20  2x10 2# 2# 2 x10  3# 2x10 3# 2x10 3# 2 x10   1# 20x   Prone I T  1# 3x10 & Y 2# 2x10 & Y 2# 2x10 & Y 2# 2x10 2x10  2#  &Y 2 x10 2#  2# 3x10 & Y 2# 3x10 & Y 2# 3 x10 & Y 1# 2x10   Wall ball circles Red x20 ea Red x30 ea Red x30 ea Red x30 ea Red  x30 ea Red x 30  Red x30 Red x30 Red x 30  Red 20x ea    SL ABD 1# 2x10 2# 2x10 2# 2x10 3# 2x10 3#  2x10 3# 3 x10  3# 3x10 3# 3x10 3# 3 x10  1# 2x10   Eccentric lowering             1/2 # x10 1/2 # x10 1/2# x 10  1/2# x 10          Assessment: Patient continues to demonstrate full and pain free right shoulder PROM in all planes  Patient reported abolished pain with right shoulder elevation following manual PROM and low grade oscillations  Patient noted replication of pain with right shoulder IR MMT, however 4/5 strength noted   Will progress patient as indicated following follow up with referring surgeon scheduled for 12/31/2018      Plan: Continue per plan of care

## 2018-12-31 ENCOUNTER — OFFICE VISIT (OUTPATIENT)
Dept: OBGYN CLINIC | Facility: HOSPITAL | Age: 53
End: 2018-12-31
Payer: COMMERCIAL

## 2018-12-31 VITALS
SYSTOLIC BLOOD PRESSURE: 142 MMHG | BODY MASS INDEX: 31.54 KG/M2 | WEIGHT: 201.4 LBS | HEART RATE: 56 BPM | DIASTOLIC BLOOD PRESSURE: 94 MMHG

## 2018-12-31 DIAGNOSIS — M75.41 IMPINGEMENT SYNDROME OF RIGHT SHOULDER: Primary | ICD-10-CM

## 2018-12-31 PROCEDURE — 20610 DRAIN/INJ JOINT/BURSA W/O US: CPT | Performed by: PHYSICIAN ASSISTANT

## 2018-12-31 PROCEDURE — 99213 OFFICE O/P EST LOW 20 MIN: CPT | Performed by: PHYSICIAN ASSISTANT

## 2018-12-31 RX ORDER — BUPIVACAINE HYDROCHLORIDE 2.5 MG/ML
2 INJECTION, SOLUTION INFILTRATION; PERINEURAL
Status: COMPLETED | OUTPATIENT
Start: 2018-12-31 | End: 2018-12-31

## 2018-12-31 RX ORDER — BETAMETHASONE SODIUM PHOSPHATE AND BETAMETHASONE ACETATE 3; 3 MG/ML; MG/ML
6 INJECTION, SUSPENSION INTRA-ARTICULAR; INTRALESIONAL; INTRAMUSCULAR; SOFT TISSUE
Status: COMPLETED | OUTPATIENT
Start: 2018-12-31 | End: 2018-12-31

## 2018-12-31 RX ADMIN — BUPIVACAINE HYDROCHLORIDE 2 ML: 2.5 INJECTION, SOLUTION INFILTRATION; PERINEURAL at 08:51

## 2018-12-31 RX ADMIN — BETAMETHASONE SODIUM PHOSPHATE AND BETAMETHASONE ACETATE 6 MG: 3; 3 INJECTION, SUSPENSION INTRA-ARTICULAR; INTRALESIONAL; INTRAMUSCULAR; SOFT TISSUE at 08:51

## 2018-12-31 NOTE — PROGRESS NOTES
Assessment  Diagnoses and all orders for this visit:    Impingement syndrome of right shoulder    Discussion and Plan:    1  Subacromial steroid injection for pain relief  It does not appear that Anyi Zarco has done any damage to the right shoulder by punching the heavy bag a few weeks ago  His exam is consistent with impingement syndrome, so an injection was provided for pain relief  2  Continue with therapy and transition to home program when appropriate  3  Follow up PRN  If symptoms persist, Anyi Zarco will call the office  Subjective:   Patient ID: Tara Charlton is a 48 y o  male      Anyi Zarco presents 4 months following SLAP and posterior labral repairs of the right shoulder  He states he was doing really well until 2 weeks ago  He punched a heavy bag once to show his wife how to properly throw a punch  He had a sharp pain and has had pain with certain motions since that time  He denies pain that interferes with sleep  He denies pain that interferes with activities of daily living or activities of enjoyment  He continues to work with his therapist to regain endurance and internal rotation  No numbness or tingling  The following portions of the patient's history were reviewed and updated as appropriate: allergies, current medications, past family history, past medical history, past social history, past surgical history and problem list     Review of Systems   Constitutional: Negative for chills and fever  HENT: Negative for hearing loss  Eyes: Negative for visual disturbance  Respiratory: Negative for shortness of breath  Cardiovascular: Negative for chest pain  Gastrointestinal: Negative for abdominal pain  Musculoskeletal:        As reviewed in the HPI   Skin: Negative for rash  Neurological:        As reviewed in the HPI   Psychiatric/Behavioral: Negative for agitation         Objective:  Right Shoulder Exam     Tenderness   None    Range of Motion   Passive Abduction: Normal  Forward Flexion:                        180  External Rotation:                      60    Muscle Strength   Normal right shoulder strength    Tests   Impingement:   Positive  Breaux:          Positive  Cross Arm:      Negative  Drop Arm:        Negative    Comments:  Healed arthroscopic portals  Negative speeds  Negative o'briens  Negative jerk          Physical Exam   Constitutional: He is oriented to person, place, and time  He appears well-developed and well-nourished  HENT:   Head: Normocephalic  Eyes: EOM are normal    Neck: Normal range of motion  Pulmonary/Chest: No respiratory distress  He has no wheezes  Neurological: He is alert and oriented to person, place, and time  Skin: Skin is warm and dry  Psychiatric: He has a normal mood and affect   His behavior is normal  Judgment and thought content normal        Large joint arthrocentesis  Date/Time: 12/31/2018 8:51 AM  Consent given by: patient  Timeout: Immediately prior to procedure a time out was called to verify the correct patient, procedure, equipment, support staff and site/side marked as required   Supporting Documentation  Indications: pain   Procedure Details  Location: shoulder - R subacromial bursa  Preparation: Patient was prepped and draped in the usual sterile fashion  Needle size: 22 G  Ultrasound guidance: no  Approach: lateral  Medications administered: 2 mL bupivacaine 0 25 %; 6 mg betamethasone acetate-betamethasone sodium phosphate 6 (3-3) mg/mL    Patient tolerance: patient tolerated the procedure well with no immediate complications  Dressing:  Sterile dressing applied

## 2018-12-31 NOTE — PATIENT INSTRUCTIONS

## 2019-01-02 ENCOUNTER — OFFICE VISIT (OUTPATIENT)
Dept: PHYSICAL THERAPY | Facility: CLINIC | Age: 54
End: 2019-01-02
Payer: COMMERCIAL

## 2019-01-02 DIAGNOSIS — Z47.89 AFTERCARE FOLLOWING SURGERY OF THE MUSCULOSKELETAL SYSTEM: Primary | ICD-10-CM

## 2019-01-02 DIAGNOSIS — M25.511 RIGHT SHOULDER PAIN, UNSPECIFIED CHRONICITY: ICD-10-CM

## 2019-01-02 DIAGNOSIS — M25.511 CHRONIC RIGHT SHOULDER PAIN: ICD-10-CM

## 2019-01-02 DIAGNOSIS — G89.29 CHRONIC RIGHT SHOULDER PAIN: ICD-10-CM

## 2019-01-02 DIAGNOSIS — M75.41 IMPINGEMENT SYNDROME OF RIGHT SHOULDER: ICD-10-CM

## 2019-01-02 DIAGNOSIS — S43.431D SUPERIOR GLENOID LABRUM LESION OF RIGHT SHOULDER, SUBSEQUENT ENCOUNTER: ICD-10-CM

## 2019-01-02 PROCEDURE — 97110 THERAPEUTIC EXERCISES: CPT

## 2019-01-02 PROCEDURE — 97140 MANUAL THERAPY 1/> REGIONS: CPT

## 2019-01-02 PROCEDURE — 97112 NEUROMUSCULAR REEDUCATION: CPT

## 2019-01-02 NOTE — PROGRESS NOTES
Daily Note     Today's date: 2019  Patient name: Wood Correia  : 1965  MRN: [id number]  Referring provider: Mauricio Hassan PA-C  Dx:   Encounter Diagnosis     ICD-10-CM    1  Aftercare following surgery of the musculoskeletal system Z47 89    2  Superior glenoid labrum lesion of right shoulder, subsequent encounter S43 431D    3  Right shoulder pain, unspecified chronicity M25 511    4  Chronic right shoulder pain M25 511     G89 29    5  Impingement syndrome of right shoulder M75 41                   Subjective: Pt states he received shot in R shoulder on  and has been feeling better since  Patient states he does have some pain with end range flexion        Objective: See treatment diary below    Precautions: HTN, Right SLAP and posterior labrum repair   (protocol attached to chart and scanned into media      Daily Treatment Diary      Manual     Shoulder flexion PROM within protocol limits flexion as tolerated 4' 4' 4' 5' 5' CF 5' 5' CF TK   Scaption PROM 3' 3' 2' 2' 2' CF 2' 2' CF TK   ER PROM 3' & IR 3' 4' 3' 3' CF 3' 3' CF TK   Sheral Stains and II [address] oscillations                   TK                                Exercise Diary     Right wrist flexion/extension                      Right finger flexion/extension                      Right finder abd/add                      UBE 3'f/3'b     2'f/2'b 3'f/3'b 3'/3' 3'f/3'b 3'f/3'b 3/3  NP   Body blade 30"x3 30"x3 ea 30"x3 30"x3 30"x3 30" x 3 30"x3 30"x3 30" x 3 30"x3 ea    Supine cane flexion AAROM np 2# 2x10 2# 2x10 2# 3x10 2#  3x10 2# 3 x10 2# 3x10 2# 3x10 2# 3 x10     2 x 10 2#    Supine cane ER AAROM np 2# 2x10 2# 2x10 2# 3x10 2#  3x10 2# 3 x10  2# 3x10 2# 3x10 2#  3 x10  2 x 10 2#    FRO 2x10 2x10 3x10 3x10 3x10 3 x10 3x10 3x10 2 x10  2 x 10    Table flexion  pball roll out on table                        Tband ER/IR isometric walkout BTB 2x10 Isotonic 2x10 rtb isotonic 3x10 RTB isotonic 3x10 RTB RTB 2x10 RTB   2 x10  GTB 2x10 GTB 2x10 GTB   2 x10  GTB   2 x10    Pulley's 3 min 5 min 5 min 5 min 5 min 5 mins  5 min 3 min 3 mins   5'    Lawn chair flexion          --            sidelying ER to neutral 3# 3x10 2# x30 3# x20 3# x20 3# 3x10 3# 3 x10  3# 3x10 3# 3x10 3# 3 x10   2# 30x    Ball up wall AAROM flexion   2x20       --        2x20   standing cane flexion 3# x20 (DB) 2# x20 (DB) 2# x20 (DB) 2# x20 (DB)  2# x20 (DB) 2# x 20  3# x20 3# x20 3 # x 20   1# 20x   Tband rows BTB 3x10 GTB x20 GTB x30 BTB x20 BTB  x20 BTB   X 20  btb x20 BTB x20 BTB x20   GTB 20x    FTW Foam roll 2x10 Foam roll x20 Foam roll x30 Foam roll x30 Foam roll 2x10 Foam roller   X 30  Foam roll x30 Foam roll x30 Foam roll x 30   foam roll 20x    hitch hikers  3# 2x10 2# x20 2# x20 2# x20  2x10 2# 2# 2 x10  3# 2x10 3# 2x10 3# 2 x10   1# 20x   Prone I T  3# 2x10 & Y 2# 2x10 & Y 2# 2x10 & Y 2# 2x10 2x10  2#  &Y 2 x10 2#  2# 3x10 & Y 2# 3x10 & Y 2# 3 x10 & Y 1# 2x10   Wall ball circles Red x30 ea Red x30 ea Red x30 ea Red x30 ea Red  x30 ea Red x 30  Red x30 Red x30 Red x 30  Red 20x ea    SL ABD 3# 2x10 2# 2x10 2# 2x10 3# 2x10 3#  2x10 3# 3 x10  3# 3x10 3# 3x10 3# 3 x10  1# 2x10   Eccentric lowering 1# x10           1/2 # x10 1/2 # x10 1/2# x 10  1/2# x 10    IR cane stretch BTB 10"x10                Assessment: Patient able to perform resisted AROM shoulder flexion without c/o pain or discomfort throughout ROM or at end range  Patient was able to progress with resistance and reps, as charted, without increased pain  Pt continues to have difficulty with IR behind the back  Addition of IR cane stretch to improve this  Plan: Continue per plan of care

## 2019-01-04 ENCOUNTER — OFFICE VISIT (OUTPATIENT)
Dept: PHYSICAL THERAPY | Facility: CLINIC | Age: 54
End: 2019-01-04
Payer: COMMERCIAL

## 2019-01-04 DIAGNOSIS — M25.511 RIGHT SHOULDER PAIN, UNSPECIFIED CHRONICITY: ICD-10-CM

## 2019-01-04 DIAGNOSIS — G89.29 CHRONIC RIGHT SHOULDER PAIN: ICD-10-CM

## 2019-01-04 DIAGNOSIS — Z47.89 AFTERCARE FOLLOWING SURGERY OF THE MUSCULOSKELETAL SYSTEM: Primary | ICD-10-CM

## 2019-01-04 DIAGNOSIS — M25.511 CHRONIC RIGHT SHOULDER PAIN: ICD-10-CM

## 2019-01-04 DIAGNOSIS — M75.41 IMPINGEMENT SYNDROME OF RIGHT SHOULDER: ICD-10-CM

## 2019-01-04 DIAGNOSIS — S43.431D SUPERIOR GLENOID LABRUM LESION OF RIGHT SHOULDER, SUBSEQUENT ENCOUNTER: ICD-10-CM

## 2019-01-04 PROCEDURE — 97140 MANUAL THERAPY 1/> REGIONS: CPT

## 2019-01-04 PROCEDURE — 97110 THERAPEUTIC EXERCISES: CPT

## 2019-01-04 PROCEDURE — 97112 NEUROMUSCULAR REEDUCATION: CPT

## 2019-01-04 NOTE — PROGRESS NOTES
Daily Note     Today's date: 2019  Patient name: Todd Mchugh  : 1965  MRN: [id number]  Referring provider: Laz Mata PA-C  Dx:   Encounter Diagnosis     ICD-10-CM    1  Aftercare following surgery of the musculoskeletal system Z47 89    2  Superior glenoid labrum lesion of right shoulder, subsequent encounter S43 431D    3  Right shoulder pain, unspecified chronicity M25 511    4  Chronic right shoulder pain M25 511     G89 29    5  Impingement syndrome of right shoulder M75 41                   Subjective: Pt states he has been able to take sugar down from cabinet without feeling too weak to control descend        Objective: See treatment diary below  Precautions: HTN, Right SLAP and posterior labrum repair   (protocol attached to chart and scanned into media      Daily Treatment Diary      Manual     Shoulder flexion PROM within protocol limits flexion as tolerated 4' 4' 4' 5' 5' CF 5' 5' CF TK   Scaption PROM 3' 3' 2' 2' 2' CF 2' 2' CF TK   ER PROM 3' & IR 3' 4' 3' 3' CF 3' 3' CF TK   Nayeli Simons and II [facility] oscillations                  TK                               Exercise Diary     Right wrist flexion/extension                     Right finger flexion/extension                     Right finder abd/add                     UBE 3'f/3'b NV   2'f/2'b 3'f/3'b 3'/3' 3'f/3'b 3'f/3'b 3/3  NP   Body blade 30"x3 30"x3 30"x3 30"x3 30"x3 30" x 3 30"x3 30"x3 30" x 3 30"x3 ea    Supine cane flexion AAROM np  2# 2x10 2# 3x10 2#  3x10 2# 3 x10 2# 3x10 2# 3x10 2# 3 x10     2 x 10 2#    Supine cane ER AAROM np  2# 2x10 2# 3x10 2#  3x10 2# 3 x10  2# 3x10 2# 3x10 2#  3 x10  2 x 10 2#    FRO 2x10 2x10 3x10 3x10 3x10 3 x10 3x10 3x10 2 x10  2 x 10    Table flexion  pball roll out on table                       Tband ER/IR isometric walkout BTB 2x10 BTB 2x10 isotonic 3x10 RTB isotonic 3x10 RTB RTB 2x10 RTB 2 x10  GTB 2x10 GTB 2x10 GTB   2 x10  GTB   2 x10    Pulley's 3 min 5' 5 min 5 min 5 min 5 mins  5 min 3 min 3 mins   5'    Lawn chair flexion         --            sidelying ER to neutral 3# 3x10 3# 3x10 3# x20 3# x20 3# 3x10 3# 3 x10  3# 3x10 3# 3x10 3# 3 x10   2# 30x    Ball up wall AAROM flexion          --        2x20   standing cane flexion 3# x20 (DB) 3# x20 2# x20 (DB) 2# x20 (DB)  2# x20 (DB) 2# x 20  3# x20 3# x20 3 # x 20   1# 20x   Tband rows BTB 3x10 Blk 2x10 GTB x30 BTB x20 BTB  x20 BTB   X 20  btb x20 BTB x20 BTB x20   GTB 20x    FTW Foam roll 2x10 Foam roll 2x10 Foam roll x30 Foam roll x30 Foam roll 2x10 Foam roller   X 30  Foam roll x30 Foam roll x30 Foam roll x 30   foam roll 20x    hitch hikers  3# 2x10 3# 2x10 2# x20 2# x20  2x10 2# 2# 2 x10  3# 2x10 3# 2x10 3# 2 x10   1# 20x   Prone I T  3# 2x10 3# 2x10 & Y 2# 2x10 & Y 2# 2x10 2x10  2#  &Y 2 x10 2#  2# 3x10 & Y 2# 3x10 & Y 2# 3 x10 & Y 1# 2x10   Wall ball circles Red x30 ea Red x30 ea Red x30 ea Red x30 ea Red  x30 ea Red x 30  Red x30 Red x30 Red x 30  Red 20x ea    SL ABD 3# 2x10 3# x30 2# 2x10 3# 2x10 3#  2x10 3# 3 x10  3# 3x10 3# 3x10 3# 3 x10  1# 2x10   Eccentric lowering 1# x10 1# x10         1/2 # x10 1/2 # x10 1/2# x 10  1/2# x 10    IR cane stretch BTB 10"x10 10"x10               Assessment: Patient continues to demonstrate R [facility] IR ROM limitations actively and passively  Progressing well with strengthening without c/o discomfort or pain  Will continue to progress as able  Plan: Continue per plan of care  Progress note during next visit

## 2019-01-07 NOTE — TELEPHONE ENCOUNTER
Spoke to patient to schedule appt  He will call tomorrow when he gets a ride      From: Tara Charlton  Sent: 12/19/2018 11:36 AM EST  Subject: Medication Renewal Request    Tara Zoltan would like a refill of the following medications:        lisinopril (ZESTRIL) 20 mg tablet [Sujey Mccabe DO]        amLODIPine (NORVASC) 10 mg tablet Jaxon Israel DO]        metoprolol succinate (TOPROL-XL) 50 mg 24 hr tablet Jaxon Israel DO]    Preferred pharmacy: [address] [place] [address] Avenue : [facility]

## 2019-01-08 ENCOUNTER — EVALUATION (OUTPATIENT)
Dept: PHYSICAL THERAPY | Facility: CLINIC | Age: 54
End: 2019-01-08
Payer: COMMERCIAL

## 2019-01-08 DIAGNOSIS — S43.431D SUPERIOR GLENOID LABRUM LESION OF RIGHT SHOULDER, SUBSEQUENT ENCOUNTER: ICD-10-CM

## 2019-01-08 DIAGNOSIS — Z47.89 AFTERCARE FOLLOWING SURGERY OF THE MUSCULOSKELETAL SYSTEM: Primary | ICD-10-CM

## 2019-01-08 DIAGNOSIS — M25.511 RIGHT SHOULDER PAIN, UNSPECIFIED CHRONICITY: ICD-10-CM

## 2019-01-08 PROCEDURE — 97140 MANUAL THERAPY 1/> REGIONS: CPT | Performed by: PHYSICAL THERAPIST

## 2019-01-08 PROCEDURE — 97110 THERAPEUTIC EXERCISES: CPT | Performed by: PHYSICAL THERAPIST

## 2019-01-08 NOTE — PROGRESS NOTES
PT Re-Evaluation     Today's date: 2019  Patient name: Rajat Caban  : 1965  MRN: [id number]  Referring provider: Demi Gustafson PA-C  Dx:   Encounter Diagnosis     ICD-10-CM    1  Aftercare following surgery of the musculoskeletal system Z47 89    2  Superior glenoid labrum lesion of right shoulder, subsequent encounter S43 431D    3  Right shoulder pain, unspecified chronicity M25 511        Start Time: 730  Stop Time: 828  Total time in clinic (min): 58 minutes    Assessment  Assessment details: Patient has demonstrated improvements in right shoulder AROM and strength  This has allowed for improved tolerance to lifting, carrying, and ADL's  He continues to demonstrate limitations in right shoulder strength  This is limiting his ability to performing activities overhead and performing recreation activities  He will continue to benefit from PT in order to address deficits contributing to functional limitations    Impairments: abnormal or restricted ROM, activity intolerance, impaired physical strength, lacks appropriate home exercise program and pain with function  Understanding of Dx/Px/POC: good   Prognosis: good    Goals  Short term goals:  1) Patient will demonstrate decrease in pain by 20-50% in 4 weeks- ongoing  2) Patient will demonstrate right shoulder flexion PROM >110 degrees in 6 weeks- met  3) Patient will demonstrate right shoulder ER PROM >30  in 6 weeks- met  Long term goals:  1) Patient will demonstrate ability to reach over head with right hand in 10 weeks- partially met   2) Patient will demonstrate ability to dress independently in 8 weeks in 6 weeks- met  3) Patient will demonstrate ability to complete light house work with right shoulder in 10 weeks-met  4) Patient will demonstrate independence in HEP 6 weeks-ongoing        Plan  Patient would benefit from: skilled PT  Planned modality interventions: cryotherapy  Planned therapy interventions: flexibility, graded exercise, home exercise program, therapeutic exercise, strengthening, stretching, patient education, neuromuscular re-education, manual therapy, joint mobilization, IADL retraining, functional ROM exercises and activity modification  Frequency: 2x week  Duration in weeks: 6  Treatment plan discussed with: patient        Subjective Evaluation    History of Present Illness  Mechanism of injury: Patient reports that he has noticed an improvement since his last re-evaluation  Patient reports that he has noticed an improvement with reaching overhead  Patient also notes that he has increased tolerance to lifting as he is now able to lift bags of dog food  Patient states that he can complete light house work without difficulty  Patient states that he has some difficulty with lifting things overhead  Patient reports that he has returned to 65-70% prior level of function  Pain  Current pain ratin  At best pain ratin  At worst pain ratin  Location: Interior right shoulder- intermittent, nonvarying, deep 'stabbing"  Quality: knife-like    Social Support  Lives in: multiple-level home    Employment status: working (Patient works full time0 self employed  Patient reports that he mostly work on computer, phone, and drives)  Hand dominance: right      Diagnostic Tests  X-ray: normal        Objective     Active Range of Motion   Left Shoulder   Flexion: 156 degrees   Abduction: 160 degrees   External rotation BTH: T4   Internal rotation BTB: T9     Right Shoulder   Flexion: 168 degrees   Abduction: 176 degrees   External rotation BTH: T2   Internal rotation BTB: T11     Additional Active Range of Motion Details  AROM of right shoulder not assessed due to being contraindicated per post operative protocol       Passive Range of Motion   Left Shoulder   Internal rotation 45°: with pain    Right Shoulder   Flexion: 148 degrees   Abduction: 167 degrees   External rotation 45°: 70 degrees   Internal rotation 45°: 30 degrees Strength/Myotome Testing     Left Shoulder     Planes of Motion   Abduction: 5   External rotation at 0°: 4+   Internal rotation at 0°: 5     Right Shoulder     Planes of Motion   Flexion: 4+   External rotation at 0°: 4+   Internal rotation at 0°: 4+              Objective: See treatment diary below       Daily Treatment Diary      Precautions: HTN, Right SLAP and posterior labrum repair  8/22 (protocol attached to chart and scanned into media      Daily Treatment Diary      Manual  1/2 1/4 1/8 12/4 12/6 12/11 12/14 12/18 12/21 12/28   Shoulder flexion PROM within protocol limits flexion as tolerated 4' 4' TK 5' 5' CF 5' 5' CF TK   Scaption PROM 3' 3' TK 2' 2' CF 2' 2' CF TK   ER PROM 3' & IR 3' TK 3' 3' CF 3' 3' CF TK   Shan Bone and II [facility] oscillations                    TK                                 Exercise Diary  1/2 1/4 1/8 12/4 12/7 12/11 12/14 12/18 12/21 12/28   Right wrist flexion/extension                       Right finger flexion/extension                       Right finder abd/add                       UBE 3'f/3'b NV   2'f/2'b 3'f/3'b 3'/3' 3'f/3'b 3'f/3'b 3/3  NP   Body blade 30"x3 30"x3 30"x3 30"x3 30"x3 30" x 3 30"x3 30"x3 30" x 3 30"x3 ea    Supine cane flexion AAROM np   2# 2x10 2# 3x10 2#  3x10 2# 3 x10 2# 3x10 2# 3x10 2# 3 x10     2 x 10 2#    Supine cane ER AAROM np   2# 2x10 2# 3x10 2#  3x10 2# 3 x10  2# 3x10 2# 3x10 2#  3 x10  2 x 10 2#    FRO 2x10 2x10 3x10 3x10 3x10 3 x10 3x10 3x10 2 x10  2 x 10    Table flexion  pball roll out on table                         Tband ER/IR isometric walkout BTB 2x10 BTB 2x10 BTB 2x10 isotonic 3x10 RTB RTB 2x10 RTB   2 x10  GTB 2x10 GTB 2x10 GTB   2 x10  GTB   2 x10    Pulley's 3 min 5' 5 min 5 min 5 min 5 mins  5 min 3 min 3 mins   5'    Lawn chair flexion           --            sidelying ER to neutral 3# 3x10 3# 3x10 3# x20 3# x20 3# 3x10 3# 3 x10  3# 3x10 3# 3x10 3# 3 x10   2# 30x    Ball up wall AAROM flexion            --        2x20   standing cane flexion 3# x20 (DB) 3# x20 3# x20 (DB) 2# x20 (DB)  2# x20 (DB) 2# x 20  3# x20 3# x20 3 # x 20   1# 20x   Tband rows BTB 3x10 Blk 2x10 GTB x30 BTB x20 BTB  x20 BTB   X 20  btb x20 BTB x20 BTB x20   GTB 20x    FTW Foam roll 2x10 Foam roll 2x10 Foam roll x30 Foam roll x30 Foam roll 2x10 Foam roller   X 30  Foam roll x30 Foam roll x30 Foam roll x 30   foam roll 20x    hitch hikers  3# 2x10 3# 2x10 3# 2x10 2# x20  2x10 2# 2# 2 x10  3# 2x10 3# 2x10 3# 2 x10   1# 20x   Prone I T  3# 2x10 3# 2x10 3# 2x10 & Y 2# 2x10 2x10  2#  &Y 2 x10 2#  2# 3x10 & Y 2# 3x10 & Y 2# 3 x10 & Y 1# 2x10   Wall ball circles Red x30 ea Red x30 ea Red x30 ea Red x30 ea Red  x30 ea Red x 30  Red x30 Red x30 Red x 30  Red 20x ea    SL ABD 3# 2x10 3# x30 2# 2x10 3# 2x10 3#  2x10 3# 3 x10  3# 3x10 3# 3x10 3# 3 x10  1# 2x10   Eccentric lowering 1# x10 1# x10  1# x10       1/2 # x10 1/2 # x10 1/2# x 10  1/2# x 10    IR cane stretch BTB 10"x10 10"x10 10"x10

## 2019-01-11 ENCOUNTER — OFFICE VISIT (OUTPATIENT)
Dept: PHYSICAL THERAPY | Facility: CLINIC | Age: 54
End: 2019-01-11
Payer: COMMERCIAL

## 2019-01-11 DIAGNOSIS — M25.511 CHRONIC RIGHT SHOULDER PAIN: ICD-10-CM

## 2019-01-11 DIAGNOSIS — Z47.89 AFTERCARE FOLLOWING SURGERY OF THE MUSCULOSKELETAL SYSTEM: Primary | ICD-10-CM

## 2019-01-11 DIAGNOSIS — M25.511 RIGHT SHOULDER PAIN, UNSPECIFIED CHRONICITY: ICD-10-CM

## 2019-01-11 DIAGNOSIS — G89.29 CHRONIC RIGHT SHOULDER PAIN: ICD-10-CM

## 2019-01-11 DIAGNOSIS — S43.431D SUPERIOR GLENOID LABRUM LESION OF RIGHT SHOULDER, SUBSEQUENT ENCOUNTER: ICD-10-CM

## 2019-01-11 DIAGNOSIS — M75.41 IMPINGEMENT SYNDROME OF RIGHT SHOULDER: ICD-10-CM

## 2019-01-11 PROCEDURE — 97110 THERAPEUTIC EXERCISES: CPT

## 2019-01-11 PROCEDURE — 97112 NEUROMUSCULAR REEDUCATION: CPT

## 2019-01-11 PROCEDURE — 97140 MANUAL THERAPY 1/> REGIONS: CPT

## 2019-01-11 NOTE — PROGRESS NOTES
Daily Note     Today's date: 2019  Patient name: Juli Duncan  : 1965  MRN: [id number]  Referring provider: Rebeca Andrade PA-C  Dx:   Encounter Diagnosis     ICD-10-CM    1  Aftercare following surgery of the musculoskeletal system Z47 89    2  Superior glenoid labrum lesion of right shoulder, subsequent encounter S43 431D    3  Right shoulder pain, unspecified chronicity M25 511    4  Chronic right shoulder pain M25 511     G89 29    5  Impingement syndrome of right shoulder M75 41                   Subjective: Patients chief complaint is shoulder stiffness and occasional soreness  Denies any sharp pain         Objective: See treatment diary below    Precautions: HTN, Right SLAP and posterior labrum repair   (protocol attached to chart and scanned into media      Daily Treatment Diary      Manual     Shoulder flexion PROM within protocol limits flexion as tolerated 4' 4' TK 4' 5' CF 5' 5' CF TK   Scaption PROM 3' 3' TK 3' 2' CF 2' 2' CF TK   ER PROM 3' & IR 3' TK 3' 3' CF 3' 3' CF TK   [place] and [place] [address] oscillations                    TK                                 Exercise Diary     Right wrist flexion/extension                       Right finger flexion/extension                       Right finder abd/add                       UBE 3'f/3'b NV   2'f/2'b 3'f/3'b 3'/3' 3'f/3'b 3'f/3'b 3/3  NP   Body blade 30"x3 30"x3 30"x3 30"x3 30"x3 30" x 3 30"x3 30"x3 30" x 3 30"x3 ea    Supine cane flexion AAROM np   2# 2x10 3# 3x10 2#  3x10 2# 3 x10 2# 3x10 2# 3x10 2# 3 x10     2 x 10 2#    Supine cane ER AAROM np   2# 2x10 2# 3x10 2#  3x10 2# 3 x10  2# 3x10 2# 3x10 2#  3 x10  2 x 10 2#    FRO 2x10 2x10 3x10 3x10 3x10 3 x10 3x10 3x10 2 x10  2 x 10    Table flexion  pball roll out on table                         Tband ER/IR isometric walkout BTB 2x10 BTB 2x10 BTB 2x10 isotonic 3x10 RTB RTB 2x10 RTB   2 x10  GTB 2x10 GTB 2x10 GTB   2 x10  GTB   2 x10    Pulley's 3 min 5' 5 min 5 min 5 min 5 mins  5 min 3 min 3 mins   5'    Lawn chair flexion           --            sidelying ER to neutral 3# 3x10 3# 3x10 3# x20 3# x20 3# 3x10 3# 3 x10  3# 3x10 3# 3x10 3# 3 x10   2# 30x    Ball up wall AAROM flexion            --        2x20   standing cane flexion 3# x20 (DB) 3# x20 3# x20 (DB) 2# x20 (DB)  2# x20 (DB) 2# x 20  3# x20 3# x20 3 # x 20   1# 20x   Tband rows BTB 3x10 Blk 2x10 GTB x30 BTB x20 BTB  x20 BTB   X 20  btb x20 BTB x20 BTB x20   GTB 20x    FTW Foam roll 2x10 Foam roll 2x10 Foam roll x30 Foam roll x30 Foam roll 2x10 Foam roller   X 30  Foam roll x30 Foam roll x30 Foam roll x 30   foam roll 20x    hitch hikers  3# 2x10 3# 2x10 3# 2x10 3# x20  2x10 2# 2# 2 x10  3# 2x10 3# 2x10 3# 2 x10   1# 20x   Prone I T  3# 2x10 3# 2x10 3# 2x10 & Y 3# 2x10 2x10  2#  &Y 2 x10 2#  2# 3x10 & Y 2# 3x10 & Y 2# 3 x10 & Y 1# 2x10   Wall ball circles Red x30 ea Red x30 ea Red x30 ea Red x30 ea Red  x30 ea Red x 30  Red x30 Red x30 Red x 30  Red 20x ea    SL ABD 3# 2x10 3# x30 3# 2x10 3# 2x10 3#  2x10 3# 3 x10  3# 3x10 3# 3x10 3# 3 x10  1# 2x10   Eccentric lowering 1# x10 1# x10  1# x10  1 5# x10     1/2 # x10 1/2 # x10 1/2# x 10  1/2# x 10    IR cane stretch BTB 10"x10 10"x10 10"x10  10"x10                 Assessment: Patient demonstrating limitations in shoulder IR passively and actively  Able to tolerate full TE program and weight progressions with no complaints of pain  Plan: Cont per POC  Progress as able

## 2019-01-15 ENCOUNTER — OFFICE VISIT (OUTPATIENT)
Dept: PHYSICAL THERAPY | Facility: CLINIC | Age: 54
End: 2019-01-15
Payer: COMMERCIAL

## 2019-01-15 DIAGNOSIS — M75.41 IMPINGEMENT SYNDROME OF RIGHT SHOULDER: ICD-10-CM

## 2019-01-15 DIAGNOSIS — S43.431D SUPERIOR GLENOID LABRUM LESION OF RIGHT SHOULDER, SUBSEQUENT ENCOUNTER: ICD-10-CM

## 2019-01-15 DIAGNOSIS — M25.511 CHRONIC RIGHT SHOULDER PAIN: ICD-10-CM

## 2019-01-15 DIAGNOSIS — M25.511 RIGHT SHOULDER PAIN, UNSPECIFIED CHRONICITY: ICD-10-CM

## 2019-01-15 DIAGNOSIS — Z47.89 AFTERCARE FOLLOWING SURGERY OF THE MUSCULOSKELETAL SYSTEM: Primary | ICD-10-CM

## 2019-01-15 DIAGNOSIS — G89.29 CHRONIC RIGHT SHOULDER PAIN: ICD-10-CM

## 2019-01-15 PROCEDURE — 97140 MANUAL THERAPY 1/> REGIONS: CPT

## 2019-01-15 PROCEDURE — 97112 NEUROMUSCULAR REEDUCATION: CPT

## 2019-01-15 PROCEDURE — 97110 THERAPEUTIC EXERCISES: CPT

## 2019-01-15 NOTE — PROGRESS NOTES
Daily Note     Today's date: 1/15/2019  Patient name: Margarito Fish  : 1965  MRN: [id number]  Referring provider: Rosa Maria Campos PA-C  Dx:   Encounter Diagnosis     ICD-10-CM    1  Aftercare following surgery of the musculoskeletal system Z47 89    2  Superior glenoid labrum lesion of right shoulder, subsequent encounter S43 431D    3  Right shoulder pain, unspecified chronicity M25 511    4  Chronic right shoulder pain M25 511     G89 29    5  Impingement syndrome of right shoulder M75 41                   Subjective: Pt states he continues to have difficulty with reaching behind back and bringing objects down from a shelf        Objective: See treatment diary below  Precautions: HTN, Right SLAP and posterior labrum repair   (protocol attached to chart and scanned into media      Daily Treatment Diary      Manual  1/2 1/4 1/8 1/11 1/15 12/11 12/14 12/18 12/21 12/28   Shoulder flexion PROM within protocol limits flexion as tolerated 4' 4' TK 4' 4' CF 5' 5' CF TK   Scaption PROM 3' 3' TK 3' 3' CF 2' 2' CF TK   ER PROM 3' & IR 3' TK 3' 3' CF 3' 3' CF TK   [place] and [place] [address] oscillations                   TK                                Exercise Diary  1/2 1/4 1/8 1/11 1/15 12/11 12/14 12/18 12/21 12/28   Right wrist flexion/extension                      Right finger flexion/extension                      Right finder abd/add                      UBE 3'f/3'b NV   2'f/2'b 3'f/3'b 3'/3' 3'f/3'b 3'f/3'b 3/3  NP   Body blade 30"x3 30"x3 30"x3 30"x3 30"x3   30" x 3 30"x3 30"x3 30" x 3 30"x3 ea    Supine cane flexion AAROM np   2# 2x10 3# 3x10  2# 3 x10 2# 3x10 2# 3x10 2# 3 x10     2 x 10 2#    Supine cane ER AAROM np   2# 2x10 2# 3x10  2# 3 x10  2# 3x10 2# 3x10 2#  3 x10  2 x 10 2#    FRO 2x10 2x10 3x10 3x10 3x10 3 x10 3x10 3x10 2 x10  2 x 10    Table flexion  pball roll out on table                        Tband ER/IR isometric walkout BTB 2x10 BTB 2x10 BTB 2x10 isotonic 3x10 RTB BTB 2x10 RTB   2 x10  GTB 2x10 GTB 2x10 GTB   2 x10  GTB   2 x10    Pulley's 3 min 5' 5 min 5 min  5 mins  5 min 3 min 3 mins   5'    Lawn chair flexion          --            sidelying ER to neutral 3# 3x10 3# 3x10 3# x20 3# x20 4# x20 3# 3 x10  3# 3x10 3# 3x10 3# 3 x10   2# 30x    Ball up wall AAROM flexion                   2x20   standing cane flexion 3# x20 (DB) 3# x20 3# x20 (DB) 2# x20 (DB) 3# 3x10 2# x 20  3# x20 3# x20 3 # x 20   1# 20x   Tband rows BTB 3x10 Blk 2x10 GTB x30 BTB x20 blk x20 BTB   X 20  btb x20 BTB x20 BTB x20   GTB 20x    FTW Foam roll 2x10 Foam roll 2x10 Foam roll x30 Foam roll x30 Foam RTB 2x10 Foam roller   X 30  Foam roll x30 Foam roll x30 Foam roll x 30   foam roll 20x    hitch hikers  3# 2x10 3# 2x10 3# 2x10 3# x20 3# 3x10 2# 2 x10  3# 2x10 3# 2x10 3# 2 x10   1# 20x   Prone I T  3# 2x10 3# 2x10 3# 2x10 & Y 3# 2x10 3# & Y 2x10 2 x10 2#  2# 3x10 & Y 2# 3x10 & Y 2# 3 x10 & Y 1# 2x10   Wall ball circles Red x30 ea Red x30 ea Red x30 ea Red x30 ea Red x30 ea Red x 30  Red x30 Red x30 Red x 30  Red 20x ea    SL ABD 3# 2x10 3# x30 3# 2x10 3# 2x10 4# 2x10 3# 3 x10  3# 3x10 3# 3x10 3# 3 x10  1# 2x10   Eccentric lowering 1# x10 1# x10  1# x10  1 5# x10 1 5# x10   1/2 # x10 1/2 # x10 1/2# x 10  1/2# x 10    IR cane stretch BTB 10"x10 10"x10 10"x10  10"x10 10"x10                 Assessment: Pt continues to have R GH IR limitations with pain at end range during self stretch  All other planes remain WFL at this time  Pt currently challenged with 3# dumbbells for periscapular strengthening  Pt reported R shoulder fatigue post session  Plan: Continue per plan of care

## 2019-01-18 ENCOUNTER — APPOINTMENT (OUTPATIENT)
Dept: PHYSICAL THERAPY | Facility: CLINIC | Age: 54
End: 2019-01-18
Payer: COMMERCIAL

## 2019-01-22 ENCOUNTER — OFFICE VISIT (OUTPATIENT)
Dept: PHYSICAL THERAPY | Facility: CLINIC | Age: 54
End: 2019-01-22
Payer: COMMERCIAL

## 2019-01-22 DIAGNOSIS — M25.511 CHRONIC RIGHT SHOULDER PAIN: ICD-10-CM

## 2019-01-22 DIAGNOSIS — S43.431D SUPERIOR GLENOID LABRUM LESION OF RIGHT SHOULDER, SUBSEQUENT ENCOUNTER: ICD-10-CM

## 2019-01-22 DIAGNOSIS — G89.29 CHRONIC RIGHT SHOULDER PAIN: ICD-10-CM

## 2019-01-22 DIAGNOSIS — M75.41 IMPINGEMENT SYNDROME OF RIGHT SHOULDER: ICD-10-CM

## 2019-01-22 DIAGNOSIS — Z47.89 AFTERCARE FOLLOWING SURGERY OF THE MUSCULOSKELETAL SYSTEM: Primary | ICD-10-CM

## 2019-01-22 DIAGNOSIS — M25.511 RIGHT SHOULDER PAIN, UNSPECIFIED CHRONICITY: ICD-10-CM

## 2019-01-22 PROCEDURE — 97110 THERAPEUTIC EXERCISES: CPT

## 2019-01-22 PROCEDURE — 97112 NEUROMUSCULAR REEDUCATION: CPT

## 2019-01-22 PROCEDURE — 97140 MANUAL THERAPY 1/> REGIONS: CPT

## 2019-01-22 NOTE — PROGRESS NOTES
Daily Note     Today's date: 2019  Patient name: Tashia Eaton  : 1965  MRN: [id number]  Referring provider: Dana Vela PA-C  Dx:   Encounter Diagnosis     ICD-10-CM    1  Aftercare following surgery of the musculoskeletal system Z47 89    2  Superior glenoid labrum lesion of right shoulder, subsequent encounter S43 431D    3  Right shoulder pain, unspecified chronicity M25 511    4  Chronic right shoulder pain M25 511     G89 29    5  Impingement syndrome of right shoulder M75 41                   Subjective: Patient offers no complaints regarding R shoulder upon arrival to session        Objective: See treatment diary below  Precautions: HTN, Right SLAP and posterior labrum repair   (protocol attached to chart and scanned into media      Daily Treatment Diary      Manual  1/2 1/4 1/8 1/11 1/15 1/22 12/14 12/18 12/21 12/28   Shoulder flexion PROM within protocol limits flexion as tolerated 4' 4' TK 4' 4' 4' 5' 5' CF TK   Scaption PROM 3' 3' TK 3' 3' 4' 2' 2' CF TK   ER PROM 3' & IR 3' TK 3' 3' 3' 3' 3' CF TK   [place] and [place] [address] oscillations                  TK                               Exercise Diary  1/2 1/4 1/8 1/11 1/15 1/22 12/14 12/18 12/21 12/28   Right wrist flexion/extension                     Right finger flexion/extension                     Right finder abd/add                     UBE 3'f/3'b NV   2'f/2'b 3'f/3'b 3'f/3'b 3'f/3'b 3'f/3'b 3/3  NP   Body blade 30"x3 30"x3 30"x3 30"x3 30"x3   30x3 30"x3 30"x3 30" x 3 30"x3 ea    Supine cane flexion AAROM np   2# 2x10 3# 3x10   2# 3x10 2# 3x10 2# 3 x10     2 x 10 2#    Supine cane ER AAROM np   2# 2x10 2# 3x10   2# 3x10 2# 3x10 2#  3 x10  2 x 10 2#    FRO 2x10 2x10 3x10 3x10 3x10 3x10 3x10 3x10 2 x10  2 x 10    Table flexion  pball roll out on table                       Tband ER/IR isometric walkout BTB 2x10 BTB 2x10 BTB 2x10 isotonic 3x10 RTB BTB 2x10 BTB 2x10- GTB 2x10 GTB 2x10 GTB   2 x10  GTB   2 x10    Pulley's 3 min 5' 5 min 5 min  3 min 5 min 3 min 3 mins   5'    Lawn chair flexion                      sidelying ER to neutral 3# 3x10 3# 3x10 3# x20 3# x20 4# x20 4# x20 3# 3x10 3# 3x10 3# 3 x10   2# 30x    Ball up wall AAROM flexion                   2x20   standing cane flexion 3# x20 (DB) 3# x20 3# x20 (DB) 2# x20 (DB) 3# 3x10 4# (DB) x20 3# x20 3# x20 3 # x 20   1# 20x   Tband rows BTB 3x10 Blk 2x10 GTB x30 BTB x20 blk x20 blk x20 btb x20 BTB x20 BTB x20   GTB 20x    FTW Foam roll 2x10 Foam roll 2x10 Foam roll x30 Foam roll x30 Foam RTB 2x10 Foam rtb 2x10 Foam roll x30 Foam roll x30 Foam roll x 30   foam roll 20x    hitch hikers  3# 2x10 3# 2x10 3# 2x10 3# x20 3# 3x10 4# x20 3# 2x10 3# 2x10 3# 2 x10   1# 20x   Prone I T  3# 2x10 3# 2x10 3# 2x10 & Y 3# 2x10 3# & Y 2x10 4# & Y 2x10 2# 3x10 & Y 2# 3x10 & Y 2# 3 x10 & Y 1# 2x10   Wall ball circles Red x30 ea Red x30 ea Red x30 ea Red x30 ea Red x30 ea Red x30 ea Red x30 Red x30 Red x 30  Red 20x ea    SL ABD 3# 2x10 3# x30 3# 2x10 3# 2x10 4# 2x10 4# x20 3# 3x10 3# 3x10 3# 3 x10  1# 2x10   Eccentric lowering 1# x10 1# x10  1# x10  1 5# x10 1 5# x10 1 5# x10 1/2 # x10 1/2 # x10 1/2# x 10  1/2# x 10    IR cane stretch BTB 10"x10 10"x10 10"x10  10"x10 10"x10 10"x10           Wall clock      rtb x10           Assessment: Patient reported R shoulder muscle soreness following addition of wall clock exercise  Consider addition of WB exercises nv to improve scap stabilization as able (within protocol limits)  Able to progress to 4# dumbbells for prone scap stabilization exercises without c/o pain or discomfort in R shoulder  Will continue to progress within surgical protocol as able  Plan: Continue per plan of care

## 2019-01-25 ENCOUNTER — OFFICE VISIT (OUTPATIENT)
Dept: PHYSICAL THERAPY | Facility: CLINIC | Age: 54
End: 2019-01-25
Payer: COMMERCIAL

## 2019-01-25 DIAGNOSIS — M75.41 IMPINGEMENT SYNDROME OF RIGHT SHOULDER: ICD-10-CM

## 2019-01-25 DIAGNOSIS — M25.511 RIGHT SHOULDER PAIN, UNSPECIFIED CHRONICITY: ICD-10-CM

## 2019-01-25 DIAGNOSIS — S43.431D SUPERIOR GLENOID LABRUM LESION OF RIGHT SHOULDER, SUBSEQUENT ENCOUNTER: ICD-10-CM

## 2019-01-25 DIAGNOSIS — Z47.89 AFTERCARE FOLLOWING SURGERY OF THE MUSCULOSKELETAL SYSTEM: Primary | ICD-10-CM

## 2019-01-25 DIAGNOSIS — M25.511 CHRONIC RIGHT SHOULDER PAIN: ICD-10-CM

## 2019-01-25 DIAGNOSIS — G89.29 CHRONIC RIGHT SHOULDER PAIN: ICD-10-CM

## 2019-01-25 PROCEDURE — 97140 MANUAL THERAPY 1/> REGIONS: CPT | Performed by: PHYSICAL THERAPIST

## 2019-01-25 PROCEDURE — 97112 NEUROMUSCULAR REEDUCATION: CPT | Performed by: PHYSICAL THERAPIST

## 2019-01-25 PROCEDURE — 97110 THERAPEUTIC EXERCISES: CPT | Performed by: PHYSICAL THERAPIST

## 2019-01-25 NOTE — PROGRESS NOTES
Daily Note     Today's date: 2019  Patient name: Alina Kelley  : 1965  MRN: [id number]  Referring provider: Nathalie Escamilla PA-C  Dx:   Encounter Diagnosis     ICD-10-CM    1  Aftercare following surgery of the musculoskeletal system Z47 89    2  Superior glenoid labrum lesion of right shoulder, subsequent encounter S43 431D    3  Right shoulder pain, unspecified chronicity M25 511    4  Chronic right shoulder pain M25 511     G89 29    5  Impingement syndrome of right shoulder M75 41        Start Time: 730  Stop Time: 822  Total time in clinic (min): 52 minutes    Subjective: Patient reports that he was sore in his right shoulder yesterday  He states that he did not do anything out of the ordinary  He reports that he feels good today and does not report pain        Objective: See treatment diary below  Precautions: HTN, Right SLAP and posterior labrum repair   (protocol attached to chart and scanned into media      Daily Treatment Diary      Manual  1/2 1/4 1/8 1/11 1/15 1/22 1/25 12/18 12/21 12/28   Shoulder flexion PROM within protocol limits flexion as tolerated 4' 4' TK 4' 4' 4' 6 TK 5' CF TK   Scaption PROM 3' 3' TK 3' 3' 4' 2' TK 2' CF TK   ER PROM 3' & IR 3' TK 3' 3' 3' 3' TK 3' CF TK    Grade I and II [facility] oscillations                    TK                                 Exercise Diary  1/2 1/4 1/8 1/11 1/15 1/22 1/25 12/18 12/21 12/28   UBE 3'f/3'b NV   2'f/2'b 3'f/3'b 3'f/3'b 3'f/3'b 3'f/3'b 3/3  NP   Body blade 30"x3 30"x3 30"x3 30"x3 30"x3    30x3 30"x3 30"x3 30" x 3 30"x3 ea    FRO 2x10 2x10 3x10 3x10 3x10 3x10 3x10 3x10 2 x10  2 x 10    Tband ER/IR isometric walkout BTB 2x10 BTB 2x10 BTB 2x10 isotonic 3x10 RTB BTB 2x10 BTB 2x10- GTB 2x10 GTB 2x10 GTB   2 x10  GTB   2 x10    Pulley's 3 min 5' 5 min 5 min   3 min 5 min 3 min 3 mins   5'    sidelying ER to neutral 3# 3x10 3# 3x10 3# x20 3# x20 4# x20 4# x20 3# 3x10 3# 3x10 3# 3 x10   2# 30x   standing cane flexion 3# x20 (DB) 3# x20 3# x20 (DB) 2# x20 (DB) 3# 3x10 4# (DB) x20 3# x20 3# x20 3 # x 20   1# 20x   Tband rows BTB 3x10 Blk 2x10 GTB x30 BTB x20 blk x20 blk x20 btb x20 BTB x20 BTB x20   GTB 20x    FTW Foam roll 2x10 Foam roll 2x10 Foam roll x30 Foam roll x30 Foam RTB 2x10 Foam rtb 2x10 Foam roll x30 Foam roll x30 Foam roll x 30   foam roll 20x   Prone I T  3# 2x10 3# 2x10 3# 2x10 & Y 3# 2x10 3# & Y 2x10 4# & Y 2x10 3# 2x10 2# 3x10 & Y 2# 3 x10 & Y 1# 2x10   Wall ball circles Red x30 ea Red x30 ea Red x30 ea Red x30 ea Red x30 ea Red x30 ea Red x30 Red x30 Red x 30  Red 20x ea    SL ABD 3# 2x10 3# x30 3# 2x10 3# 2x10 4# 2x10 4# x20 3# 3x10 3# 3x10 3# 3 x10  1# 2x10   Eccentric lowering 1# x10 1# x10  1# x10  1 5# x10 1 5# x10 1 5# x10 1/2 # x10 1/2 # x10 1/2# x 10  1/2# x 10    IR cane stretch BTB 10"x10 10"x10 10"x10  10"x10 10"x10 10"x10  10"x10         Wall clock           rtb x10 rtb x10             Assessment: Patient demonstrated full and pain free right shoulder PROM this date during manual treatment  Patient tolerated treatment session well this date without complaints of pain  He demonstrated improved right shoulder behind the back IR during cane walk around activity  Patient was donna to reach 165 degrees of right shoulder flexion AROM  And 174 degrees of AROM abduction  He will continue to benefit from PT in order to improved right shoulder strength and endurance  Plan: Continue per plan of care

## 2019-01-29 ENCOUNTER — OFFICE VISIT (OUTPATIENT)
Dept: PHYSICAL THERAPY | Facility: CLINIC | Age: 54
End: 2019-01-29
Payer: COMMERCIAL

## 2019-01-29 DIAGNOSIS — Z47.89 AFTERCARE FOLLOWING SURGERY OF THE MUSCULOSKELETAL SYSTEM: Primary | ICD-10-CM

## 2019-01-29 DIAGNOSIS — S43.431D SUPERIOR GLENOID LABRUM LESION OF RIGHT SHOULDER, SUBSEQUENT ENCOUNTER: ICD-10-CM

## 2019-01-29 DIAGNOSIS — M25.511 CHRONIC RIGHT SHOULDER PAIN: ICD-10-CM

## 2019-01-29 DIAGNOSIS — G89.29 CHRONIC RIGHT SHOULDER PAIN: ICD-10-CM

## 2019-01-29 DIAGNOSIS — M75.41 IMPINGEMENT SYNDROME OF RIGHT SHOULDER: ICD-10-CM

## 2019-01-29 DIAGNOSIS — M25.511 RIGHT SHOULDER PAIN, UNSPECIFIED CHRONICITY: ICD-10-CM

## 2019-01-29 PROCEDURE — 97112 NEUROMUSCULAR REEDUCATION: CPT

## 2019-01-29 PROCEDURE — 97110 THERAPEUTIC EXERCISES: CPT

## 2019-01-29 PROCEDURE — 97140 MANUAL THERAPY 1/> REGIONS: CPT

## 2019-01-29 NOTE — PROGRESS NOTES
Daily Note     Today's date: 2019  Patient name: Ruben Prakash  : 1965  MRN: [id number]  Referring provider: Talya Lynn PA-C  Dx:   Encounter Diagnosis     ICD-10-CM    1  Aftercare following surgery of the musculoskeletal system Z47 89    2  Superior glenoid labrum lesion of right shoulder, subsequent encounter S43 431D    3  Right shoulder pain, unspecified chronicity M25 511    4  Chronic right shoulder pain M25 511     G89 29    5  Impingement syndrome of right shoulder M75 41                   Subjective: Patient offers no complaints regarding R shoulder upon arrival to session        Objective: See treatment diary below  Precautions: HTN, Right SLAP and posterior labrum repair   (protocol attached to chart and scanned into media      Daily Treatment Diary      Manual  1/2 1/4 1/8 1/11 1/15 1/22 1/25 1/29 12/21 12/28   Shoulder flexion PROM within protocol limits flexion as tolerated 4' 4' TK 4' 4' 4' 6 TK 4' CF TK   Scaption PROM 3' 3' TK 3' 3' 4' 2' TK 3' CF TK   ER PROM 3' & IR 3' TK 3' 3' 3' 3' TK 3' CF TK    Grade I and II [address] oscillations                   TK                                Exercise Diary  1/2 1/4 1/8 1/11 1/15 1/22 1/25 1/29 12/21 12/28   UBE 3'f/3'b NV   2'f/2'b 3'f/3'b 3'f/3'b 3'f/3'b 3'f/3'b 3/3  NP   Body blade 30"x3 30"x3 30"x3 30"x3 30"x3    30x3 30"x3 30"x3 30" x 3 30"x3 ea    FRO 2x10 2x10 3x10 3x10 3x10 3x10 3x10 3x10 2 x10  2 x 10    Tband ER/IR isometric walkout BTB 2x10 BTB 2x10 BTB 2x10 isotonic 3x10 RTB BTB 2x10 BTB 2x10- GTB 2x10 GTB 2x10 GTB   2 x10  GTB   2 x10    Pulley's 3 min 5' 5 min 5 min   3 min 5 min 3 min 3 mins   5'    sidelying ER to neutral 3# 3x10 3# 3x10 3# x20 3# x20 4# x20 4# x20 3# 3x10 4# 2x10 3# 3 x10   2# 30x   standing cane flexion 3# x20 (DB) 3# x20 3# x20 (DB) 2# x20 (DB) 3# 3x10 4# (DB) x20 3# x20 4# 2x10 3 # x 20   1# 20x   Tband rows BTB 3x10 Blk 2x10 GTB x30 BTB x20 blk x20 blk x20 btb x20 Blk x20 BTB x20   GTB 20x    FTW Foam roll 2x10 Foam roll 2x10 Foam roll x30 Foam roll x30 Foam RTB 2x10 Foam rtb 2x10 Foam roll x30 Foam roll x30 Foam roll x 30   foam roll 20x   Prone I T  3# 2x10 3# 2x10 3# 2x10 & Y 3# 2x10 3# & Y 2x10 4# & Y 2x10 3# 2x10 4# 2x10 & Y 2# 3 x10 & Y 1# 2x10   Wall ball circles Red x30 ea Red x30 ea Red x30 ea Red x30 ea Red x30 ea Red x30 ea Red x30 Red x30 Red x 30  Red 20x ea    SL ABD 3# 2x10 3# x30 3# 2x10 3# 2x10 4# 2x10 4# x20 3# 3x10 4# 2x10 3# 3 x10  1# 2x10   Eccentric lowering 1# x10 1# x10  1# x10  1 5# x10 1 5# x10 1 5# x10 1/2 # x10 1 5# x10 1/2# x 10  1/2# x 10    IR cane stretch BTB 10"x10 10"x10 10"x10  10"x10 10"x10 10"x10  10"x10 10"x10 w/ strap       Wall clock           rtb x10 rtb x10 Rtb x10           Assessment: Patient continues to have R shoulder IR restrictions and is challenged with behind the back motions  Consider progressing resistance nv as able  Pt would benefit from continued PT in order to improve behind the back IR ROM in R shoulder and strength in order to lift objects onto shelf at home  Plan: Continue per plan of care

## 2019-02-01 ENCOUNTER — OFFICE VISIT (OUTPATIENT)
Dept: PHYSICAL THERAPY | Facility: CLINIC | Age: 54
End: 2019-02-01
Payer: COMMERCIAL

## 2019-02-01 DIAGNOSIS — M75.41 IMPINGEMENT SYNDROME OF RIGHT SHOULDER: ICD-10-CM

## 2019-02-01 DIAGNOSIS — S43.431D SUPERIOR GLENOID LABRUM LESION OF RIGHT SHOULDER, SUBSEQUENT ENCOUNTER: ICD-10-CM

## 2019-02-01 DIAGNOSIS — M25.511 CHRONIC RIGHT SHOULDER PAIN: ICD-10-CM

## 2019-02-01 DIAGNOSIS — G89.29 CHRONIC RIGHT SHOULDER PAIN: ICD-10-CM

## 2019-02-01 DIAGNOSIS — Z47.89 AFTERCARE FOLLOWING SURGERY OF THE MUSCULOSKELETAL SYSTEM: Primary | ICD-10-CM

## 2019-02-01 DIAGNOSIS — M25.511 RIGHT SHOULDER PAIN, UNSPECIFIED CHRONICITY: ICD-10-CM

## 2019-02-01 PROCEDURE — 97112 NEUROMUSCULAR REEDUCATION: CPT

## 2019-02-01 PROCEDURE — 97140 MANUAL THERAPY 1/> REGIONS: CPT

## 2019-02-01 PROCEDURE — 97110 THERAPEUTIC EXERCISES: CPT

## 2019-02-01 NOTE — PROGRESS NOTES
Daily Note     Today's date: 2019  Patient name: Alina Kelley  : 1965  MRN: [id number]  Referring provider: Nathalie Escamilla PA-C  Dx:   Encounter Diagnosis     ICD-10-CM    1  Aftercare following surgery of the musculoskeletal system Z47 89    2  Superior glenoid labrum lesion of right shoulder, subsequent encounter S43 431D    3  Right shoulder pain, unspecified chronicity M25 511    4  Chronic right shoulder pain M25 511     G89 29    5  Impingement syndrome of right shoulder M75 41                   Subjective: Patient offers no complaints regarding R shoulder        Objective: See treatment diary below  Precautions: HTN, Right SLAP and posterior labrum repair   (protocol attached to chart and scanned into media      Daily Treatment Diary      Manual  1/2 1/4 1/8 1/11 1/15 1/22 1/25 1/29 2/1 12/28   Shoulder flexion PROM within protocol limits flexion as tolerated 4' 4' TK 4' 4' 4' 6 TK 4' 4' TK   Scaption PROM 3' 3' TK 3' 3' 4' 2' TK 3' 3' TK   ER PROM 3' & IR 3' TK 3' 3' 3' 3' TK 3' 3' TK    Grade I and II [address] oscillations                  TK                               Exercise Diary  1/2 1/4 1/8 1/11 1/15 1/22 1/25 1/29 2/1 12/28   UBE 3'f/3'b NV   2'f/2'b 3'f/3'b 3'f/3'b 3'f/3'b 3'f/3'b 3'f/3'b  NP   Body blade 30"x3 30"x3 30"x3 30"x3 30"x3    30x3 30"x3 30"x3 30"x3 30"x3 ea    FRO 2x10 2x10 3x10 3x10 3x10 3x10 3x10 3x10 3x10 2 x 10    Tband ER/IR isometric walkout BTB 2x10 BTB 2x10 BTB 2x10 isotonic 3x10 RTB BTB 2x10 BTB 2x10- GTB 2x10 GTB 2x10 BTB 2x10 GTB   2 x10    Pulley's 3 min 5' 5 min 5 min   3 min 5 min 3 min 5 min  5'    sidelying ER to neutral 3# 3x10 3# 3x10 3# x20 3# x20 4# x20 4# x20 3# 3x10 4# 2x10 4# 3x10  2# 30x   standing cane flexion 3# x20 (DB) 3# x20 3# x20 (DB) 2# x20 (DB) 3# 3x10 4# (DB) x20 3# x20 4# 2x10 4# 3x10  1# 20x   Tband rows BTB 3x10 Blk 2x10 GTB x30 BTB x20 blk x20 blk x20 btb x20 Blk x20 blk x20  GTB 20x    FTW Foam roll 2x10 Foam roll 2x10 Foam roll x30 Foam roll x30 Foam RTB 2x10 Foam rtb 2x10 Foam roll x30 Foam roll x30 Foam roll x30  foam roll 20x   Prone I T  3# 2x10 3# 2x10 3# 2x10 & Y 3# 2x10 3# & Y 2x10 4# & Y 2x10 3# 2x10 4# 2x10 & Y 4# 2x10 & Y 1# 2x10   Wall ball circles Red x30 ea Red x30 ea Red x30 ea Red x30 ea Red x30 ea Red x30 ea Red x30 Red x30 Red x30 Red 20x ea    SL ABD 3# 2x10 3# x30 3# 2x10 3# 2x10 4# 2x10 4# x20 3# 3x10 4# 2x10 4# 3x10 1# 2x10   Eccentric lowering 1# x10 1# x10  1# x10  1 5# x10 1 5# x10 1 5# x10 1/2 # x10 1 5# x10 1 5# x10 1/2# x 10    IR cane stretch BTB 10"x10 10"x10 10"x10  10"x10 10"x10 10"x10  10"x10 10"x10 w/ strap 10"x10 w/ strap     Wall clock           rtb x10 rtb x10 Rtb x10 rtb x10         Assessment: IR remains limited with pain at end range  Continues to progress well with strengthening, demonstrating no difficulties with current resistance level  Consider progressing resistance nv  Pt would benefit from continued PT in order to improve R shoulder IR ROM  Plan: Continue per plan of care

## 2019-02-05 ENCOUNTER — OFFICE VISIT (OUTPATIENT)
Dept: PHYSICAL THERAPY | Facility: CLINIC | Age: 54
End: 2019-02-05
Payer: COMMERCIAL

## 2019-02-05 DIAGNOSIS — M25.511 CHRONIC RIGHT SHOULDER PAIN: ICD-10-CM

## 2019-02-05 DIAGNOSIS — Z47.89 AFTERCARE FOLLOWING SURGERY OF THE MUSCULOSKELETAL SYSTEM: Primary | ICD-10-CM

## 2019-02-05 DIAGNOSIS — G89.29 CHRONIC RIGHT SHOULDER PAIN: ICD-10-CM

## 2019-02-05 DIAGNOSIS — M25.511 RIGHT SHOULDER PAIN, UNSPECIFIED CHRONICITY: ICD-10-CM

## 2019-02-05 DIAGNOSIS — M75.41 IMPINGEMENT SYNDROME OF RIGHT SHOULDER: ICD-10-CM

## 2019-02-05 DIAGNOSIS — S43.431D SUPERIOR GLENOID LABRUM LESION OF RIGHT SHOULDER, SUBSEQUENT ENCOUNTER: ICD-10-CM

## 2019-02-05 PROCEDURE — 97112 NEUROMUSCULAR REEDUCATION: CPT | Performed by: PHYSICAL THERAPIST

## 2019-02-05 PROCEDURE — 97140 MANUAL THERAPY 1/> REGIONS: CPT | Performed by: PHYSICAL THERAPIST

## 2019-02-05 PROCEDURE — 97110 THERAPEUTIC EXERCISES: CPT | Performed by: PHYSICAL THERAPIST

## 2019-02-05 NOTE — PROGRESS NOTES
Daily Note     Today's date: 2019  Patient name: Oleg Ordoñez  : 1965  MRN: [id number]  Referring provider: Patricio Fried PA-C  Dx:   Encounter Diagnosis     ICD-10-CM    1  Aftercare following surgery of the musculoskeletal system Z47 89    2  Superior glenoid labrum lesion of right shoulder, subsequent encounter S43 431D    3  Right shoulder pain, unspecified chronicity M25 511    4  Chronic right shoulder pain M25 511     G89 29    5   Impingement syndrome of right shoulder M75 41        Start Time: 730  Stop Time: 824  Total time in clinic (min): 54 minutes    Subjective: Patient reports that his shoulder has been feeling good       Objective: See treatment diary below  Precautions: HTN, Right SLAP and posterior labrum repair   (protocol attached to chart and scanned into media      Daily Treatment Diary      Manual  1/2 1/4 1/8 1/11 1/15 1/22 1/25 1/29 2/1 2/5   Shoulder flexion PROM within protocol limits flexion as tolerated 4' 4' TK 4' 4' 4' 6 TK 4' 4' TK   Scaption PROM 3' 3' TK 3' 3' 4' 2' TK 3' 3'    ER PROM 3' & IR 3' TK 3' 3' 3' 3' TK 3' 3'     Grade I and II [address] oscillations                    TK                                 Exercise Diary  1/2 1/4 1/8 1/11 1/15 1/22 1/25 1/29 2/1 2/5   UBE 3'f/3'b NV   2'f/2'b 3'f/3'b 3'f/3'b 3'f/3'b 3'f/3'b 3'f/3'b  NP   Body blade 30"x3 30"x3 30"x3 30"x3 30"x3    30x3 30"x3 30"x3 30"x3 30"x3 ea    FRO 2x10 2x10 3x10 3x10 3x10 3x10 3x10 3x10 3x10 2 x 10    Tband ER/IR isometric walkout BTB 2x10 BTB 2x10 BTB 2x10 isotonic 3x10 RTB BTB 2x10 BTB 2x10- GTB 2x10 GTB 2x10 BTB 2x10 GTB   2 x10    Pulley's 3 min 5' 5 min 5 min   3 min 5 min 3 min 5 min  5'    sidelying ER to neutral 3# 3x10 3# 3x10 3# x20 3# x20 4# x20 4# x20 3# 3x10 4# 2x10 4# 3x10   4# 3x10   standing cane flexion 3# x20 (DB) 3# x20 3# x20 (DB) 2# x20 (DB) 3# 3x10 4# (DB) x20 3# x20 4# 2x10 4# 3x10  4# 3x10   Tband rows BTB 3x10 Blk 2x10 GTB x30 BTB x20 blk x20 blk x20 btb x20 Blk x20 blk x20 blk x20    FTW Foam roll 2x10 Foam roll 2x10 Foam roll x30 Foam roll x30 Foam RTB 2x10 Foam rtb 2x10 Foam roll x30 Foam roll x30 Foam roll x30  foam roll 20x   Prone I T  3# 2x10 3# 2x10 3# 2x10 & Y 3# 2x10 3# & Y 2x10 4# & Y 2x10 3# 2x10 4# 2x10 & Y 4# 2x10 & Y 4# 2x10 & Y   Wall ball circles Red x30 ea Red x30 ea Red x30 ea Red x30 ea Red x30 ea Red x30 ea Red x30 Red x30 Red x30 Red 20x ea    SL ABD 3# 2x10 3# x30 3# 2x10 3# 2x10 4# 2x10 4# x20 3# 3x10 4# 2x10 4# 3x10 4# 3x10   Eccentric lowering 1# x10 1# x10  1# x10  1 5# x10 1 5# x10 1 5# x10 1/2 # x10 1 5# x10 1 5# x10 1 5# x10   IR cane stretch BTB 10"x10 10"x10 10"x10  10"x10 10"x10 10"x10  10"x10 10"x10 w/ strap 10"x10 w/ strap  10"x10 w/ strap   famer's carry          10# 5 laps    Wall clock           rtb x10 rtb x10 Rtb x10 rtb x10  rtb x10         Assessment: Patient tolerated addition of carrying activity well without complaints of pain  Patient demonstrated improved behind the back internal rotation as he was able to reach T11 during IR strap stretch  Patient noted fatigue in shoulder following treatment session  He will continue to benefit from PT in order to continue to promote right shoulder ROM, strength, and endurance  Plan: Continue per plan of care

## 2019-02-08 ENCOUNTER — OFFICE VISIT (OUTPATIENT)
Dept: PHYSICAL THERAPY | Facility: CLINIC | Age: 54
End: 2019-02-08
Payer: COMMERCIAL

## 2019-02-08 DIAGNOSIS — G89.29 CHRONIC RIGHT SHOULDER PAIN: ICD-10-CM

## 2019-02-08 DIAGNOSIS — M25.511 RIGHT SHOULDER PAIN, UNSPECIFIED CHRONICITY: ICD-10-CM

## 2019-02-08 DIAGNOSIS — M75.41 IMPINGEMENT SYNDROME OF RIGHT SHOULDER: ICD-10-CM

## 2019-02-08 DIAGNOSIS — S43.431D SUPERIOR GLENOID LABRUM LESION OF RIGHT SHOULDER, SUBSEQUENT ENCOUNTER: ICD-10-CM

## 2019-02-08 DIAGNOSIS — M25.511 CHRONIC RIGHT SHOULDER PAIN: ICD-10-CM

## 2019-02-08 DIAGNOSIS — Z47.89 AFTERCARE FOLLOWING SURGERY OF THE MUSCULOSKELETAL SYSTEM: Primary | ICD-10-CM

## 2019-02-08 PROCEDURE — 97110 THERAPEUTIC EXERCISES: CPT

## 2019-02-08 PROCEDURE — 97112 NEUROMUSCULAR REEDUCATION: CPT

## 2019-02-08 PROCEDURE — 97140 MANUAL THERAPY 1/> REGIONS: CPT

## 2019-02-08 NOTE — PROGRESS NOTES
Daily Note     Today's date: 2019  Patient name: Luciano Sweet  : 1965  MRN: [id number]  Referring provider: Jonathon Hermosillo PA-C  Dx:   Encounter Diagnosis     ICD-10-CM    1  Aftercare following surgery of the musculoskeletal system Z47 89    2  Superior glenoid labrum lesion of right shoulder, subsequent encounter S43 431D    3  Right shoulder pain, unspecified chronicity M25 511    4  Chronic right shoulder pain M25 511     G89 29    5  Impingement syndrome of right shoulder M75 41                   Subjective: Patient reports he has not experienced R shoulder pain in a few weeks  Patient states he has been doing 5 push ups in FWB position each day with minimal difficulty          Objective: See treatment diary below  Precautions: HTN, Right SLAP and posterior labrum repair   (protocol attached to chart and scanned into media      Daily Treatment Diary      Manual  2/8 1/4 1/8 1/11 1/15 1/22 1/25 1/29 2/1 2/5   Shoulder flexion PROM within protocol limits flexion as tolerated 4' 4' TK 4' 4' 4' 6 TK 4' 4' TK   Scaption PROM 3' 3' TK 3' 3' 4' 2' TK 3' 3'    ER PROM 4' 3' TK 3' 3' 3' 3' TK 3' 3'     Grade I and II [facility] oscillations                   TK                                Exercise Diary  2/8 1/4 1/8 1/11 1/15 1/22 1/25 1/29 2/1 2/5   UBE 3'f/3'b NV   2'f/2'b 3'f/3'b 3'f/3'b 3'f/3'b 3'f/3'b 3'f/3'b  NP   Body blade 30"x3 30"x3 30"x3 30"x3 30"x3    30x3 30"x3 30"x3 30"x3 30"x3 ea    FRO 2x10 2x10 3x10 3x10 3x10 3x10 3x10 3x10 3x10 2 x 10    Tband ER/IR isometric walkout isotonic btb 2x10 BTB 2x10 BTB 2x10 isotonic 3x10 RTB BTB 2x10 BTB 2x10- GTB 2x10 GTB 2x10 BTB 2x10 GTB   2 x10    Pulley's 3' 5' 5 min 5 min   3 min 5 min 3 min 5 min  5'    sidelying ER to neutral 5# 2x10 3# 3x10 3# x20 3# x20 4# x20 4# x20 3# 3x10 4# 2x10 4# 3x10   4# 3x10   standing cane flexion 5# 2x10 3# x20 3# x20 (DB) 2# x20 (DB) 3# 3x10 4# (DB) x20 3# x20 4# 2x10 4# 3x10  4# 3x10   Tband rows blk x20 Blk 2x10 GTB x30 BTB x20 blk x20 blk x20 btb x20 Blk x20 blk x20 blk x20    FTW Foam roll x20 Foam roll 2x10 Foam roll x30 Foam roll x30 Foam RTB 2x10 Foam rtb 2x10 Foam roll x30 Foam roll x30 Foam roll x30  foam roll 20x   Prone I T  5# 2x10 & Y 3# 2x10 3# 2x10 & Y 3# 2x10 3# & Y 2x10 4# & Y 2x10 3# 2x10 4# 2x10 & Y 4# 2x10 & Y 4# 2x10 & Y   Wall ball circles Red x20 ea Red x30 ea Red x30 ea Red x30 ea Red x30 ea Red x30 ea Red x30 Red x30 Red x30 Red 20x ea    SL ABD 5# 2x10 3# x30 3# 2x10 3# 2x10 4# 2x10 4# x20 3# 3x10 4# 2x10 4# 3x10 4# 3x10   Eccentric lowering 2# x10 1# x10  1# x10  1 5# x10 1 5# x10 1 5# x10 1/2 # x10 1 5# x10 1 5# x10 1 5# x10   IR cane stretch BTB 10"x10 w/ strap 10"x10 10"x10  10"x10 10"x10 10"x10  10"x10 10"x10 w/ strap 10"x10 w/ strap  10"x10 w/ strap   farmer's carry 10# 5 laps         10# 5 laps    Wall clock gtb x10         rtb x10 rtb x10 Rtb x10 rtb x10  rtb x10       Assessment: Patient challenged with increased resistance secondary to weakness  Able to perform farmer's carry without complaints of pain or discomfort  P/AROM R GH IR is lacking  Patient would benefit from continued PT in order to improve R GH IR ROM and strength for improved function during ADLs  Plan: Continue per plan of care

## 2019-02-12 ENCOUNTER — OFFICE VISIT (OUTPATIENT)
Dept: PHYSICAL THERAPY | Facility: CLINIC | Age: 54
End: 2019-02-12
Payer: COMMERCIAL

## 2019-02-12 DIAGNOSIS — M75.41 IMPINGEMENT SYNDROME OF RIGHT SHOULDER: ICD-10-CM

## 2019-02-12 DIAGNOSIS — Z47.89 AFTERCARE FOLLOWING SURGERY OF THE MUSCULOSKELETAL SYSTEM: Primary | ICD-10-CM

## 2019-02-12 DIAGNOSIS — M25.511 CHRONIC RIGHT SHOULDER PAIN: ICD-10-CM

## 2019-02-12 DIAGNOSIS — S43.431D SUPERIOR GLENOID LABRUM LESION OF RIGHT SHOULDER, SUBSEQUENT ENCOUNTER: ICD-10-CM

## 2019-02-12 DIAGNOSIS — M25.511 RIGHT SHOULDER PAIN, UNSPECIFIED CHRONICITY: ICD-10-CM

## 2019-02-12 DIAGNOSIS — G89.29 CHRONIC RIGHT SHOULDER PAIN: ICD-10-CM

## 2019-02-12 PROCEDURE — 97110 THERAPEUTIC EXERCISES: CPT | Performed by: PHYSICAL THERAPIST

## 2019-02-12 PROCEDURE — 97112 NEUROMUSCULAR REEDUCATION: CPT | Performed by: PHYSICAL THERAPIST

## 2019-02-12 PROCEDURE — 97140 MANUAL THERAPY 1/> REGIONS: CPT | Performed by: PHYSICAL THERAPIST

## 2019-02-12 NOTE — PROGRESS NOTES
Daily Note     Today's date: 2019  Patient name: Enrique Lopez  : 1965  MRN: [id number]  Referring provider: Speedy Vasquez PA-C  Dx:   Encounter Diagnosis     ICD-10-CM    1  Aftercare following surgery of the musculoskeletal system Z47 89    2  Superior glenoid labrum lesion of right shoulder, subsequent encounter S43 431D    3  Right shoulder pain, unspecified chronicity M25 511    4  Chronic right shoulder pain M25 511     G89 29    5  Impingement syndrome of right shoulder M75 41        Start Time: 730  Stop Time: 812  Total time in clinic (min): 42 minutes    Subjective: Patient reports that he has been feeling good  He states that he has been able to exercise at home  He reports that he is not restricted with activity         Objective: See treatment diary below  Precautions: HTN, Right SLAP and posterior labrum repair   (protocol attached to chart and scanned into media      Daily Treatment Diary      Manual  2/8 2/12 1/8 1/11 1/15 1/22 1/25 1/29 2/1 2/5   Shoulder flexion PROM within protocol limits flexion as tolerated 4' TK TK 4' 4' 4' 6 TK 4' 4' TK   Scaption PROM 3' TK TK 3' 3' 4' 2' TK 3' 3'     ER PROM 4' TK TK 3' 3' 3' 3' TK 3' 3'      Grade I and II [address] oscillations                    TK                                 Exercise Diary  2/8 2/11 1/8 1/11 1/15 1/22 1/25 1/29 2/1 2/5   UBE 3'f/3'b 3'f/3'b   2'f/2'b 3'f/3'b 3'f/3'b 3'f/3'b 3'f/3'b 3'f/3'b  NP   Body blade 30"x3 30"x3 30"x3 30"x3 30"x3    30x3 30"x3 30"x3 30"x3 30"x3 ea    FRO 2x10 2x10 3x10 3x10 3x10 3x10 3x10 3x10 3x10 2 x 10    Tband ER/IR isometric walkout isotonic btb 2x10 BTB 2x10 BTB 2x10 isotonic 3x10 RTB BTB 2x10 BTB 2x10- GTB 2x10 GTB 2x10 BTB 2x10 GTB   2 x10    Pulley's 3' 5' 5 min 5 min   3 min 5 min 3 min 5 min  5'    sidelying ER to neutral 5# 2x10 5# 2x10 3# x20 3# x20 4# x20 4# x20 3# 3x10 4# 2x10 4# 3x10   4# 3x10   standing cane flexion 5# 2x10 5# 2x10 3# x20 (DB) 2# x20 (DB) 3# 3x10 4# (DB) x20 3# x20 4# 2x10 4# 3x10  4# 3x10   Tband rows blk x20 Blk 2x10 GTB x30 BTB x20 blk x20 blk x20 btb x20 Blk x20 blk x20 blk x20    FTW Foam roll x20 Foam roll 2x10 Foam roll x30 Foam roll x30 Foam RTB 2x10 Foam rtb 2x10 Foam roll x30 Foam roll x30 Foam roll x30  foam roll 20x   Prone I T  5# 2x10 & Y 5# 2x1 3# 2x10 & Y 3# 2x10 3# & Y 2x10 4# & Y 2x10 3# 2x10 4# 2x10 & Y 4# 2x10 & Y 4# 2x10 & Y   Wall ball circles Red x20 ea Red x30 ea Red x30 ea Red x30 ea Red x30 ea Red x30 ea Red x30 Red x30 Red x30 Red 20x ea    SL ABD 5# 2x10 3# x30 3# 2x10 3# 2x10 4# 2x10 4# x20 3# 3x10 4# 2x10 4# 3x10 4# 3x10   Eccentric lowering 2# x10 2# x10  1# x10  1 5# x10 1 5# x10 1 5# x10 1/2 # x10 1 5# x10 1 5# x10 1 5# x10   IR cane stretch BTB 10"x10 w/ strap 10"x10 10"x10  10"x10 10"x10 10"x10  10"x10 10"x10 w/ strap 10"x10 w/ strap  10"x10 w/ strap   farmer's carry 10# 5 laps  10# 5 laps               10# 5 laps    Wall clock gtb x10  gtb x10       rtb x10 rtb x10 Rtb x10 rtb x10  rtb x10           Assessment: Patient demonstrated full and pain free PROM during manual PROM  He continues to tolerate strengthening progressions well  He will continue to benefit from PT in order to promote normalized right shoulder strength  Plan: Continue per plan of care

## 2019-02-13 ENCOUNTER — OFFICE VISIT (OUTPATIENT)
Dept: FAMILY MEDICINE CLINIC | Facility: CLINIC | Age: 54
End: 2019-02-13
Payer: COMMERCIAL

## 2019-02-13 VITALS
HEART RATE: 70 BPM | SYSTOLIC BLOOD PRESSURE: 134 MMHG | RESPIRATION RATE: 16 BRPM | BODY MASS INDEX: 29.37 KG/M2 | WEIGHT: 193.8 LBS | OXYGEN SATURATION: 96 % | TEMPERATURE: 98.2 F | DIASTOLIC BLOOD PRESSURE: 88 MMHG | HEIGHT: 68 IN

## 2019-02-13 DIAGNOSIS — I10 HYPERTENSION, UNSPECIFIED TYPE: ICD-10-CM

## 2019-02-13 DIAGNOSIS — E66.3 OVERWEIGHT (BMI 25.0-29.9): ICD-10-CM

## 2019-02-13 DIAGNOSIS — Z00.00 PHYSICAL EXAM, ANNUAL: Primary | ICD-10-CM

## 2019-02-13 PROCEDURE — 99396 PREV VISIT EST AGE 40-64: CPT | Performed by: INTERNAL MEDICINE

## 2019-02-13 RX ORDER — METOPROLOL SUCCINATE 50 MG/1
50 TABLET, EXTENDED RELEASE ORAL DAILY
Qty: 90 TABLET | Refills: 1
Start: 2019-02-13 | End: 2019-04-03 | Stop reason: SDUPTHER

## 2019-02-13 NOTE — PATIENT INSTRUCTIONS
rto yearly for pe  Discussed rto for bp check q 6 months  He had some bradycardia recently but states he just stayed on full pill  He did not decrease to 1/2 tab    He is continuing to loose weight
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
## 2019-02-15 ENCOUNTER — OFFICE VISIT (OUTPATIENT)
Dept: PHYSICAL THERAPY | Facility: CLINIC | Age: 54
End: 2019-02-15
Payer: COMMERCIAL

## 2019-02-15 DIAGNOSIS — M25.511 CHRONIC RIGHT SHOULDER PAIN: ICD-10-CM

## 2019-02-15 DIAGNOSIS — Z47.89 AFTERCARE FOLLOWING SURGERY OF THE MUSCULOSKELETAL SYSTEM: Primary | ICD-10-CM

## 2019-02-15 DIAGNOSIS — G89.29 CHRONIC RIGHT SHOULDER PAIN: ICD-10-CM

## 2019-02-15 DIAGNOSIS — M75.41 IMPINGEMENT SYNDROME OF RIGHT SHOULDER: ICD-10-CM

## 2019-02-15 DIAGNOSIS — M25.511 RIGHT SHOULDER PAIN, UNSPECIFIED CHRONICITY: ICD-10-CM

## 2019-02-15 DIAGNOSIS — S43.431D SUPERIOR GLENOID LABRUM LESION OF RIGHT SHOULDER, SUBSEQUENT ENCOUNTER: ICD-10-CM

## 2019-02-15 PROCEDURE — 97110 THERAPEUTIC EXERCISES: CPT

## 2019-02-15 PROCEDURE — 97140 MANUAL THERAPY 1/> REGIONS: CPT

## 2019-02-15 PROCEDURE — 97112 NEUROMUSCULAR REEDUCATION: CPT

## 2019-02-15 NOTE — PROGRESS NOTES
Daily Note     Today's date: 2/15/2019  Patient name: Kelton Cortez  : 1965  MRN: [id number]  Referring provider: Brian Armando PA-C  Dx:   Encounter Diagnosis     ICD-10-CM    1  Aftercare following surgery of the musculoskeletal system Z47 89    2  Superior glenoid labrum lesion of right shoulder, subsequent encounter S43 431D    3  Right shoulder pain, unspecified chronicity M25 511    4  Chronic right shoulder pain M25 511     G89 29    5  Impingement syndrome of right shoulder M75 41                   Subjective: Patient states R shoulder continues to feel better overall      Objective: See treatment diary below  Precautions: HTN, Right SLAP and posterior labrum repair   (protocol attached to chart and scanned into media      Daily Treatment Diary      Manual  2/8 2/12 2/15 1/11 1/15 1/22 1/25 1/29 2/1 2/5   Shoulder flexion PROM within protocol limits flexion as tolerated 4' TK 4' 4' 4' 4' 6 TK 4' 4' TK   Scaption PROM 3' TK 4' 3' 3' 4' 2' TK 3' 3'     ER PROM 4' TK 4' 3' 3' 3' 3' TK 3' 3'      Grade I and II [facility] oscillations                   TK                                Exercise Diary  2/8 2/11 2/15 1/11 1/15 1/22 1/25 1/29 2/1 2/5   UBE 3'f/3'b 3'f/3'b 3'f/3'b 2'f/2'b 3'f/3'b 3'f/3'b 3'f/3'b 3'f/3'b 3'f/3'b  NP   Body blade 30"x3 30"x3 30"x3 30"x3 30"x3    30x3 30"x3 30"x3 30"x3 30"x3 ea    FRO 2x10 2x10 2x10 3x10 3x10 3x10 3x10 3x10 3x10 2 x 10    Tband ER/IR isometric walkout isotonic btb 2x10 BTB 2x10 btb 2x10 isotonic isotonic 3x10 RTB BTB 2x10 BTB 2x10- GTB 2x10 GTB 2x10 BTB 2x10 GTB   2 x10    Pulley's 3' 5' 3' 5 min   3 min 5 min 3 min 5 min  5'    sidelying ER to neutral 5# 2x10 5# 2x10 5# 2x10 3# x20 4# x20 4# x20 3# 3x10 4# 2x10 4# 3x10   4# 3x10   standing cane flexion 5# 2x10 5# 2x10 5# 2x10 2# x20 (DB) 3# 3x10 4# (DB) x20 3# x20 4# 2x10 4# 3x10  4# 3x10   Tband rows blk x20 Blk 2x10 blk 3x10 BTB x20 blk x20 blk x20 btb x20 Blk x20 blk x20 blk x20    FTW Foam roll x20 Foam roll 2x10 Foam roll 2x10 Foam roll x30 Foam RTB 2x10 Foam rtb 2x10 Foam roll x30 Foam roll x30 Foam roll x30  foam roll 20x   Prone I T  5# 2x10 & Y 5# 2x1 5# 2x10 & Y 3# 2x10 3# & Y 2x10 4# & Y 2x10 3# 2x10 4# 2x10 & Y 4# 2x10 & Y 4# 2x10 & Y   Wall ball circles Red x20 ea Red x30 ea Red x30 ea Red x30 ea Red x30 ea Red x30 ea Red x30 Red x30 Red x30 Red 20x ea    SL ABD 5# 2x10 3# x30 5# x30 3# 2x10 4# 2x10 4# x20 3# 3x10 4# 2x10 4# 3x10 4# 3x10   Eccentric lowering 2# x10 2# x10 2# x10 from window  1 5# x10 1 5# x10 1 5# x10 1/2 # x10 1 5# x10 1 5# x10 1 5# x10   IR cane stretch BTB 10"x10 w/ strap 10"x10 10"x10  10"x10 10"x10 10"x10  10"x10 10"x10 w/ strap 10"x10 w/ strap  10"x10 w/ strap   farmer's carry 10# 5 laps  10# 5 laps 10# 5 laps             10# 5 laps    Wall clock gtb x10  gtb x10 gtb x10     rtb x10 rtb x10 Rtb x10 rtb x10  rtb x10       Assessment: Patient remains challenged with current resistance program   Demonstrating good awareness of proper exercise form throughout session  No complaints of R shoulder pain/discomfort throughout session  Possible d/c nv  Plan: Continue per plan of care

## 2019-02-19 ENCOUNTER — EVALUATION (OUTPATIENT)
Dept: PHYSICAL THERAPY | Facility: CLINIC | Age: 54
End: 2019-02-19
Payer: COMMERCIAL

## 2019-02-19 DIAGNOSIS — Z47.89 AFTERCARE FOLLOWING SURGERY OF THE MUSCULOSKELETAL SYSTEM: Primary | ICD-10-CM

## 2019-02-19 DIAGNOSIS — M25.511 RIGHT SHOULDER PAIN, UNSPECIFIED CHRONICITY: ICD-10-CM

## 2019-02-19 DIAGNOSIS — M75.41 IMPINGEMENT SYNDROME OF RIGHT SHOULDER: ICD-10-CM

## 2019-02-19 DIAGNOSIS — S43.431D SUPERIOR GLENOID LABRUM LESION OF RIGHT SHOULDER, SUBSEQUENT ENCOUNTER: ICD-10-CM

## 2019-02-19 DIAGNOSIS — G89.29 CHRONIC RIGHT SHOULDER PAIN: ICD-10-CM

## 2019-02-19 DIAGNOSIS — M25.511 CHRONIC RIGHT SHOULDER PAIN: ICD-10-CM

## 2019-02-19 PROCEDURE — 97140 MANUAL THERAPY 1/> REGIONS: CPT | Performed by: PHYSICAL THERAPIST

## 2019-02-19 PROCEDURE — 97110 THERAPEUTIC EXERCISES: CPT | Performed by: PHYSICAL THERAPIST

## 2019-02-19 NOTE — PROGRESS NOTES
PT Discharge    Today's date: 2019  Patient name: Shonna Rodriguez  : 1965  MRN: [id number]  Referring provider: Ari Briceno PA-C  Dx:   Encounter Diagnosis     ICD-10-CM    1  Aftercare following surgery of the musculoskeletal system Z47 89    2  Superior glenoid labrum lesion of right shoulder, subsequent encounter S43 431D    3  Right shoulder pain, unspecified chronicity M25 511    4  Chronic right shoulder pain M25 511     G89 29    5  Impingement syndrome of right shoulder M75 41        Start Time: 3843  Stop Time: 819  Total time in clinic (min): 42 minutes    Assessment  Assessment details: Patient reports has demonstrated improved right shoulder AROM, PROM and strength since initiating physical therapy  This has allowed a return to full ADL's and work actvities  At this time, patient has achieved their maximum functional benefit from skilled physical therapy services and will be discharged to their HEP  Patient is in agreement with the plan of care  As a result, patient is discharged from physical therapy      Impairments: abnormal or restricted ROM, activity intolerance, impaired physical strength, lacks appropriate home exercise program and pain with function  Understanding of Dx/Px/POC: good   Prognosis: good    Goals  Short term goals:  1) Patient will demonstrate decrease in pain by 20-50% in 4 weeks- ongoing  2) Patient will demonstrate right shoulder flexion PROM >110 degrees in 6 weeks- met  3) Patient will demonstrate right shoulder ER PROM >30  in 6 weeks- met  Long term goals:  1) Patient will demonstrate ability to reach over head with right hand in 10 weeks- partially met   2) Patient will demonstrate ability to dress independently in 8 weeks in 6 weeks- met  3) Patient will demonstrate ability to complete light house work with right shoulder in 10 weeks-met  4) Patient will demonstrate independence in HEP 6 weeks-ongoing        Plan  Patient would benefit from: skilled PT  Planned modality interventions: cryotherapy  Planned therapy interventions: flexibility, graded exercise, home exercise program, therapeutic exercise, strengthening, stretching, patient education, neuromuscular re-education, manual therapy, joint mobilization, IADL retraining, functional ROM exercises and activity modification  Frequency: 2x week  Duration in weeks: 6  Treatment plan discussed with: patient        Subjective Evaluation    History of Present Illness  Mechanism of injury: Patient reports that he has noticed an improvement since his last re-evaluation  Patient reports that he has noticed an improvement with his behind the back  He reports that he is able to reach over ehad without pain  He reports that he is able to complete chores around the  House without shoulder pain  Patient reports that does not have any difficult daily activities  He notes that he has been hesitant to throw a ball to his dog  He reports that he has returned to 93-95% prior level of functioning  Pain  Current pain ratin  At best pain ratin  At worst pain ratin  Location: Interior right shoulder- intermittent, nonvarying, deep 'stabbing"  Quality: knife-like    Social Support  Lives in: multiple-level home    Employment status: working (Patient works full time0 self employed  Patient reports that he mostly work on computer, phone, and drives)   Hand dominance: right      Diagnostic Tests  X-ray: normal        Objective     Active Range of Motion   Left Shoulder   Flexion: 156 degrees   Abduction: 160 degrees   External rotation BTH: T4   Internal rotation BTB: T9     Right Shoulder   Flexion: 168 degrees   Abduction: 176 degrees   External rotation BTH: T3   Internal rotation BTB: T10     Additional Active Range of Motion Details  AROM of right shoulder not assessed due to being contraindicated per post operative protocol       Passive Range of Motion   Left Shoulder   Internal rotation 45°:  with pain    Right Shoulder   Flexion: 170 degrees   Abduction: 170 degrees   External rotation 45°:  85 degrees   Internal rotation 45°:  30 degrees     Strength/Myotome Testing     Left Shoulder     Planes of Motion   Abduction: 5   External rotation at 0°:  4+   Internal rotation at 0°:  5     Right Shoulder     Planes of Motion   Flexion: 4+   External rotation at 0°:  4+   Internal rotation at 0°:  4+              Objective: See treatment diary below       Daily Treatment Diary      Precautions: HTN, Right SLAP and posterior labrum repair  8/22 (protocol attached to chart and scanned into media      Daily Treatment Diary    Precautions: HTN, Right SLAP and posterior labrum repair  8/22 (protocol attached to chart and scanned into media      Daily Treatment Diary      [facility] 2/8 2/12 2/15 2/19 1/15 1/22 1/25 1/29 2/1 2/5   Shoulder flexion PROM within protocol limits flexion as tolerated 4' TK 4' TK 4' 4' 6 TK 4' 4' TK   Scaption PROM 3' TK 4' TK 3' 4' 2' TK 3' 3'     ER PROM 4' TK TK TK 3' 3' 3' TK 3' 3'      Grade I and II [facility] oscillations                    TK                                 Exercise Diary  2/8 2/11 2/19 1/11 1/15 1/22 1/25 1/29 2/1 2/5   UBE 3'f/3'b 3'f/3'b 3'f/3'b 2'f/2'b 3'f/3'b 3'f/3'b 3'f/3'b 3'f/3'b 3'f/3'b  NP   Body blade 30"x3 30"x3 30"x3 30"x3 30"x3    30x3 30"x3 30"x3 30"x3 30"x3 ea    FRO 2x10 2x10 2x10 3x10 3x10 3x10 3x10 3x10 3x10 2 x 10    Tband ER/IR isometric walkout isotonic btb 2x10 BTB 2x10 btb 2x10 isotonic isotonic 3x10 RTB BTB 2x10 BTB 2x10- GTB 2x10 GTB 2x10 BTB 2x10 GTB   2 x10    Pulley's 3' 5' 3' 5 min   3 min 5 min 3 min 5 min  5'    sidelying ER to neutral 5# 2x10 5# 2x10 5# 2x10 3# x20 4# x20 4# x20 3# 3x10 4# 2x10 4# 3x10   4# 3x10   standing cane flexion 5# 2x10 5# 2x10 5# 2x10 2# x20 (DB) 3# 3x10 4# (DB) x20 3# x20 4# 2x10 4# 3x10  4# 3x10   Tband rows blk x20 Blk 2x10 blk 3x10 BTB x20 blk x20 blk x20 btb x20 Blk x20 blk x20 blk x20    FTW Foam roll x20 Foam roll 2x10 Foam roll 2x10 Foam roll x30 Foam RTB 2x10 Foam rtb 2x10 Foam roll x30 Foam roll x30 Foam roll x30  foam roll 20x   Prone I T  5# 2x10 & Y 5# 2x1 5# 2x10 & Y 3# 2x10 3# & Y 2x10 4# & Y 2x10 3# 2x10 4# 2x10 & Y 4# 2x10 & Y 4# 2x10 & Y   Wall ball circles Red x20 ea Red x30 ea Red x30 ea Red x30 ea Red x30 ea Red x30 ea Red x30 Red x30 Red x30 Red 20x ea    SL ABD 5# 2x10 3# x30 5# x30 3# 2x10 4# 2x10 4# x20 3# 3x10 4# 2x10 4# 3x10 4# 3x10   Eccentric lowering 2# x10 2# x10 2# x10 from window  1 5# x10 1 5# x10 1 5# x10 1/2 # x10 1 5# x10 1 5# x10 1 5# x10   IR cane stretch BTB 10"x10 w/ strap 10"x10 10"x10  10"x10 10"x10 10"x10  10"x10 10"x10 w/ strap 10"x10 w/ strap  10"x10 w/ strap   farmer's carry 10# 5 laps  10# 5 laps 10# 5 laps             10# 5 laps    Wall clock gtb x10  gtb x10 gtb x10     rtb x10 rtb x10 Rtb x10 rtb x10  rtb x10

## 2019-02-22 ENCOUNTER — APPOINTMENT (OUTPATIENT)
Dept: PHYSICAL THERAPY | Facility: CLINIC | Age: 54
End: 2019-02-22
Payer: COMMERCIAL

## 2019-02-26 ENCOUNTER — APPOINTMENT (OUTPATIENT)
Dept: PHYSICAL THERAPY | Facility: CLINIC | Age: 54
End: 2019-02-26
Payer: COMMERCIAL

## 2019-04-03 DIAGNOSIS — I10 HYPERTENSION, UNSPECIFIED TYPE: ICD-10-CM

## 2019-04-04 RX ORDER — AMLODIPINE BESYLATE 10 MG/1
TABLET ORAL
Qty: 90 TABLET | Refills: 0 | Status: SHIPPED | OUTPATIENT
Start: 2019-04-04 | End: 2019-10-07 | Stop reason: SDUPTHER

## 2019-04-04 RX ORDER — METOPROLOL SUCCINATE 50 MG/1
25 TABLET, EXTENDED RELEASE ORAL DAILY
Qty: 45 TABLET | Refills: 0 | Status: SHIPPED | OUTPATIENT
Start: 2019-04-04 | End: 2019-10-07 | Stop reason: SDUPTHER

## 2019-04-04 RX ORDER — METOPROLOL SUCCINATE 50 MG/1
50 TABLET, EXTENDED RELEASE ORAL DAILY
Qty: 90 TABLET | Refills: 0
Start: 2019-04-04 | End: 2019-06-17

## 2019-04-04 RX ORDER — AMLODIPINE BESYLATE 10 MG/1
10 TABLET ORAL DAILY
Qty: 90 TABLET | Refills: 0 | Status: SHIPPED | OUTPATIENT
Start: 2019-04-04 | End: 2019-06-17

## 2019-04-04 RX ORDER — LISINOPRIL 20 MG/1
TABLET ORAL
Qty: 90 TABLET | Refills: 0 | Status: SHIPPED | OUTPATIENT
Start: 2019-04-04 | End: 2019-10-07 | Stop reason: SDUPTHER

## 2019-04-04 RX ORDER — LISINOPRIL 20 MG/1
20 TABLET ORAL DAILY
Qty: 90 TABLET | Refills: 0 | Status: SHIPPED | OUTPATIENT
Start: 2019-04-04 | End: 2019-06-17

## 2019-05-03 ENCOUNTER — OFFICE VISIT (OUTPATIENT)
Dept: FAMILY MEDICINE CLINIC | Facility: CLINIC | Age: 54
End: 2019-05-03
Payer: COMMERCIAL

## 2019-05-03 VITALS
HEART RATE: 54 BPM | WEIGHT: 189 LBS | SYSTOLIC BLOOD PRESSURE: 114 MMHG | OXYGEN SATURATION: 97 % | HEIGHT: 68 IN | RESPIRATION RATE: 16 BRPM | TEMPERATURE: 98 F | BODY MASS INDEX: 28.64 KG/M2 | DIASTOLIC BLOOD PRESSURE: 84 MMHG

## 2019-05-03 DIAGNOSIS — M62.830 LUMBAR PARASPINAL MUSCLE SPASM: Primary | ICD-10-CM

## 2019-05-03 PROCEDURE — 99213 OFFICE O/P EST LOW 20 MIN: CPT | Performed by: FAMILY MEDICINE

## 2019-05-03 RX ORDER — CYCLOBENZAPRINE HCL 10 MG
5-10 TABLET ORAL 3 TIMES DAILY PRN
Qty: 30 TABLET | Refills: 0 | Status: SHIPPED | OUTPATIENT
Start: 2019-05-03 | End: 2019-06-17

## 2019-05-03 RX ORDER — METHYLPREDNISOLONE 4 MG/1
TABLET ORAL
Qty: 21 EACH | Refills: 0 | Status: SHIPPED | OUTPATIENT
Start: 2019-05-03 | End: 2019-06-17

## 2019-05-09 NOTE — PROGRESS NOTES
2653: Anesthesia staff at patient's bedside administering anesthesia and monitoring patients vital signs throughout procedure. See anesthesia note. Patient tolerated procedure without problems. Abdomen soft and patient arousable and voices no complaints Report received from CRNA, see anesthesia note. Patient transported to endoscopy recovery area.  Endoscope was pre-cleaned at bedside immediately following procedure by Chanell Shah Daily Note     Today's date: 2018  Patient name: Selene Steiner  : 1965  MRN: [id number]  Referring provider: Chance Ridley MD  Dx:   Encounter Diagnosis     ICD-10-CM    1  Chronic right shoulder pain M25 511     G89 29    2  Impingement syndrome of right shoulder M75 41    3  Right shoulder pain, unspecified chronicity M25 511                   Subjective: Upon presentation patient repots compliance to HEP         Objective: See treatment diary below  Manual     Shoulder PROM 5min 8 min 8 min TK 5 min  5 min    TK  8 min   TK    modified median nerve glides   3 min 3 min 3 min  TK  3 min  3 min         TK    Cervical distraction retraction extension                       Cervical grade II and II cerivcal mobs             TK         ER MWM    TK          Manual Tractoin         TK  5 min                   Exercise Diary    8/1  6/15  6/20  6/22  6/26   Wall Walks 10x  NP  NP  NP  10x  10x  10x  10x  10x  10x    Pulley's   5 min 5 min  5 min  5 min  5 min  5 min    5 min    5 min  5 min  5 min    side lying ER to neutral   1# 2x10 2 x 10 3#  10x2 3#  10x2 3#  10x2  10x2 3#  10x2  10x2  1# 2x10   1# 2x10    FRO   2 x 10 10x 2  NP  10x2    2x10  2x10    2x10    Supine cane AAROM flexion  20x 2  X 10 1# 20x 1#  20x   30x 1#  20x 2#   20x  20x  20x  20x    Side lying abd to 90 degrees  1# 2x10 2 x 10 3# 2x10 3#  2x10 3#   2x10 3#  2x10 3#  2x10  2x10  1# 2x10  1# 2x10    Cervical Retractoin  2x10 2 x 10  10x 2  2x10   2x10  2x10   2x10  2x10  2x10   2x10    Cervical Retraction with OP  2x10 2 x 10  10x 2  2x10  2x10  2x10  2x10  2x10  2x10  2x10   Cervical Retraction Extension  2x10  2x10  2x10  2 x 10  2x10  2x10   2x10  2x10  2x10   2x10    Cerivcal Retraction Extension with rotation  2x10  2x10  2x10  2 x 10  2x10  2x10 2 x 10  2x10  2x10   2x10    Theraband ER/IR  YTB 2x10  RTB 2x10  RTB 2x10  RTB 2x10  RTB 2x10  GTB 2x10   YTB 2 x 10 YTB 2x10  YTB 2x10    Theraband Row  [id number]  RTB 2x10  RTB 2x10  RTB 2x10  RTB 2x10  GTB 2x10   YTB 2 x 10  YTB 2x10  YTB 2x10    No monies  YTB 2x10  RTB 2x10  RTB 2x10  RTB 2x10  RTB 2x10  GTB 2x10       YTB 2x10    T-band extensions      RTB 2x10  RTB 2x10  RTB 2x10  GTB 2x10            Bicep curls       5# 2 x 10  5# 2x10  5# 2x10            FTW       2 x 10  2x10 foam roll  2x10 foam roll                                                                                                                 Modalities  5/8  5/15  5/25  5/30  6/8             Cervical traction IM 8' 16-18# max 5# min  IM 10' 20# max- 5# min  IM 10' 20# max  5#  IM 10' max- 20# max min 5# M 10' max- 20# max min 5#                                                                   Assessment: Patient demonstrates decreased tightness and decreased pain levels at end ranges  Good tolerance to progression in TE, no complaints offered  Patient offers no complaints post session  Patient would benefit from continued therapy to increase R shoulder strength and reduce pain  Plan: Continue per plan of care

## 2019-06-17 ENCOUNTER — OFFICE VISIT (OUTPATIENT)
Dept: OBGYN CLINIC | Facility: MEDICAL CENTER | Age: 54
End: 2019-06-17
Payer: COMMERCIAL

## 2019-06-17 ENCOUNTER — APPOINTMENT (OUTPATIENT)
Dept: RADIOLOGY | Facility: MEDICAL CENTER | Age: 54
End: 2019-06-17
Payer: COMMERCIAL

## 2019-06-17 ENCOUNTER — TELEPHONE (OUTPATIENT)
Dept: OBGYN CLINIC | Facility: HOSPITAL | Age: 54
End: 2019-06-17

## 2019-06-17 VITALS
SYSTOLIC BLOOD PRESSURE: 149 MMHG | DIASTOLIC BLOOD PRESSURE: 99 MMHG | HEIGHT: 68 IN | BODY MASS INDEX: 28.7 KG/M2 | WEIGHT: 189.4 LBS | HEART RATE: 62 BPM

## 2019-06-17 DIAGNOSIS — M54.50 LUMBAR PAIN: ICD-10-CM

## 2019-06-17 DIAGNOSIS — M54.50 LUMBAR PAIN: Primary | ICD-10-CM

## 2019-06-17 DIAGNOSIS — M62.830 LUMBAR PARASPINAL MUSCLE SPASM: ICD-10-CM

## 2019-06-17 PROCEDURE — 72100 X-RAY EXAM L-S SPINE 2/3 VWS: CPT

## 2019-06-17 PROCEDURE — 99213 OFFICE O/P EST LOW 20 MIN: CPT | Performed by: FAMILY MEDICINE

## 2019-06-17 RX ORDER — MELOXICAM 15 MG/1
15 TABLET ORAL DAILY
Qty: 60 TABLET | Refills: 0 | Status: SHIPPED | OUTPATIENT
Start: 2019-06-17 | End: 2019-08-14

## 2019-06-20 ENCOUNTER — EVALUATION (OUTPATIENT)
Dept: PHYSICAL THERAPY | Facility: CLINIC | Age: 54
End: 2019-06-20
Payer: COMMERCIAL

## 2019-06-20 DIAGNOSIS — M54.50 ACUTE BILATERAL LOW BACK PAIN WITHOUT SCIATICA: Primary | ICD-10-CM

## 2019-06-20 PROCEDURE — 97161 PT EVAL LOW COMPLEX 20 MIN: CPT | Performed by: PHYSICAL THERAPIST

## 2019-06-20 PROCEDURE — 97140 MANUAL THERAPY 1/> REGIONS: CPT | Performed by: PHYSICAL THERAPIST

## 2019-06-20 PROCEDURE — 97112 NEUROMUSCULAR REEDUCATION: CPT | Performed by: PHYSICAL THERAPIST

## 2019-06-24 ENCOUNTER — OFFICE VISIT (OUTPATIENT)
Dept: PHYSICAL THERAPY | Facility: CLINIC | Age: 54
End: 2019-06-24
Payer: COMMERCIAL

## 2019-06-24 DIAGNOSIS — M54.50 ACUTE BILATERAL LOW BACK PAIN WITHOUT SCIATICA: Primary | ICD-10-CM

## 2019-06-24 PROCEDURE — 97140 MANUAL THERAPY 1/> REGIONS: CPT

## 2019-06-24 PROCEDURE — 97035 APP MDLTY 1+ULTRASOUND EA 15: CPT

## 2019-06-24 PROCEDURE — 97014 ELECTRIC STIMULATION THERAPY: CPT

## 2019-06-24 PROCEDURE — 97110 THERAPEUTIC EXERCISES: CPT

## 2019-06-27 ENCOUNTER — OFFICE VISIT (OUTPATIENT)
Dept: PHYSICAL THERAPY | Facility: CLINIC | Age: 54
End: 2019-06-27
Payer: COMMERCIAL

## 2019-06-27 DIAGNOSIS — M54.50 ACUTE BILATERAL LOW BACK PAIN WITHOUT SCIATICA: Primary | ICD-10-CM

## 2019-06-27 PROCEDURE — 97110 THERAPEUTIC EXERCISES: CPT | Performed by: PHYSICAL THERAPIST

## 2019-06-27 PROCEDURE — 97112 NEUROMUSCULAR REEDUCATION: CPT | Performed by: PHYSICAL THERAPIST

## 2019-07-01 ENCOUNTER — OFFICE VISIT (OUTPATIENT)
Dept: PHYSICAL THERAPY | Facility: CLINIC | Age: 54
End: 2019-07-01
Payer: COMMERCIAL

## 2019-07-01 DIAGNOSIS — M54.50 ACUTE BILATERAL LOW BACK PAIN WITHOUT SCIATICA: Primary | ICD-10-CM

## 2019-07-01 PROCEDURE — 97112 NEUROMUSCULAR REEDUCATION: CPT

## 2019-07-01 PROCEDURE — 97110 THERAPEUTIC EXERCISES: CPT

## 2019-07-01 NOTE — PROGRESS NOTES
Daily Note     Today's date: 2019  Patient name: Jade Zapien  : 1965  MRN: [id number]  Referring provider: Parmjit Leigh DO  Dx:   Encounter Diagnosis     ICD-10-CM    1  Acute bilateral low back pain without sciatica M54 5                   Subjective: Patient reported that his back is doing "so so" today having minimal discomfort  Notes that it is worse in the mornings but clears up as the day goes on  Objective: See treatment diary below      Assessment: Tolerated treatment well  Continued to focus on core strengthening this session with good tolerance  Pt felt good to end today with no increased symptoms  Plan: Continue per plan of care        Precautions: No full rotation      Manual             Bria Morse 10 10'                                                                   Exercise Diary           HEP 15 10           bike   10 10         Calf str/HR   10 10         Circuit LE   15x2 15x2         Nerve glides             TG squats SL,DL   20x 20x         TRX sqats, rows   20x 20x         Planks 10 sec   10 10         LTR   20 20         Ball core   10x 10x         BS rows, lats   15x2 15x2         bosu lunges             bosu squats             elliptical                                                                                               Modalities             ice 10 '           stim  15           US  10

## 2019-07-03 ENCOUNTER — OFFICE VISIT (OUTPATIENT)
Dept: PHYSICAL THERAPY | Facility: CLINIC | Age: 54
End: 2019-07-03
Payer: COMMERCIAL

## 2019-07-03 DIAGNOSIS — M54.50 ACUTE BILATERAL LOW BACK PAIN WITHOUT SCIATICA: Primary | ICD-10-CM

## 2019-07-03 PROCEDURE — 97110 THERAPEUTIC EXERCISES: CPT | Performed by: PHYSICAL THERAPIST

## 2019-07-03 PROCEDURE — 97112 NEUROMUSCULAR REEDUCATION: CPT | Performed by: PHYSICAL THERAPIST

## 2019-07-03 NOTE — PROGRESS NOTES
Daily Note     Today's date: 7/3/2019  Patient name: Aiden Padilla  : 1965  MRN: [id number]  Referring provider: Ricardo Treviño DO  Dx:   Encounter Diagnosis     ICD-10-CM    1  Acute bilateral low back pain without sciatica M54 5        Start Time: 0900  Stop Time: 09  Total time in clinic (min): 56 minutes    Subjective: Patient reports that he is having some soreness in his lumbar spine this date  He reports that he always feels great upon leaving physical therapy  Objective: See treatment diary below  Precautions: No full rotation        Manual                     [place], ME 10 10'                                                                                                                         Exercise Diary  6/20 6/24 6/27 7/1  7/3             HEP 15 10                   bike     10 10  10             Calf str/HR     10 10  10             Circuit LE     15x2 15x2  15x2             Nerve glides                       TG squats SL,DL     20x 20x  20x             TRX sqats, rows     20x 20x  20x             Planks 10 sec     10 10  3             LTR     20 20  20             Ball core     10x 10x  10x             BS rows, lats     15x2 15x2  15x2             bosu lunges                       bosu squats                       elliptical                                                                                                                                                                             Modalities                     ice 10 '                   stim   15                   US   10                          Assessment: Patient tolerated treatment session well this visit  He completed less repetitions of planks this visit secondary to reporting right shoulder discomfort following last visit  He reported abolished lumbar spine pain following today's treatment session  Plan: Continue per plan of care

## 2019-07-08 ENCOUNTER — OFFICE VISIT (OUTPATIENT)
Dept: PHYSICAL THERAPY | Facility: CLINIC | Age: 54
End: 2019-07-08
Payer: COMMERCIAL

## 2019-07-08 DIAGNOSIS — M54.50 ACUTE BILATERAL LOW BACK PAIN WITHOUT SCIATICA: Primary | ICD-10-CM

## 2019-07-08 PROCEDURE — 97110 THERAPEUTIC EXERCISES: CPT | Performed by: PHYSICAL THERAPIST

## 2019-07-08 PROCEDURE — 97014 ELECTRIC STIMULATION THERAPY: CPT | Performed by: PHYSICAL THERAPIST

## 2019-07-08 PROCEDURE — 97035 APP MDLTY 1+ULTRASOUND EA 15: CPT | Performed by: PHYSICAL THERAPIST

## 2019-07-08 PROCEDURE — 97112 NEUROMUSCULAR REEDUCATION: CPT | Performed by: PHYSICAL THERAPIST

## 2019-07-08 NOTE — PROGRESS NOTES
Daily Note     Today's date: 2019  Patient name: Omaira López  : 1965  MRN: [id number]  Referring provider: Gael Aldana DO  Dx:   Encounter Diagnosis     ICD-10-CM    1  Acute bilateral low back pain without sciatica M54 5                   Subjective: Patient reports pain today due to rainy weather  Objective: See treatment diary below  Precautions: No full rotation        Manual                   [place], ME 10 10'                    ME     5'                                                                                               Exercise Diary  6/20 6/24 6/27 7/1  7/3  7/8           HEP 15 10                   bike     10 10  10  10'           Calf str/HR     10 10  10  10           Circuit LE     15x2 15x2  15x2  15x2           Nerve glides                       TG squats SL,DL     20x 20x  20x             TRX sqats, rows     20x 20x  20x             Planks 10 sec     10 10  3             LTR     20 20  20             Ball core     10x 10x  10x             BS rows, lats     15x2 15x2  15x2             bosu lunges                       bosu squats                       elliptical                                                                                                                                                                             Modalities                   ice 10 '                   stim   15  15                 US   10  10'                        Assessment: Patient tolerated treatment  Deferred some therex due to increased pain today  Muscle energy to correct lumbar spinal alignment  Plan: Continue per plan of care

## 2019-07-12 ENCOUNTER — OFFICE VISIT (OUTPATIENT)
Dept: PHYSICAL THERAPY | Facility: CLINIC | Age: 54
End: 2019-07-12
Payer: COMMERCIAL

## 2019-07-12 DIAGNOSIS — M54.50 ACUTE BILATERAL LOW BACK PAIN WITHOUT SCIATICA: Primary | ICD-10-CM

## 2019-07-12 PROCEDURE — 97014 ELECTRIC STIMULATION THERAPY: CPT

## 2019-07-12 PROCEDURE — 97110 THERAPEUTIC EXERCISES: CPT

## 2019-07-12 PROCEDURE — 97035 APP MDLTY 1+ULTRASOUND EA 15: CPT

## 2019-07-12 NOTE — PROGRESS NOTES
Daily Note     Today's date: 2019  Patient name: Alicia Rojas  : 1965  MRN: [id number]  Referring provider: Davina Aquino DO  Dx:   Encounter Diagnosis     ICD-10-CM    1  Acute bilateral low back pain without sciatica M54 5        Start Time: 0800  Stop Time: 09  Total time in clinic (min): 60 minutes    Subjective: Patient stated he drove a lot yesterday and has 7/10 pain in back today  Objective: See treatment diary below      Assessment: Tolerated treatment well  Patient exhibited good technique with therapeutic exercises  Patient tolerated exercises without increased pain, actually felt a little better post exercises  Modalities to LB to decrease pain and tightness  Plan: Continue per plan of care        Precautions: No full rotation      Manual              Bria Morse 10                                                                    Exercise Diary             HEP 15            bike  10'           Calf str/HR  10x           circuit  20x           Nerve glides             pulleys  30x                                                                                                                                                                                                     Modalities             ice 10            stim  20           US  10

## 2019-07-15 ENCOUNTER — OFFICE VISIT (OUTPATIENT)
Dept: PHYSICAL THERAPY | Facility: CLINIC | Age: 54
End: 2019-07-15
Payer: COMMERCIAL

## 2019-07-15 DIAGNOSIS — M54.50 ACUTE BILATERAL LOW BACK PAIN WITHOUT SCIATICA: Primary | ICD-10-CM

## 2019-07-15 PROCEDURE — 97110 THERAPEUTIC EXERCISES: CPT | Performed by: PHYSICAL THERAPIST

## 2019-07-15 PROCEDURE — 97112 NEUROMUSCULAR REEDUCATION: CPT | Performed by: PHYSICAL THERAPIST

## 2019-07-15 NOTE — PROGRESS NOTES
Daily Note     Today's date: 7/15/2019  Patient name: Jorge Collins  : 1965  MRN: [id number]  Referring provider: Simona Rosado DO  Dx:   Encounter Diagnosis     ICD-10-CM    1  Acute bilateral low back pain without sciatica M54 5                   Subjective: Patient stated he drove a lot yesterday and has 7/10 pain in back today  Objective: See treatment diary below      Assessment: Tolerated treatment well  Patient exhibited good technique with therapeutic exercises  Patient tolerated exercises without increased pain, actually felt a little better post exercises  Reviewed home stretches and muscle energy to improve hip alignment  Plan: Continue per plan of care        Precautions: No full rotation      Manual  6/20  7/15          Bria Morse 10                                                                    Exercise Diary  6/20 7/12 7/15 7         HEP 15            bike  10' 10'          Calf str/HR  10x 10x           circuit  20x 20          Nerve glides             pulleys  30x 30                                                                                                                                                                                                    Modalities             ice 10            stim  20           US  10

## 2019-07-19 ENCOUNTER — OFFICE VISIT (OUTPATIENT)
Dept: PHYSICAL THERAPY | Facility: CLINIC | Age: 54
End: 2019-07-19
Payer: COMMERCIAL

## 2019-07-19 DIAGNOSIS — M54.50 ACUTE BILATERAL LOW BACK PAIN WITHOUT SCIATICA: Primary | ICD-10-CM

## 2019-07-19 PROCEDURE — 97116 GAIT TRAINING THERAPY: CPT | Performed by: PHYSICAL THERAPIST

## 2019-07-19 PROCEDURE — 97110 THERAPEUTIC EXERCISES: CPT | Performed by: PHYSICAL THERAPIST

## 2019-07-19 PROCEDURE — 97112 NEUROMUSCULAR REEDUCATION: CPT | Performed by: PHYSICAL THERAPIST

## 2019-07-19 NOTE — LETTER
2019    Heather Eid DO  [address] [facility] [address]    Patient: Guadalupe Arana   YOB: 1965   Date of Visit: 2019     Encounter Diagnosis     ICD-10-CM    1  Acute bilateral low back pain without sciatica M54 5        Dear Dr Pagan Screws:    Please review the attached Plan of Care from [facility] recent visit  Please verify that you agree therapy should continue by signing the attached document and sending it back to our office  If you have any questions or concerns, please don't hesitate to call  Sincerely,    Naa Alvares, PT      Referring Provider:      I certify that I have read the below Plan of Care and certify the need for these services furnished under this plan of treatment while under my care  Heatherana Eid   [address] [facility] [address]  [place]          Progress Note    Today's date: 2019  Patient name: Guadalupe Arana  : 1965  MRN: [id number]  Referring provider: Shamir Peña DO  Dx:   Encounter Diagnosis     ICD-10-CM    1  Acute bilateral low back pain without sciatica M54 5            Assessment:  Patient is making progress toward goal of no pain with activities  He reports improving by 60%  His strength is improving and he is partially compliant with HEP  Patient can benefit from skilled PT to decrease pain and return to activities  Plan Continue PT 2x per weekn x 2 weeks  Goals of no pain with activities remain the same  Subjective: Patient stated he is feeling better today  Objective: See treatment diary below      Visit Tolerated treatment well  Patient exhibited good technique with therapeutic exercises  Patient tolerated exercises without increased pain, actually felt a little better post exercises  Added extra exercises on circuit for stabilizing muscles - hip add/VMO, glute med, glute maxt  Also added TM for gait training    VC's for decreased accessory movement of hips and body with normal gait  Also started ball core exercises  Precautions:       Manual  6/20  7/15          Bria Morse 10                                                                    Exercise Diary  6/20 7/12 7/15 7/19         HEP 15            bike  10' 10' 10'         Calf str/HR  10x 10x  10         circuit  20x 20 20x2  LE         Nerve glides             pulleys  30x 30          TM gt training    10'         Elliptical             Ball ext   5 sec    20         Ball posture 5 sec    20         Ball supine arms                                                                                                                                      Modalities  6/20 7/12           ice 10            stim  20           US  10

## 2019-07-19 NOTE — PROGRESS NOTES
Progress Note    Today's date: 2019  Patient name: Jorge Collins  : 1965  MRN: [id number]  Referring provider: Simona Rosado DO  Dx:   Encounter Diagnosis     ICD-10-CM    1  Acute bilateral low back pain without sciatica M54 5            Assessment:  Patient is making progress toward goal of no pain with activities  He reports improving by 60%  His strength is improving and he is partially compliant with HEP  Patient can benefit from skilled PT to decrease pain and return to activities  Plan Continue PT 2x per weekn x 2 weeks  Goals of no pain with activities remain the same  Subjective: Patient stated he is feeling better today  Objective: See treatment diary below      Visit Tolerated treatment well  Patient exhibited good technique with therapeutic exercises  Patient tolerated exercises without increased pain, actually felt a little better post exercises  Added extra exercises on circuit for stabilizing muscles - hip add/VMO, glute med, glute maxt  Also added TM for gait training  VC's for decreased accessory movement of hips and body with normal gait  Also started ball core exercises  Precautions:       Manual  6/20  7/15          Bria Morse 10                                                                    Exercise Diary  6/20 7/12 7/15 7/19         HEP 15            bike  10' 10' 10'         Calf str/HR  10x 10x  10         circuit  20x 20 20x2  LE         Nerve glides             pulleys  30x 30          TM gt training    10'         Elliptical             Ball ext   5 sec    20         Ball posture 5 sec    20         Ball supine arms                                                                                                                                      Modalities             ice 10            stim  20           US  10

## 2019-07-22 ENCOUNTER — OFFICE VISIT (OUTPATIENT)
Dept: PHYSICAL THERAPY | Facility: CLINIC | Age: 54
End: 2019-07-22
Payer: COMMERCIAL

## 2019-07-22 DIAGNOSIS — M54.50 ACUTE BILATERAL LOW BACK PAIN WITHOUT SCIATICA: Primary | ICD-10-CM

## 2019-07-22 PROCEDURE — 97116 GAIT TRAINING THERAPY: CPT

## 2019-07-22 PROCEDURE — 97112 NEUROMUSCULAR REEDUCATION: CPT

## 2019-07-22 PROCEDURE — 97110 THERAPEUTIC EXERCISES: CPT

## 2019-07-22 NOTE — PROGRESS NOTES
Progress Note    Today's date: 2019  Patient name: Tab Marie  : 1965  MRN: [id number]  Referring provider: Ayah Tirado DO  Dx:   Encounter Diagnosis     ICD-10-CM    1  Acute bilateral low back pain without sciatica M54 5        Start Time: 0800  Stop Time: 0900  Total time in clinic (min): 60 minutes    Subjective: Patient states, "I didn't know I've been walking wrong"  Patient did yard work yesterday and felt good while doing it, however, he woke up with soreness  Objective: See treatment diary below    Visit Tolerated treatment well  Patient exhibited good technique with therapeutic exercises  Utilized metronome this visit for pace during TM gait training  Verbal cueing for heel strike and toe off as well as decreased lateral hip movement  Improvements with gait noted  Added ball exercises to HEP  Precautions:     Manual  6/20  7/15          Bria Morse 10                                                                  Exercise Diary  6/20 7/12 7/15 7/19 7/22        HEP 15            bike  10' 10' 10' 8'        Calf str/HR  10x 10x  10 10"  x10        circuit  20x 20 20x2  LE 2x20 LE        Nerve glides             pulleys  30x 30          TM gt training    10' 15'        Elliptical             Ball ext   5 sec    20 20        Ball posture 5 sec    20 20        Ball supine arms             Ab ripper     10x10                                                                                                                  Modalities             ice 10            stim  20           US  10

## 2019-07-26 ENCOUNTER — OFFICE VISIT (OUTPATIENT)
Dept: PHYSICAL THERAPY | Facility: CLINIC | Age: 54
End: 2019-07-26
Payer: COMMERCIAL

## 2019-07-26 DIAGNOSIS — M54.50 ACUTE BILATERAL LOW BACK PAIN WITHOUT SCIATICA: Primary | ICD-10-CM

## 2019-07-26 PROCEDURE — 97116 GAIT TRAINING THERAPY: CPT

## 2019-07-26 PROCEDURE — 97112 NEUROMUSCULAR REEDUCATION: CPT

## 2019-07-26 PROCEDURE — 97110 THERAPEUTIC EXERCISES: CPT

## 2019-07-26 PROCEDURE — 97530 THERAPEUTIC ACTIVITIES: CPT

## 2019-07-26 NOTE — PROGRESS NOTES
Daily Note     Today's date: 2019  Patient name: Omaira López  : 1965  MRN: [id number]  Referring provider: Gael Aldana DO  Dx:   Encounter Diagnosis     ICD-10-CM    1  Acute bilateral low back pain without sciatica M54 5        Start Time: 0800  Stop Time: 0845  Total time in clinic (min): 45 minutes    Subjective: Patient stated back is feeling pretty good today  Objective: See treatment diary below      Assessment: Tolerated treatment well  Patient exhibited good technique with therapeutic exercises  Added ball supine exercise to improve core strength and stability  Functional lifting with cueing on proper lifting body mechanics so patient can lift objects  Added BS rows and lats for postural strengthening and awareness  Little cueing needed for gait on TM  Ambulation is improving  Plan: Continue per plan of care  Precautions:     Manual  6/20  7/15          Bria Morse 10                                                                  Exercise Diary  6/20 7/12 7/15 7/19 7/22 7/26       HEP 15            bike  10' 10' 10' 8'        Calf str/HR  10x 10x  10 10"  x10 10x       circuit  20x 20 20x2  LE 2x20 LE 2x20 LE       Nerve glides             pulleys  30x 30          TM gt training    10' 15' 10'       Elliptical             Ball ext  5 sec    20 20 20       Ball posture 5 sec    20 20 20       Ball supine arms      20       Ab ripper     10x10                     Functional lifting         10'       BS rows/lats      2x10                                                                          Modalities             ice 10            stim  20           US  10

## 2019-07-29 ENCOUNTER — OFFICE VISIT (OUTPATIENT)
Dept: PHYSICAL THERAPY | Facility: CLINIC | Age: 54
End: 2019-07-29
Payer: COMMERCIAL

## 2019-07-29 DIAGNOSIS — M54.50 ACUTE BILATERAL LOW BACK PAIN WITHOUT SCIATICA: Primary | ICD-10-CM

## 2019-07-29 PROCEDURE — 97110 THERAPEUTIC EXERCISES: CPT | Performed by: PHYSICAL THERAPIST

## 2019-07-29 PROCEDURE — 97530 THERAPEUTIC ACTIVITIES: CPT | Performed by: PHYSICAL THERAPIST

## 2019-07-29 PROCEDURE — 97112 NEUROMUSCULAR REEDUCATION: CPT | Performed by: PHYSICAL THERAPIST

## 2019-07-29 NOTE — PROGRESS NOTES
Daily Note     Today's date: 2019  Patient name: Altagracia Jade  : 1965  MRN: [id number]  Referring provider: Jordi James DO  Dx:   Encounter Diagnosis     ICD-10-CM    1  Acute bilateral low back pain without sciatica M54 5                   Subjective: Patient stated back is feeling pretty good today  Objective: See treatment diary below      Assessment: Tolerated treatment well  Patient exhibited good technique with therapeutic exercises  Added ball supine exercise to improve core strength and stability  Functional lifting with cueing on proper lifting body mechanics so patient can lift objects  Added BS rows and lats for postural strengthening and awareness  Little cueing needed for gait on TM  Ambulation is improving  Plan: Continue per plan of care  Precautions:     Manual  6/20  7/15          Bria Morse 10                                                                  Exercise Diary  6/20 7/12 7/15 7/19 7/22 7/26 7/29      HEP 15            bike  10' 10' 10' 8'        Calf str/HR  10x 10x  10 10"  x10 10x 10      circuit  20x 20 20x2  LE 2x20 LE 2x20 LE 20x2      Nerve glides             pulleys  30x 30    30      TM gt training    10' 15' 10' 10'      Elliptical             Ball ext  5 sec    20 20 20 20      Ball posture 5 sec    20 20 20 20      Ball supine arms      20 20      Ab ripper     10x10        Ball pushups       20      Functional lifting   2lvs      10' 10'      BS rows/lats      2x10 20                                                                         Modalities             ice 10            stim  20           US  10

## 2019-08-02 ENCOUNTER — OFFICE VISIT (OUTPATIENT)
Dept: PHYSICAL THERAPY | Facility: CLINIC | Age: 54
End: 2019-08-02
Payer: COMMERCIAL

## 2019-08-02 DIAGNOSIS — M54.50 ACUTE BILATERAL LOW BACK PAIN WITHOUT SCIATICA: Primary | ICD-10-CM

## 2019-08-02 PROCEDURE — 97530 THERAPEUTIC ACTIVITIES: CPT

## 2019-08-02 PROCEDURE — 97112 NEUROMUSCULAR REEDUCATION: CPT

## 2019-08-02 NOTE — PROGRESS NOTES
Discharge Note     Today's date: 2019  Patient name: Eliazar Jones  : 1965  MRN: [id number]  Referring provider: Yuliya Barrett DO  Dx:   Encounter Diagnosis     ICD-10-CM    1  Acute bilateral low back pain without sciatica M54 5        Start Time: 0800  Stop Time: 0845  Total time in clinic (min): 45 minutes    Assessment: Patient has met all goals  Patient had some stiffness in back post exercises  Body mechanics with functional lifting has improved  Balance on tball for back stabilization has improved  ROM is WNL  Plan: Conferred with primary PT to discharge patient due to meeting all goals  Objective: See treatment diary below    Subjective: Patient reports no new complaints  Precautions:     Manual  6/20  7/15          Bria Morse 10                                                                  Exercise Diary  6/20 7/12 7/15 7/19 7/22 7/26 7/29 8/2     HEP 15            bike  10' 10' 10' 8'        Calf str/HR  10x 10x  10 10"  x10 10x 10 10     circuit  20x 20 20x2  LE 2x20 LE 2x20 LE 20x2 20x2     Nerve glides             pulleys  30x 30    30 30     TM gt training    10' 15' 10' 10' 10'     Elliptical             Ball ext  5 sec    20 20 20 20 20x     Ball posture 5 sec    20 20 20 20 20x     Ball supine arms      20 20 20     Ab ripper     10x10        Ball pushups       20 20     Functional lifting   2lvs      10' 10' 10'     BS rows/lats      2x10 20 20                                                                        Modalities             ice 10            stim  20           US  10

## 2019-08-14 ENCOUNTER — OFFICE VISIT (OUTPATIENT)
Dept: FAMILY MEDICINE CLINIC | Facility: CLINIC | Age: 54
End: 2019-08-14
Payer: COMMERCIAL

## 2019-08-14 VITALS
SYSTOLIC BLOOD PRESSURE: 132 MMHG | HEIGHT: 68 IN | WEIGHT: 189 LBS | BODY MASS INDEX: 28.64 KG/M2 | RESPIRATION RATE: 16 BRPM | DIASTOLIC BLOOD PRESSURE: 84 MMHG | OXYGEN SATURATION: 97 % | HEART RATE: 58 BPM | TEMPERATURE: 98 F

## 2019-08-14 DIAGNOSIS — I10 BENIGN ESSENTIAL HTN: Primary | ICD-10-CM

## 2019-08-14 DIAGNOSIS — R53.83 FATIGUE, UNSPECIFIED TYPE: ICD-10-CM

## 2019-08-14 DIAGNOSIS — Z12.5 SCREENING FOR PROSTATE CANCER: ICD-10-CM

## 2019-08-14 DIAGNOSIS — M95.4 STERNAL DEFORMITY: ICD-10-CM

## 2019-08-14 DIAGNOSIS — E66.3 OVERWEIGHT: ICD-10-CM

## 2019-08-14 DIAGNOSIS — M77.8 ELBOW TENDINITIS: ICD-10-CM

## 2019-08-14 PROCEDURE — 99214 OFFICE O/P EST MOD 30 MIN: CPT | Performed by: INTERNAL MEDICINE

## 2019-08-14 PROCEDURE — 3008F BODY MASS INDEX DOCD: CPT | Performed by: INTERNAL MEDICINE

## 2019-08-14 PROCEDURE — 3075F SYST BP GE 130 - 139MM HG: CPT | Performed by: INTERNAL MEDICINE

## 2019-08-14 RX ORDER — METHYLPREDNISOLONE 4 MG/1
TABLET ORAL
Qty: 21 EACH | Refills: 0 | Status: SHIPPED | OUTPATIENT
Start: 2019-08-14 | End: 2019-10-02
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
## 2019-09-28 ENCOUNTER — APPOINTMENT (OUTPATIENT)
Dept: LAB | Facility: HOSPITAL | Age: 54
End: 2019-09-28
Payer: COMMERCIAL

## 2019-09-28 ENCOUNTER — HOSPITAL ENCOUNTER (OUTPATIENT)
Dept: RADIOLOGY | Facility: HOSPITAL | Age: 54
Discharge: HOME | End: 2019-09-28
Payer: COMMERCIAL

## 2019-09-28 DIAGNOSIS — R53.83 FATIGUE, UNSPECIFIED TYPE: ICD-10-CM

## 2019-09-28 DIAGNOSIS — E66.3 OVERWEIGHT: ICD-10-CM

## 2019-09-28 DIAGNOSIS — Z12.5 SCREENING FOR PROSTATE CANCER: ICD-10-CM

## 2019-09-28 DIAGNOSIS — M95.4 STERNAL DEFORMITY: ICD-10-CM

## 2019-09-28 LAB
ALBUMIN SERPL BCP-MCNC: 3.7 G/DL (ref 3.5–5)
ALP SERPL-CCNC: 135 U/L (ref 46–116)
ALT SERPL W P-5'-P-CCNC: 17 U/L (ref 12–78)
ANION GAP SERPL CALCULATED.3IONS-SCNC: 7 MMOL/L (ref 4–13)
AST SERPL W P-5'-P-CCNC: 26 U/L (ref 5–45)
BILIRUB SERPL-MCNC: 0.4 MG/DL (ref 0.2–1)
BUN SERPL-MCNC: 19 MG/DL (ref 5–25)
CALCIUM SERPL-MCNC: 8.8 MG/DL (ref 8.3–10.1)
CHLORIDE SERPL-SCNC: 104 MMOL/L (ref 100–108)
CHOLEST SERPL-MCNC: 221 MG/DL (ref 50–200)
CO2 SERPL-SCNC: 27 MMOL/L (ref 21–32)
CREAT SERPL-MCNC: 1.13 MG/DL (ref 0.6–1.3)
ERYTHROCYTE [DISTWIDTH] IN BLOOD BY AUTOMATED COUNT: 12.7 % (ref 11.6–15.1)
GFR SERPL CREATININE-BSD FRML MDRD: 73 ML/MIN/1.73SQ M
GLUCOSE P FAST SERPL-MCNC: 80 MG/DL (ref 65–99)
HCT VFR BLD AUTO: 48.6 % (ref 36.5–49.3)
HDLC SERPL-MCNC: 36 MG/DL (ref 40–60)
HGB BLD-MCNC: 16.5 G/DL (ref 12–17)
LDLC SERPL CALC-MCNC: 154 MG/DL (ref 0–100)
MCH RBC QN AUTO: 30.4 PG (ref 26.8–34.3)
MCHC RBC AUTO-ENTMCNC: 34 G/DL (ref 31.4–37.4)
MCV RBC AUTO: 90 FL (ref 82–98)
NONHDLC SERPL-MCNC: 185 MG/DL
PLATELET # BLD AUTO: 159 THOUSANDS/UL (ref 149–390)
PMV BLD AUTO: 10.5 FL (ref 8.9–12.7)
POTASSIUM SERPL-SCNC: 3.9 MMOL/L (ref 3.5–5.3)
PROT SERPL-MCNC: 8 G/DL (ref 6.4–8.2)
RBC # BLD AUTO: 5.42 MILLION/UL (ref 3.88–5.62)
SODIUM SERPL-SCNC: 138 MMOL/L (ref 136–145)
TRIGL SERPL-MCNC: 154 MG/DL
TSH SERPL DL<=0.05 MIU/L-ACNC: 1.29 UIU/ML (ref 0.36–3.74)
WBC # BLD AUTO: 3.97 THOUSAND/UL (ref 4.31–10.16)

## 2019-09-28 PROCEDURE — 71120 X-RAY EXAM BREASTBONE 2/>VWS: CPT

## 2019-09-28 PROCEDURE — 80061 LIPID PANEL: CPT

## 2019-09-28 PROCEDURE — 71046 X-RAY EXAM CHEST 2 VIEWS: CPT

## 2019-09-28 PROCEDURE — 36415 COLL VENOUS BLD VENIPUNCTURE: CPT

## 2019-09-28 PROCEDURE — 84443 ASSAY THYROID STIM HORMONE: CPT

## 2019-09-28 PROCEDURE — 85027 COMPLETE CBC AUTOMATED: CPT

## 2019-09-28 PROCEDURE — 84153 ASSAY OF PSA TOTAL: CPT

## 2019-09-28 PROCEDURE — 84154 ASSAY OF PSA FREE: CPT

## 2019-09-28 PROCEDURE — 80053 COMPREHEN METABOLIC PANEL: CPT

## 2019-10-01 LAB
PSA FREE MFR SERPL: 28.3 %
PSA FREE SERPL-MCNC: 0.17 NG/ML
PSA SERPL-MCNC: 0.6 NG/ML (ref 0–4)

## 2019-10-02 ENCOUNTER — OFFICE VISIT (OUTPATIENT)
Dept: OBGYN CLINIC | Facility: MEDICAL CENTER | Age: 54
End: 2019-10-02
Payer: COMMERCIAL

## 2019-10-02 VITALS
DIASTOLIC BLOOD PRESSURE: 128 MMHG | SYSTOLIC BLOOD PRESSURE: 182 MMHG | WEIGHT: 193.2 LBS | HEIGHT: 68 IN | BODY MASS INDEX: 29.28 KG/M2 | HEART RATE: 68 BPM

## 2019-10-02 DIAGNOSIS — M54.50 CHRONIC BILATERAL LOW BACK PAIN WITHOUT SCIATICA: Primary | ICD-10-CM

## 2019-10-02 DIAGNOSIS — G89.29 CHRONIC BILATERAL LOW BACK PAIN WITHOUT SCIATICA: Primary | ICD-10-CM

## 2019-10-02 DIAGNOSIS — M43.17 SPONDYLOLISTHESIS OF LUMBOSACRAL REGION: ICD-10-CM

## 2019-10-02 PROCEDURE — 99214 OFFICE O/P EST MOD 30 MIN: CPT | Performed by: EMERGENCY MEDICINE

## 2019-10-02 RX ORDER — PREDNISONE 20 MG/1
TABLET ORAL
Qty: 18 TABLET | Refills: 0 | Status: SHIPPED | OUTPATIENT
Start: 2019-10-02 | End: 2019-10-11
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
## 2019-10-02 NOTE — PATIENT INSTRUCTIONS
Follow-up with your family physician regarding your elevated blood pressure  While taking oral steroids (Prednisone, Medrol dose pack) do not take any NSAIDs such as Advil, ibuprofen, Motrin, Aleve or naproxen  You can restart the NSAIDs after you finish the steroids  However you may take Tylenol with these medications    While taking oral steroids, you may experience mild side effects such as feeling jittery or flushing  Please call if your side effects are significant or you have any questions

## 2019-10-03 ENCOUNTER — TELEPHONE (OUTPATIENT)
Dept: FAMILY MEDICINE CLINIC | Facility: CLINIC | Age: 54
End: 2019-10-03

## 2019-10-03 ENCOUNTER — HOSPITAL ENCOUNTER (OUTPATIENT)
Dept: MRI IMAGING | Facility: HOSPITAL | Age: 54
Discharge: HOME | End: 2019-10-03
Attending: EMERGENCY MEDICINE
Payer: COMMERCIAL

## 2019-10-03 DIAGNOSIS — M43.17 SPONDYLOLISTHESIS OF LUMBOSACRAL REGION: ICD-10-CM

## 2019-10-03 DIAGNOSIS — J18.9 PNEUMONIA DUE TO INFECTIOUS ORGANISM, UNSPECIFIED LATERALITY, UNSPECIFIED PART OF LUNG: ICD-10-CM

## 2019-10-03 DIAGNOSIS — G89.29 CHRONIC BILATERAL LOW BACK PAIN WITHOUT SCIATICA: ICD-10-CM

## 2019-10-03 DIAGNOSIS — M54.50 CHRONIC BILATERAL LOW BACK PAIN WITHOUT SCIATICA: ICD-10-CM

## 2019-10-03 DIAGNOSIS — R79.89 ABNORMAL LFTS: Primary | ICD-10-CM

## 2019-10-03 PROCEDURE — 72148 MRI LUMBAR SPINE W/O DYE: CPT

## 2019-10-03 RX ORDER — LEVOFLOXACIN 500 MG/1
500 TABLET, FILM COATED ORAL EVERY 24 HOURS
Qty: 10 TABLET | Refills: 0 | Status: SHIPPED | OUTPATIENT
Start: 2019-10-03 | End: 2019-10-13

## 2019-10-03 NOTE — TELEPHONE ENCOUNTER
Left message ? Pneumonia on cxr  Will rx antibiotic and need repeat cxr  Also elevated cholesterol and alk phos    rec repeat lft and u/s   rto 1 month

## 2019-10-03 NOTE — TELEPHONE ENCOUNTER
Pt returned your call  Gave him your message  He would like to know what cholesterol medication you'd prescribe & possible side effects  Please call on cell #

## 2019-10-04 ENCOUNTER — TELEPHONE (OUTPATIENT)
Dept: OBGYN CLINIC | Facility: HOSPITAL | Age: 54
End: 2019-10-04

## 2019-10-04 NOTE — TELEPHONE ENCOUNTER
Dr Leticia Duverney patient - MRI Lumbar spine results are available in epic with significant findings

## 2019-10-07 ENCOUNTER — OFFICE VISIT (OUTPATIENT)
Dept: OBGYN CLINIC | Facility: CLINIC | Age: 54
End: 2019-10-07
Payer: COMMERCIAL

## 2019-10-07 ENCOUNTER — APPOINTMENT (OUTPATIENT)
Dept: RADIOLOGY | Facility: CLINIC | Age: 54
End: 2019-10-07
Payer: COMMERCIAL

## 2019-10-07 ENCOUNTER — OFFICE VISIT (OUTPATIENT)
Dept: FAMILY MEDICINE CLINIC | Facility: CLINIC | Age: 54
End: 2019-10-07
Payer: COMMERCIAL

## 2019-10-07 VITALS
HEART RATE: 62 BPM | BODY MASS INDEX: 29.55 KG/M2 | DIASTOLIC BLOOD PRESSURE: 84 MMHG | RESPIRATION RATE: 16 BRPM | TEMPERATURE: 97.4 F | WEIGHT: 195 LBS | OXYGEN SATURATION: 97 % | SYSTOLIC BLOOD PRESSURE: 116 MMHG | HEIGHT: 68 IN

## 2019-10-07 VITALS
SYSTOLIC BLOOD PRESSURE: 144 MMHG | DIASTOLIC BLOOD PRESSURE: 92 MMHG | WEIGHT: 194.6 LBS | HEART RATE: 71 BPM | RESPIRATION RATE: 16 BRPM | BODY MASS INDEX: 29.49 KG/M2 | HEIGHT: 68 IN

## 2019-10-07 DIAGNOSIS — M51.26 PROTRUSION OF LUMBAR INTERVERTEBRAL DISC: ICD-10-CM

## 2019-10-07 DIAGNOSIS — M54.50 CHRONIC BILATERAL LOW BACK PAIN WITHOUT SCIATICA: ICD-10-CM

## 2019-10-07 DIAGNOSIS — M54.50 CHRONIC BILATERAL LOW BACK PAIN WITHOUT SCIATICA: Primary | ICD-10-CM

## 2019-10-07 DIAGNOSIS — M48.00 CENTRAL STENOSIS OF SPINAL CANAL: ICD-10-CM

## 2019-10-07 DIAGNOSIS — I10 HYPERTENSION, UNSPECIFIED TYPE: ICD-10-CM

## 2019-10-07 DIAGNOSIS — G89.29 CHRONIC BILATERAL LOW BACK PAIN WITHOUT SCIATICA: ICD-10-CM

## 2019-10-07 DIAGNOSIS — G89.29 CHRONIC BILATERAL LOW BACK PAIN WITHOUT SCIATICA: Primary | ICD-10-CM

## 2019-10-07 DIAGNOSIS — M43.17 SPONDYLOLISTHESIS OF LUMBOSACRAL REGION: ICD-10-CM

## 2019-10-07 DIAGNOSIS — J18.9 PNEUMONIA OF RIGHT MIDDLE LOBE DUE TO INFECTIOUS ORGANISM: Primary | ICD-10-CM

## 2019-10-07 PROCEDURE — 99213 OFFICE O/P EST LOW 20 MIN: CPT | Performed by: EMERGENCY MEDICINE

## 2019-10-07 PROCEDURE — 72120 X-RAY BEND ONLY L-S SPINE: CPT

## 2019-10-07 PROCEDURE — 3074F SYST BP LT 130 MM HG: CPT | Performed by: INTERNAL MEDICINE

## 2019-10-07 PROCEDURE — 3079F DIAST BP 80-89 MM HG: CPT | Performed by: INTERNAL MEDICINE

## 2019-10-07 PROCEDURE — 99214 OFFICE O/P EST MOD 30 MIN: CPT | Performed by: INTERNAL MEDICINE

## 2019-10-07 RX ORDER — LISINOPRIL 20 MG/1
20 TABLET ORAL DAILY
Qty: 90 TABLET | Refills: 1 | Status: SHIPPED | OUTPATIENT
Start: 2019-10-07 | End: 2020-06-18 | Stop reason: SDUPTHER

## 2019-10-07 RX ORDER — ALBUTEROL SULFATE 90 UG/1
2 AEROSOL, METERED RESPIRATORY (INHALATION) EVERY 6 HOURS PRN
Qty: 1 INHALER | Refills: 1 | Status: SHIPPED | OUTPATIENT
Start: 2019-10-07 | End: 2019-10-28 | Stop reason: SDUPTHER

## 2019-10-07 RX ORDER — METOPROLOL SUCCINATE 50 MG/1
25 TABLET, EXTENDED RELEASE ORAL DAILY
Qty: 45 TABLET | Refills: 1 | Status: SHIPPED | OUTPATIENT
Start: 2019-10-07 | End: 2020-06-18 | Stop reason: SDUPTHER

## 2019-10-07 RX ORDER — AMOXICILLIN 500 MG/1
500 CAPSULE ORAL EVERY 8 HOURS SCHEDULED
Qty: 30 CAPSULE | Refills: 0 | Status: SHIPPED | OUTPATIENT
Start: 2019-10-07 | End: 2019-10-17

## 2019-10-07 RX ORDER — AMLODIPINE BESYLATE 10 MG/1
10 TABLET ORAL DAILY
Qty: 90 TABLET | Refills: 1 | Status: SHIPPED | OUTPATIENT
Start: 2019-10-07 | End: 2020-06-18 | Stop reason: SDUPTHER
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
## 2019-10-11 ENCOUNTER — OFFICE VISIT (OUTPATIENT)
Dept: FAMILY MEDICINE CLINIC | Facility: CLINIC | Age: 54
End: 2019-10-11
Payer: COMMERCIAL

## 2019-10-11 VITALS
TEMPERATURE: 97.3 F | WEIGHT: 191.2 LBS | RESPIRATION RATE: 16 BRPM | DIASTOLIC BLOOD PRESSURE: 82 MMHG | SYSTOLIC BLOOD PRESSURE: 124 MMHG | OXYGEN SATURATION: 96 % | HEIGHT: 68 IN | HEART RATE: 57 BPM | BODY MASS INDEX: 28.98 KG/M2

## 2019-10-11 DIAGNOSIS — J18.9 PNEUMONIA OF RIGHT LUNG DUE TO INFECTIOUS ORGANISM, UNSPECIFIED PART OF LUNG: Primary | ICD-10-CM

## 2019-10-11 PROCEDURE — 99213 OFFICE O/P EST LOW 20 MIN: CPT | Performed by: INTERNAL MEDICINE

## 2019-10-11 RX ORDER — PREDNISONE 10 MG/1
TABLET ORAL
Qty: 20 TABLET | Refills: 1 | Status: SHIPPED | OUTPATIENT
Start: 2019-10-11 | End: 2019-10-29 | Stop reason: ALTCHOICE

## 2019-10-11 RX ORDER — GUAIFENESIN AND CODEINE PHOSPHATE 100; 10 MG/5ML; MG/5ML
5 SOLUTION ORAL 3 TIMES DAILY PRN
Qty: 120 ML | Refills: 0 | Status: SHIPPED | OUTPATIENT
Start: 2019-10-11 | End: 2019-10-29 | Stop reason: ALTCHOICE
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
## 2019-10-14 ENCOUNTER — OFFICE VISIT (OUTPATIENT)
Dept: OBGYN CLINIC | Facility: HOSPITAL | Age: 54
End: 2019-10-14
Payer: COMMERCIAL

## 2019-10-14 VITALS
BODY MASS INDEX: 29.98 KG/M2 | SYSTOLIC BLOOD PRESSURE: 130 MMHG | WEIGHT: 191 LBS | HEART RATE: 82 BPM | HEIGHT: 67 IN | DIASTOLIC BLOOD PRESSURE: 83 MMHG

## 2019-10-14 DIAGNOSIS — M54.50 CHRONIC MIDLINE LOW BACK PAIN WITHOUT SCIATICA: Primary | ICD-10-CM

## 2019-10-14 DIAGNOSIS — M62.830 LUMBAR PARASPINAL MUSCLE SPASM: ICD-10-CM

## 2019-10-14 DIAGNOSIS — G89.29 CHRONIC MIDLINE LOW BACK PAIN WITHOUT SCIATICA: Primary | ICD-10-CM

## 2019-10-14 DIAGNOSIS — M43.16 SPONDYLOLISTHESIS, LUMBAR REGION: ICD-10-CM

## 2019-10-14 PROCEDURE — 99214 OFFICE O/P EST MOD 30 MIN: CPT | Performed by: ORTHOPAEDIC SURGERY
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
## 2019-10-22 ENCOUNTER — HOSPITAL ENCOUNTER (OUTPATIENT)
Dept: RADIOLOGY | Facility: HOSPITAL | Age: 54
Discharge: HOME | End: 2019-10-22
Payer: COMMERCIAL

## 2019-10-22 DIAGNOSIS — J18.9 PNEUMONIA OF RIGHT MIDDLE LOBE DUE TO INFECTIOUS ORGANISM: ICD-10-CM

## 2019-10-22 PROCEDURE — 71046 X-RAY EXAM CHEST 2 VIEWS: CPT

## 2019-10-23 ENCOUNTER — TELEPHONE (OUTPATIENT)
Dept: FAMILY MEDICINE CLINIC | Facility: CLINIC | Age: 54
End: 2019-10-23

## 2019-10-23 NOTE — TELEPHONE ENCOUNTER
Pt called to see if xray results are in  He doesn't feel he is getting better  Please call once you've had the chance to review

## 2019-10-25 ENCOUNTER — TELEPHONE (OUTPATIENT)
Dept: FAMILY MEDICINE CLINIC | Facility: CLINIC | Age: 54
End: 2019-10-25

## 2019-10-25 DIAGNOSIS — R05.9 COUGH: Primary | ICD-10-CM

## 2019-10-25 NOTE — TELEPHONE ENCOUNTER
Pt states he is coughing up flem  Sometimes feels lightheaded with cough  He will come in on Monday  To er or urgent care earlier if needed  Still awaiting his cxr    Call in [facility]

## 2019-10-25 NOTE — TELEPHONE ENCOUNTER
Pt called to make you aware pneumonia is continuing to get worse  He has finished his medication  Pt also called on Monday

## 2019-10-28 ENCOUNTER — OFFICE VISIT (OUTPATIENT)
Dept: FAMILY MEDICINE CLINIC | Facility: CLINIC | Age: 54
End: 2019-10-28
Payer: COMMERCIAL

## 2019-10-28 VITALS
BODY MASS INDEX: 30.13 KG/M2 | HEART RATE: 82 BPM | TEMPERATURE: 98.5 F | DIASTOLIC BLOOD PRESSURE: 84 MMHG | HEIGHT: 67 IN | OXYGEN SATURATION: 94 % | RESPIRATION RATE: 16 BRPM | SYSTOLIC BLOOD PRESSURE: 134 MMHG | WEIGHT: 192 LBS

## 2019-10-28 DIAGNOSIS — J18.9 PNEUMONIA OF RIGHT MIDDLE LOBE DUE TO INFECTIOUS ORGANISM: ICD-10-CM

## 2019-10-28 PROCEDURE — 99214 OFFICE O/P EST MOD 30 MIN: CPT | Performed by: INTERNAL MEDICINE

## 2019-10-28 PROCEDURE — 3008F BODY MASS INDEX DOCD: CPT | Performed by: INTERNAL MEDICINE

## 2019-10-28 RX ORDER — ALBUTEROL SULFATE 90 UG/1
2 AEROSOL, METERED RESPIRATORY (INHALATION) EVERY 4 HOURS PRN
Qty: 1 INHALER | Refills: 1 | Status: SHIPPED | OUTPATIENT
Start: 2019-10-28 | End: 2020-01-21
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
## 2019-10-29 ENCOUNTER — CONSULT (OUTPATIENT)
Dept: PAIN MEDICINE | Facility: MEDICAL CENTER | Age: 54
End: 2019-10-29
Payer: COMMERCIAL

## 2019-10-29 VITALS
SYSTOLIC BLOOD PRESSURE: 158 MMHG | BODY MASS INDEX: 30.1 KG/M2 | RESPIRATION RATE: 14 BRPM | HEART RATE: 66 BPM | HEIGHT: 67 IN | WEIGHT: 191.8 LBS | DIASTOLIC BLOOD PRESSURE: 104 MMHG

## 2019-10-29 DIAGNOSIS — M54.50 CHRONIC BILATERAL LOW BACK PAIN WITHOUT SCIATICA: ICD-10-CM

## 2019-10-29 DIAGNOSIS — M43.17 SPONDYLOLISTHESIS OF LUMBOSACRAL REGION: ICD-10-CM

## 2019-10-29 DIAGNOSIS — G89.29 CHRONIC BILATERAL LOW BACK PAIN WITHOUT SCIATICA: ICD-10-CM

## 2019-10-29 PROCEDURE — 99244 OFF/OP CNSLTJ NEW/EST MOD 40: CPT | Performed by: PHYSICAL MEDICINE & REHABILITATION
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
## 2019-11-06 ENCOUNTER — CLINICAL SUPPORT (OUTPATIENT)
Dept: FAMILY MEDICINE CLINIC | Facility: CLINIC | Age: 54
End: 2019-11-06
Payer: COMMERCIAL

## 2019-11-06 DIAGNOSIS — Z23 ENCOUNTER FOR IMMUNIZATION: Primary | ICD-10-CM

## 2019-11-06 PROCEDURE — 90682 RIV4 VACC RECOMBINANT DNA IM: CPT

## 2019-11-06 PROCEDURE — 90732 PPSV23 VACC 2 YRS+ SUBQ/IM: CPT

## 2019-11-06 PROCEDURE — 90472 IMMUNIZATION ADMIN EACH ADD: CPT

## 2019-11-06 PROCEDURE — 90471 IMMUNIZATION ADMIN: CPT

## 2019-11-06 PROCEDURE — 90715 TDAP VACCINE 7 YRS/> IM: CPT

## 2019-11-13 ENCOUNTER — OFFICE VISIT (OUTPATIENT)
Dept: PULMONOLOGY | Facility: CLINIC | Age: 54
End: 2019-11-13
Payer: COMMERCIAL

## 2019-11-13 ENCOUNTER — TELEPHONE (OUTPATIENT)
Dept: PULMONOLOGY | Facility: CLINIC | Age: 54
End: 2019-11-13

## 2019-11-13 ENCOUNTER — APPOINTMENT (OUTPATIENT)
Dept: LAB | Facility: HOSPITAL | Age: 54
End: 2019-11-13
Attending: INTERNAL MEDICINE
Payer: COMMERCIAL

## 2019-11-13 ENCOUNTER — HOSPITAL ENCOUNTER (OUTPATIENT)
Dept: CT IMAGING | Facility: HOSPITAL | Age: 54
Discharge: HOME | End: 2019-11-13
Attending: INTERNAL MEDICINE
Payer: COMMERCIAL

## 2019-11-13 VITALS
BODY MASS INDEX: 30.61 KG/M2 | HEIGHT: 67 IN | SYSTOLIC BLOOD PRESSURE: 120 MMHG | OXYGEN SATURATION: 93 % | WEIGHT: 195 LBS | DIASTOLIC BLOOD PRESSURE: 80 MMHG | HEART RATE: 78 BPM

## 2019-11-13 DIAGNOSIS — R06.02 SOB (SHORTNESS OF BREATH): ICD-10-CM

## 2019-11-13 DIAGNOSIS — R09.02 HYPOXEMIA: ICD-10-CM

## 2019-11-13 DIAGNOSIS — R06.02 SOB (SHORTNESS OF BREATH): Primary | ICD-10-CM

## 2019-11-13 LAB
ANION GAP SERPL CALCULATED.3IONS-SCNC: 9 MMOL/L (ref 4–13)
BUN SERPL-MCNC: 22 MG/DL (ref 5–25)
CALCIUM SERPL-MCNC: 8.6 MG/DL (ref 8.3–10.1)
CHLORIDE SERPL-SCNC: 106 MMOL/L (ref 100–108)
CO2 SERPL-SCNC: 29 MMOL/L (ref 21–32)
CREAT SERPL-MCNC: 1.37 MG/DL (ref 0.6–1.3)
GFR SERPL CREATININE-BSD FRML MDRD: 58 ML/MIN/1.73SQ M
GLUCOSE P FAST SERPL-MCNC: 108 MG/DL (ref 65–99)
POTASSIUM SERPL-SCNC: 3.4 MMOL/L (ref 3.5–5.3)
SODIUM SERPL-SCNC: 144 MMOL/L (ref 136–145)

## 2019-11-13 PROCEDURE — 94010 BREATHING CAPACITY TEST: CPT | Performed by: INTERNAL MEDICINE

## 2019-11-13 PROCEDURE — 99244 OFF/OP CNSLTJ NEW/EST MOD 40: CPT | Performed by: INTERNAL MEDICINE

## 2019-11-13 PROCEDURE — 36415 COLL VENOUS BLD VENIPUNCTURE: CPT

## 2019-11-13 PROCEDURE — 71275 CT ANGIOGRAPHY CHEST: CPT

## 2019-11-13 PROCEDURE — 80048 BASIC METABOLIC PNL TOTAL CA: CPT

## 2019-11-13 RX ORDER — PREDNISONE 20 MG/1
TABLET ORAL
Qty: 18 TABLET | Refills: 0 | Status: SHIPPED | OUTPATIENT
Start: 2019-11-13 | End: 2019-12-02 | Stop reason: ALTCHOICE

## 2019-11-13 RX ADMIN — IOHEXOL 100 ML: 350 INJECTION, SOLUTION INTRAVENOUS at 17:30
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
## 2019-11-20 ENCOUNTER — HOSPITAL ENCOUNTER (OUTPATIENT)
Dept: RADIOLOGY | Facility: MEDICAL CENTER | Age: 54
Discharge: HOME | End: 2019-11-20
Attending: PHYSICAL MEDICINE & REHABILITATION | Admitting: PHYSICAL MEDICINE & REHABILITATION
Payer: COMMERCIAL

## 2019-11-20 VITALS
SYSTOLIC BLOOD PRESSURE: 132 MMHG | DIASTOLIC BLOOD PRESSURE: 86 MMHG | TEMPERATURE: 99 F | RESPIRATION RATE: 20 BRPM | OXYGEN SATURATION: 94 % | HEART RATE: 68 BPM

## 2019-11-20 DIAGNOSIS — M54.50 CHRONIC BILATERAL LOW BACK PAIN WITHOUT SCIATICA: ICD-10-CM

## 2019-11-20 DIAGNOSIS — G89.29 CHRONIC BILATERAL LOW BACK PAIN WITHOUT SCIATICA: ICD-10-CM

## 2019-11-20 DIAGNOSIS — M43.17 SPONDYLOLISTHESIS OF LUMBOSACRAL REGION: ICD-10-CM

## 2019-11-20 PROCEDURE — 64490 INJ PARAVERT F JNT C/T 1 LEV: CPT | Performed by: PHYSICAL MEDICINE & REHABILITATION

## 2019-11-20 PROCEDURE — 64491 INJ PARAVERT F JNT C/T 2 LEV: CPT | Performed by: PHYSICAL MEDICINE & REHABILITATION

## 2019-11-20 RX ADMIN — Medication 3 ML: at 10:37
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
## 2019-11-20 NOTE — DISCHARGE INSTRUCTIONS

## 2019-11-22 ENCOUNTER — TELEPHONE (OUTPATIENT)
Dept: RADIOLOGY | Facility: MEDICAL CENTER | Age: 54
End: 2019-11-22

## 2019-11-22 NOTE — TELEPHONE ENCOUNTER
Pain diary reviewed by Dr Doroteo West; proceed with MBB #2; I will call and coordinate       Bilateral L3-5

## 2019-12-02 ENCOUNTER — HOSPITAL ENCOUNTER (INPATIENT)
Facility: HOSPITAL | Age: 54
LOS: 3 days | Discharge: HOME | DRG: 871 | End: 2019-12-05
Attending: EMERGENCY MEDICINE | Admitting: INTERNAL MEDICINE
Payer: COMMERCIAL

## 2019-12-02 ENCOUNTER — APPOINTMENT (EMERGENCY)
Dept: CT IMAGING | Facility: HOSPITAL | Age: 54
DRG: 871 | End: 2019-12-02
Payer: COMMERCIAL

## 2019-12-02 DIAGNOSIS — E87.6 HYPOKALEMIA: ICD-10-CM

## 2019-12-02 DIAGNOSIS — A41.9 SEPSIS WITHOUT ACUTE ORGAN DYSFUNCTION, DUE TO UNSPECIFIED ORGANISM (HCC): ICD-10-CM

## 2019-12-02 DIAGNOSIS — J18.9 PNEUMONIA: Primary | ICD-10-CM

## 2019-12-02 DIAGNOSIS — J18.9 RECURRENT PNEUMONIA: ICD-10-CM

## 2019-12-02 LAB
ALBUMIN SERPL BCP-MCNC: 3.4 G/DL (ref 3.5–5)
ALP SERPL-CCNC: 126 U/L (ref 46–116)
ALT SERPL W P-5'-P-CCNC: 24 U/L (ref 12–78)
ANION GAP SERPL CALCULATED.3IONS-SCNC: 13 MMOL/L (ref 4–13)
AST SERPL W P-5'-P-CCNC: 26 U/L (ref 5–45)
BASOPHILS # BLD MANUAL: 0 THOUSAND/UL (ref 0–0.1)
BASOPHILS NFR MAR MANUAL: 0 % (ref 0–1)
BILIRUB SERPL-MCNC: 0.7 MG/DL (ref 0.2–1)
BUN SERPL-MCNC: 20 MG/DL (ref 5–25)
CALCIUM SERPL-MCNC: 8.4 MG/DL (ref 8.3–10.1)
CHLORIDE SERPL-SCNC: 103 MMOL/L (ref 100–108)
CO2 SERPL-SCNC: 23 MMOL/L (ref 21–32)
CREAT SERPL-MCNC: 1.25 MG/DL (ref 0.6–1.3)
EOSINOPHIL # BLD MANUAL: 0 THOUSAND/UL (ref 0–0.4)
EOSINOPHIL NFR BLD MANUAL: 0 % (ref 0–6)
ERYTHROCYTE [DISTWIDTH] IN BLOOD BY AUTOMATED COUNT: 13.2 % (ref 11.6–15.1)
GFR SERPL CREATININE-BSD FRML MDRD: 65 ML/MIN/1.73SQ M
GLUCOSE SERPL-MCNC: 97 MG/DL (ref 65–140)
HCT VFR BLD AUTO: 47.6 % (ref 36.5–49.3)
HGB BLD-MCNC: 16.3 G/DL (ref 12–17)
HIV 1+2 AB+HIV1 P24 AG SERPL QL IA: NORMAL
HIV1 P24 AG SER QL: NORMAL
LACTATE SERPL-SCNC: 0.9 MMOL/L (ref 0.5–2)
LYMPHOCYTES # BLD AUTO: 0.62 THOUSAND/UL (ref 0.6–4.47)
LYMPHOCYTES # BLD AUTO: 4 % (ref 14–44)
MCH RBC QN AUTO: 30.8 PG (ref 26.8–34.3)
MCHC RBC AUTO-ENTMCNC: 34.2 G/DL (ref 31.4–37.4)
MCV RBC AUTO: 90 FL (ref 82–98)
MONOCYTES # BLD AUTO: 1.4 THOUSAND/UL (ref 0–1.22)
MONOCYTES NFR BLD: 9 % (ref 4–12)
NEUTROPHILS # BLD MANUAL: 13.24 THOUSAND/UL (ref 1.85–7.62)
NEUTS SEG NFR BLD AUTO: 85 % (ref 43–75)
NRBC BLD AUTO-RTO: 2 /100 WBCS
PLATELET # BLD AUTO: 147 THOUSANDS/UL (ref 149–390)
PLATELET # BLD AUTO: 176 THOUSANDS/UL (ref 149–390)
PLATELET BLD QL SMEAR: ADEQUATE
PMV BLD AUTO: 10 FL (ref 8.9–12.7)
PMV BLD AUTO: 10.7 FL (ref 8.9–12.7)
POTASSIUM SERPL-SCNC: 3.3 MMOL/L (ref 3.5–5.3)
PROT SERPL-MCNC: 7.5 G/DL (ref 6.4–8.2)
RBC # BLD AUTO: 5.3 MILLION/UL (ref 3.88–5.62)
RBC MORPH BLD: NORMAL
SODIUM SERPL-SCNC: 139 MMOL/L (ref 136–145)
TOTAL CELLS COUNTED SPEC: 100
TROPONIN I SERPL-MCNC: <0.02 NG/ML
VARIANT LYMPHS # BLD AUTO: 2 %
WBC # BLD AUTO: 15.58 THOUSAND/UL (ref 4.31–10.16)

## 2019-12-02 PROCEDURE — 93005 ELECTROCARDIOGRAM TRACING: CPT

## 2019-12-02 PROCEDURE — 96375 TX/PRO/DX INJ NEW DRUG ADDON: CPT

## 2019-12-02 PROCEDURE — 83605 ASSAY OF LACTIC ACID: CPT | Performed by: EMERGENCY MEDICINE

## 2019-12-02 PROCEDURE — 85027 COMPLETE CBC AUTOMATED: CPT | Performed by: EMERGENCY MEDICINE

## 2019-12-02 PROCEDURE — 87040 BLOOD CULTURE FOR BACTERIA: CPT | Performed by: EMERGENCY MEDICINE

## 2019-12-02 PROCEDURE — 80053 COMPREHEN METABOLIC PANEL: CPT | Performed by: EMERGENCY MEDICINE

## 2019-12-02 PROCEDURE — 85049 AUTOMATED PLATELET COUNT: CPT | Performed by: PHYSICIAN ASSISTANT

## 2019-12-02 PROCEDURE — 71275 CT ANGIOGRAPHY CHEST: CPT

## 2019-12-02 PROCEDURE — 87806 HIV AG W/HIV1&2 ANTB W/OPTIC: CPT | Performed by: PHYSICIAN ASSISTANT

## 2019-12-02 PROCEDURE — 94640 AIRWAY INHALATION TREATMENT: CPT

## 2019-12-02 PROCEDURE — 99285 EMERGENCY DEPT VISIT HI MDM: CPT

## 2019-12-02 PROCEDURE — 96361 HYDRATE IV INFUSION ADD-ON: CPT

## 2019-12-02 PROCEDURE — 36415 COLL VENOUS BLD VENIPUNCTURE: CPT | Performed by: EMERGENCY MEDICINE

## 2019-12-02 PROCEDURE — 99285 EMERGENCY DEPT VISIT HI MDM: CPT | Performed by: EMERGENCY MEDICINE

## 2019-12-02 PROCEDURE — 85007 BL SMEAR W/DIFF WBC COUNT: CPT | Performed by: EMERGENCY MEDICINE

## 2019-12-02 PROCEDURE — 96365 THER/PROPH/DIAG IV INF INIT: CPT

## 2019-12-02 PROCEDURE — 84484 ASSAY OF TROPONIN QUANT: CPT | Performed by: EMERGENCY MEDICINE

## 2019-12-02 PROCEDURE — 99222 1ST HOSP IP/OBS MODERATE 55: CPT | Performed by: INTERNAL MEDICINE

## 2019-12-02 RX ORDER — ALBUTEROL SULFATE 2.5 MG/3ML
5 SOLUTION RESPIRATORY (INHALATION) ONCE
Status: COMPLETED | OUTPATIENT
Start: 2019-12-02 | End: 2019-12-02

## 2019-12-02 RX ORDER — AMLODIPINE BESYLATE 10 MG/1
10 TABLET ORAL DAILY
Status: DISCONTINUED | OUTPATIENT
Start: 2019-12-03 | End: 2019-12-05 | Stop reason: HOSPADM

## 2019-12-02 RX ORDER — FLUTICASONE FUROATE AND VILANTEROL 100; 25 UG/1; UG/1
1 POWDER RESPIRATORY (INHALATION)
Status: DISCONTINUED | OUTPATIENT
Start: 2019-12-03 | End: 2019-12-03

## 2019-12-02 RX ORDER — ASCORBIC ACID 500 MG
500 TABLET ORAL DAILY
Status: DISCONTINUED | OUTPATIENT
Start: 2019-12-03 | End: 2019-12-05 | Stop reason: HOSPADM

## 2019-12-02 RX ORDER — POTASSIUM CHLORIDE 20 MEQ/1
20 TABLET, EXTENDED RELEASE ORAL ONCE
Status: COMPLETED | OUTPATIENT
Start: 2019-12-02 | End: 2019-12-02

## 2019-12-02 RX ORDER — LEVALBUTEROL INHALATION SOLUTION 0.63 MG/3ML
0.63 SOLUTION RESPIRATORY (INHALATION) EVERY 6 HOURS PRN
Status: DISCONTINUED | OUTPATIENT
Start: 2019-12-02 | End: 2019-12-03

## 2019-12-02 RX ORDER — ASPIRIN 325 MG
325 TABLET ORAL
Status: DISCONTINUED | OUTPATIENT
Start: 2019-12-03 | End: 2019-12-05 | Stop reason: HOSPADM

## 2019-12-02 RX ORDER — LEVALBUTEROL INHALATION SOLUTION 0.63 MG/3ML
0.63 SOLUTION RESPIRATORY (INHALATION) EVERY 8 HOURS PRN
Status: DISCONTINUED | OUTPATIENT
Start: 2019-12-02 | End: 2019-12-02

## 2019-12-02 RX ORDER — FLUTICASONE PROPIONATE 50 MCG
1 SPRAY, SUSPENSION (ML) NASAL DAILY
Status: DISCONTINUED | OUTPATIENT
Start: 2019-12-03 | End: 2019-12-05 | Stop reason: HOSPADM

## 2019-12-02 RX ORDER — ACETAMINOPHEN 325 MG/1
650 TABLET ORAL EVERY 6 HOURS PRN
Status: DISCONTINUED | OUTPATIENT
Start: 2019-12-02 | End: 2019-12-05 | Stop reason: HOSPADM

## 2019-12-02 RX ORDER — ACETAMINOPHEN 325 MG/1
650 TABLET ORAL ONCE
Status: COMPLETED | OUTPATIENT
Start: 2019-12-02 | End: 2019-12-02

## 2019-12-02 RX ORDER — AZITHROMYCIN 250 MG/1
500 TABLET, FILM COATED ORAL EVERY 24 HOURS
Status: DISCONTINUED | OUTPATIENT
Start: 2019-12-03 | End: 2019-12-04

## 2019-12-02 RX ORDER — GUAIFENESIN 600 MG
600 TABLET, EXTENDED RELEASE 12 HR ORAL 2 TIMES DAILY
Status: DISCONTINUED | OUTPATIENT
Start: 2019-12-02 | End: 2019-12-05 | Stop reason: HOSPADM

## 2019-12-02 RX ORDER — METOPROLOL SUCCINATE 25 MG/1
25 TABLET, EXTENDED RELEASE ORAL DAILY
Status: DISCONTINUED | OUTPATIENT
Start: 2019-12-03 | End: 2019-12-05 | Stop reason: HOSPADM

## 2019-12-02 RX ORDER — LISINOPRIL 20 MG/1
20 TABLET ORAL DAILY
Status: DISCONTINUED | OUTPATIENT
Start: 2019-12-03 | End: 2019-12-05 | Stop reason: HOSPADM

## 2019-12-02 RX ORDER — SODIUM CHLORIDE 9 MG/ML
75 INJECTION, SOLUTION INTRAVENOUS CONTINUOUS
Status: DISCONTINUED | OUTPATIENT
Start: 2019-12-02 | End: 2019-12-05 | Stop reason: HOSPADM

## 2019-12-02 RX ORDER — KETOROLAC TROMETHAMINE 30 MG/ML
15 INJECTION, SOLUTION INTRAMUSCULAR; INTRAVENOUS ONCE
Status: COMPLETED | OUTPATIENT
Start: 2019-12-02 | End: 2019-12-02

## 2019-12-02 RX ADMIN — PIPERACILLIN AND TAZOBACTAM 4.5 G: 4; .5 INJECTION, POWDER, LYOPHILIZED, FOR SOLUTION INTRAVENOUS at 20:20

## 2019-12-02 RX ADMIN — AZITHROMYCIN MONOHYDRATE 500 MG: 500 INJECTION, POWDER, LYOPHILIZED, FOR SOLUTION INTRAVENOUS at 16:55

## 2019-12-02 RX ADMIN — KETOROLAC TROMETHAMINE 15 MG: 30 INJECTION, SOLUTION INTRAMUSCULAR at 18:05

## 2019-12-02 RX ADMIN — IPRATROPIUM BROMIDE 0.5 MG: 0.5 SOLUTION RESPIRATORY (INHALATION) at 16:13

## 2019-12-02 RX ADMIN — ACETAMINOPHEN 650 MG: 325 TABLET, FILM COATED ORAL at 16:13

## 2019-12-02 RX ADMIN — METRONIDAZOLE 500 MG: 500 INJECTION, SOLUTION INTRAVENOUS at 22:03

## 2019-12-02 RX ADMIN — IPRATROPIUM BROMIDE 0.5 MG: 0.5 SOLUTION RESPIRATORY (INHALATION) at 18:05

## 2019-12-02 RX ADMIN — IOHEXOL 85 ML: 350 INJECTION, SOLUTION INTRAVENOUS at 17:53

## 2019-12-02 RX ADMIN — SODIUM CHLORIDE 75 ML/HR: 0.9 INJECTION, SOLUTION INTRAVENOUS at 22:03

## 2019-12-02 RX ADMIN — ALBUTEROL SULFATE 5 MG: 2.5 SOLUTION RESPIRATORY (INHALATION) at 16:13

## 2019-12-02 RX ADMIN — GUAIFENESIN 600 MG: 600 TABLET ORAL at 22:02

## 2019-12-02 RX ADMIN — ACETAMINOPHEN 650 MG: 325 TABLET, FILM COATED ORAL at 22:02

## 2019-12-02 RX ADMIN — SODIUM CHLORIDE 1000 ML: 0.9 INJECTION, SOLUTION INTRAVENOUS at 19:55

## 2019-12-02 RX ADMIN — POTASSIUM CHLORIDE 20 MEQ: 1500 TABLET, EXTENDED RELEASE ORAL at 16:54

## 2019-12-02 RX ADMIN — ALBUTEROL SULFATE 5 MG: 2.5 SOLUTION RESPIRATORY (INHALATION) at 18:05

## 2019-12-02 RX ADMIN — SODIUM CHLORIDE 1000 ML: 0.9 INJECTION, SOLUTION INTRAVENOUS at 16:14
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
## 2019-12-02 NOTE — ED NOTES
Patient transported to [address] [place] [address], [address] [place] [address]  12/02/19 4610

## 2019-12-03 LAB
ANION GAP SERPL CALCULATED.3IONS-SCNC: 9 MMOL/L (ref 4–13)
BASOPHILS # BLD AUTO: 0.04 THOUSANDS/ΜL (ref 0–0.1)
BASOPHILS NFR BLD AUTO: 0 % (ref 0–1)
BUN SERPL-MCNC: 22 MG/DL (ref 5–25)
CALCIUM SERPL-MCNC: 8 MG/DL (ref 8.3–10.1)
CHLORIDE SERPL-SCNC: 107 MMOL/L (ref 100–108)
CO2 SERPL-SCNC: 24 MMOL/L (ref 21–32)
CREAT SERPL-MCNC: 1.28 MG/DL (ref 0.6–1.3)
EOSINOPHIL # BLD AUTO: 0.1 THOUSAND/ΜL (ref 0–0.61)
EOSINOPHIL NFR BLD AUTO: 1 % (ref 0–6)
ERYTHROCYTE [DISTWIDTH] IN BLOOD BY AUTOMATED COUNT: 13.6 % (ref 11.6–15.1)
FLUAV RNA NPH QL NAA+PROBE: NORMAL
FLUBV RNA NPH QL NAA+PROBE: NORMAL
GFR SERPL CREATININE-BSD FRML MDRD: 63 ML/MIN/1.73SQ M
GLUCOSE SERPL-MCNC: 116 MG/DL (ref 65–140)
HCT VFR BLD AUTO: 42.1 % (ref 36.5–49.3)
HGB BLD-MCNC: 14.2 G/DL (ref 12–17)
IMM GRANULOCYTES # BLD AUTO: 0.05 THOUSAND/UL (ref 0–0.2)
IMM GRANULOCYTES NFR BLD AUTO: 0 % (ref 0–2)
L PNEUMO1 AG UR QL IA.RAPID: NEGATIVE
LYMPHOCYTES # BLD AUTO: 1.09 THOUSANDS/ΜL (ref 0.6–4.47)
LYMPHOCYTES NFR BLD AUTO: 10 % (ref 14–44)
MCH RBC QN AUTO: 30.9 PG (ref 26.8–34.3)
MCHC RBC AUTO-ENTMCNC: 33.7 G/DL (ref 31.4–37.4)
MCV RBC AUTO: 92 FL (ref 82–98)
MONOCYTES # BLD AUTO: 1.04 THOUSAND/ΜL (ref 0.17–1.22)
MONOCYTES NFR BLD AUTO: 9 % (ref 4–12)
NEUTROPHILS # BLD AUTO: 9.11 THOUSANDS/ΜL (ref 1.85–7.62)
NEUTS SEG NFR BLD AUTO: 80 % (ref 43–75)
NRBC BLD AUTO-RTO: 0 /100 WBCS
PLATELET # BLD AUTO: 143 THOUSANDS/UL (ref 149–390)
PMV BLD AUTO: 10.5 FL (ref 8.9–12.7)
POTASSIUM SERPL-SCNC: 3.1 MMOL/L (ref 3.5–5.3)
PROCALCITONIN SERPL-MCNC: 0.11 NG/ML
RBC # BLD AUTO: 4.6 MILLION/UL (ref 3.88–5.62)
RSV RNA NPH QL NAA+PROBE: NORMAL
S PNEUM AG UR QL: NEGATIVE
SODIUM SERPL-SCNC: 140 MMOL/L (ref 136–145)
WBC # BLD AUTO: 11.43 THOUSAND/UL (ref 4.31–10.16)

## 2019-12-03 PROCEDURE — 82784 ASSAY IGA/IGD/IGG/IGM EACH: CPT | Performed by: INTERNAL MEDICINE

## 2019-12-03 PROCEDURE — 94760 N-INVAS EAR/PLS OXIMETRY 1: CPT

## 2019-12-03 PROCEDURE — 99254 IP/OBS CNSLTJ NEW/EST MOD 60: CPT | Performed by: INTERNAL MEDICINE

## 2019-12-03 PROCEDURE — 92610 EVALUATE SWALLOWING FUNCTION: CPT

## 2019-12-03 PROCEDURE — 87631 RESP VIRUS 3-5 TARGETS: CPT | Performed by: PHYSICIAN ASSISTANT

## 2019-12-03 PROCEDURE — 80048 BASIC METABOLIC PNL TOTAL CA: CPT | Performed by: PHYSICIAN ASSISTANT

## 2019-12-03 PROCEDURE — 85025 COMPLETE CBC W/AUTO DIFF WBC: CPT | Performed by: PHYSICIAN ASSISTANT

## 2019-12-03 PROCEDURE — 82785 ASSAY OF IGE: CPT | Performed by: INTERNAL MEDICINE

## 2019-12-03 PROCEDURE — 99232 SBSQ HOSP IP/OBS MODERATE 35: CPT | Performed by: PHYSICIAN ASSISTANT

## 2019-12-03 PROCEDURE — 94640 AIRWAY INHALATION TREATMENT: CPT

## 2019-12-03 PROCEDURE — 94664 DEMO&/EVAL PT USE INHALER: CPT

## 2019-12-03 PROCEDURE — 86162 COMPLEMENT TOTAL (CH50): CPT | Performed by: INTERNAL MEDICINE

## 2019-12-03 PROCEDURE — 86160 COMPLEMENT ANTIGEN: CPT | Performed by: INTERNAL MEDICINE

## 2019-12-03 PROCEDURE — 87205 SMEAR GRAM STAIN: CPT | Performed by: PHYSICIAN ASSISTANT

## 2019-12-03 PROCEDURE — G8996 SWALLOW CURRENT STATUS: HCPCS

## 2019-12-03 PROCEDURE — 84145 PROCALCITONIN (PCT): CPT | Performed by: PHYSICIAN ASSISTANT

## 2019-12-03 PROCEDURE — G8997 SWALLOW GOAL STATUS: HCPCS

## 2019-12-03 PROCEDURE — 87070 CULTURE OTHR SPECIMN AEROBIC: CPT | Performed by: PHYSICIAN ASSISTANT

## 2019-12-03 PROCEDURE — 87449 NOS EACH ORGANISM AG IA: CPT | Performed by: PHYSICIAN ASSISTANT

## 2019-12-03 RX ORDER — POTASSIUM CHLORIDE 14.9 MG/ML
20 INJECTION INTRAVENOUS ONCE
Status: COMPLETED | OUTPATIENT
Start: 2019-12-03 | End: 2019-12-03

## 2019-12-03 RX ORDER — METRONIDAZOLE 500 MG/1
500 TABLET ORAL EVERY 8 HOURS SCHEDULED
Status: DISCONTINUED | OUTPATIENT
Start: 2019-12-03 | End: 2019-12-05 | Stop reason: HOSPADM

## 2019-12-03 RX ORDER — POTASSIUM CHLORIDE 20 MEQ/1
40 TABLET, EXTENDED RELEASE ORAL ONCE
Status: COMPLETED | OUTPATIENT
Start: 2019-12-03 | End: 2019-12-03

## 2019-12-03 RX ORDER — IPRATROPIUM BROMIDE AND ALBUTEROL SULFATE 2.5; .5 MG/3ML; MG/3ML
3 SOLUTION RESPIRATORY (INHALATION)
Status: DISCONTINUED | OUTPATIENT
Start: 2019-12-04 | End: 2019-12-04

## 2019-12-03 RX ORDER — KETOROLAC TROMETHAMINE 30 MG/ML
15 INJECTION, SOLUTION INTRAMUSCULAR; INTRAVENOUS EVERY 6 HOURS PRN
Status: DISPENSED | OUTPATIENT
Start: 2019-12-03 | End: 2019-12-05

## 2019-12-03 RX ORDER — IPRATROPIUM BROMIDE AND ALBUTEROL SULFATE 2.5; .5 MG/3ML; MG/3ML
3 SOLUTION RESPIRATORY (INHALATION)
Status: DISCONTINUED | OUTPATIENT
Start: 2019-12-03 | End: 2019-12-03

## 2019-12-03 RX ADMIN — ACETAMINOPHEN 650 MG: 325 TABLET, FILM COATED ORAL at 09:32

## 2019-12-03 RX ADMIN — METOPROLOL SUCCINATE 25 MG: 25 TABLET, EXTENDED RELEASE ORAL at 09:32

## 2019-12-03 RX ADMIN — OXYCODONE HYDROCHLORIDE AND ACETAMINOPHEN 500 MG: 500 TABLET ORAL at 09:32

## 2019-12-03 RX ADMIN — FLUTICASONE FUROATE AND VILANTEROL TRIFENATATE 1 PUFF: 100; 25 POWDER RESPIRATORY (INHALATION) at 09:33

## 2019-12-03 RX ADMIN — ASPIRIN 325 MG ORAL TABLET 325 MG: 325 PILL ORAL at 05:14

## 2019-12-03 RX ADMIN — FLUTICASONE PROPIONATE 1 SPRAY: 50 SPRAY, METERED NASAL at 09:33

## 2019-12-03 RX ADMIN — KETOROLAC TROMETHAMINE 15 MG: 30 INJECTION, SOLUTION INTRAMUSCULAR at 06:39

## 2019-12-03 RX ADMIN — ENOXAPARIN SODIUM 40 MG: 40 INJECTION SUBCUTANEOUS at 09:31

## 2019-12-03 RX ADMIN — METRONIDAZOLE 500 MG: 500 TABLET ORAL at 14:44

## 2019-12-03 RX ADMIN — ACETAMINOPHEN 650 MG: 325 TABLET, FILM COATED ORAL at 19:00

## 2019-12-03 RX ADMIN — AZITHROMYCIN 500 MG: 250 TABLET, FILM COATED ORAL at 16:25

## 2019-12-03 RX ADMIN — AMLODIPINE BESYLATE 10 MG: 10 TABLET ORAL at 09:32

## 2019-12-03 RX ADMIN — POTASSIUM CHLORIDE 20 MEQ: 14.9 INJECTION, SOLUTION INTRAVENOUS at 14:45

## 2019-12-03 RX ADMIN — IPRATROPIUM BROMIDE AND ALBUTEROL SULFATE 3 ML: 2.5; .5 SOLUTION RESPIRATORY (INHALATION) at 20:43

## 2019-12-03 RX ADMIN — LISINOPRIL 20 MG: 20 TABLET ORAL at 09:32

## 2019-12-03 RX ADMIN — METRONIDAZOLE 500 MG: 500 INJECTION, SOLUTION INTRAVENOUS at 05:14

## 2019-12-03 RX ADMIN — METRONIDAZOLE 500 MG: 500 TABLET ORAL at 21:19

## 2019-12-03 RX ADMIN — GUAIFENESIN 600 MG: 600 TABLET ORAL at 09:32

## 2019-12-03 RX ADMIN — CEFTRIAXONE SODIUM 1000 MG: 10 INJECTION, POWDER, FOR SOLUTION INTRAVENOUS at 21:20

## 2019-12-03 RX ADMIN — KETOROLAC TROMETHAMINE 15 MG: 30 INJECTION, SOLUTION INTRAMUSCULAR at 23:18

## 2019-12-03 RX ADMIN — POTASSIUM CHLORIDE 40 MEQ: 1500 TABLET, EXTENDED RELEASE ORAL at 14:44

## 2019-12-03 RX ADMIN — Medication 1 TABLET: at 09:32

## 2019-12-03 RX ADMIN — SODIUM CHLORIDE 75 ML/HR: 0.9 INJECTION, SOLUTION INTRAVENOUS at 14:46

## 2019-12-03 RX ADMIN — CEFTRIAXONE SODIUM 1000 MG: 10 INJECTION, POWDER, FOR SOLUTION INTRAVENOUS at 00:09

## 2019-12-03 RX ADMIN — GUAIFENESIN 600 MG: 600 TABLET ORAL at 17:06
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
## 2019-12-03 NOTE — SEPSIS NOTE
Sepsis Note   Debra Ryan 47 y o  male MRN: [id number]  Unit/Bed#: ED 10 Encounter: [id number]      qSOFA     Row Name 12/02/19 1845 12/02/19 1759 12/02/19 1645 12/02/19 1547 12/02/19 1539    Altered mental status GCS < 15        0      Respiratory Rate > / =22  0  0  0  0  0    Systolic BP < / =983  0  0  0  0  0    Q Sofa Score  0  0  0  0  0        Initial Sepsis Screening     Row Name 12/02/19 1934                Is the patient's history suggestive of a new or worsening infection? (!) Yes (Proceed)  -VC        Suspected source of infection  pneumonia  -VC        Are two or more of the following signs & symptoms of infection both present and new to the patient? (!) Yes (Proceed)  -VC        Indicate SIRS criteria  Hyperthemia > 38 3C (100 9F); Tachycardia > 90 bpm  -VC        If the answer is yes to both questions, suspicion of sepsis is present          If severe sepsis is present AND tissue hypoperfusion perists in the hour after fluid resuscitation or lactate > 4, the patient meets criteria for SEPTIC SHOCK          Are any of the following organ dysfunction criteria present within 6 hours of suspected infection and SIRS criteria that are NOT considered to be chronic conditions? No  -VC        Organ dysfunction          Date of presentation of severe sepsis          Time of presentation of severe sepsis          Tissue hypoperfusion persists in the hour after crystalloid fluid administration, evidenced, by either:          Was hypotension present within one hour of the conclusion of crystalloid fluid administration?           Date of presentation of septic shock          Time of presentation of septic shock            User Key  (r) = Recorded By, (t) = Taken By, (c) = Cosigned By    Initials Name Provider Type    Pelon Morales DO Physician

## 2019-12-03 NOTE — PROGRESS NOTES
Progress Note Dale Leblanc 1965, 47 y o  male MRN: [id number]    Unit/Bed#: -01 Encounter: [id number]    Primary Care Provider: Kwan Agarwal DO   Date and time admitted to hospital: 12/2/2019  3:37 PM      DOS: 12/3/2019    * Recurrent pneumonia  Assessment & Plan  · Pt reports diagnosis of pneumonia that was treated outpatient with levaquin and amoxicillin by PCP in September, since that time continues to have persistent cough and shortness of breath   · Repeat CT on 11/13 without cardiopulmonary disease  · CTA on admission negative for PE, lower lobe bilateral pneumonia noted concern for possible aspiration   · Pt denies any dysphagia symptoms or recent vomiting  · Recent weight loss, rapid HIV panel negative  · Pulmonary consult is pending   · Continue IV ceftriaxone/azithromycin with flagyl coverage  · SPEECH recommending regular diet and thin liquids, consider VBS  · Sputum culture/Strep pneumo/legionella pending  · Influenza swab negative  · Flonase, Advair inhaler, neb treatments   · Hold off on any steroid management for now pending pulmonary evaluation     Sepsis ([facility] [address] )  Assessment & Plan  · POA as evidenced by fever, tachycardia, leukocytosis with evidence of lower lobe pneumonia on CT scan, improving  · Blood cultures pending   · Continue IV ceftriaxone/azithromcyin and flagyl   · IVF hydration   · Influenza swab negative  · Sputum culture/urine antigens pending   · Supportive care with antipyretics   · Continue to monitor temps/WBC closely     Hypokalemia  Assessment & Plan  · K+ 3 1, persistent  · Will place on KDUR 40 mEq and IV potassium 20 mEq now   · Monitor BMP in the AM    Benign essential HTN  Assessment & Plan  · BP appears stable on review  · Continue amlodipine 10 mg daily, lisinopril 20 mg daily and Toprol XL 25 mg daily   · Monitor       VTE Pharmacologic Prophylaxis:   Pharmacologic: Enoxaparin (Lovenox)  Mechanical VTE Prophylaxis in Place: Yes    Patient Centered Rounds: I have evaluated patient without nursing staff present due to speaking to nurse outside patient's room Derek     Discussions with Specialists or Other Care Team Provider: Discussed with pulmonary, RN, Cm and reviewed previous notes     Education and Discussions with Family / Patient: Discussed with patient at bedside regarding plan of care, no friends or family members present at time of evaluation  Time Spent for Care: 30 minutes  More than 50% of total time spent on counseling and coordination of care as described above  Current Length of Stay: 1 day(s)    Current Patient Status: Inpatient   Certification Statement: The patient will continue to require additional inpatient hospital stay due to continued treatment for recurrent pneumonia with IV abx, pulmonary work up    Discharge Plan: Not medically stable as above, anticipate next 48 hours pending continued improvement     Code Status: Level 1 - Full Code      Subjective:   Pt reports that he feels a little better than yesterday, but states he continues to have some shortness of breath and pleuritic chest pain  Reports he did not sleep well due to excessive congestion which is improved after receiving flonase this morning  Denies any abdominal pain, vomiting, diarrhea, constipation or urinary difficulties  Does report some nausea prior to breakfast this morning but that resolved spontaneously  Objective:     Vitals:   Temp (24hrs), Av 9 °F (37 7 °C), Min:98 8 °F (37 1 °C), Max:102 3 °F (39 1 °C)    Temp:  [98 8 °F (37 1 °C)-102 3 °F (39 1 °C)] 98 8 °F (37 1 °C)  HR:  [] 83  Resp:  [16-21] 20  BP: (127-143)/(75-94) 143/94  SpO2:  [92 %-98 %] 92 %  Body mass index is 31 01 kg/m²  Input and Output Summary (last 24 hours):        Intake/Output Summary (Last 24 hours) at 12/3/2019 1434  Last data filed at 12/3/2019 0344  Gross per 24 hour   Intake 480 ml   Output 200 ml   Net 280 ml       Physical Exam:     Physical Exam   Constitutional: No distress  Pt is in no acute distress lying in his hospital bed resting comfortably  Pleasant and cooperative  NC in place saturating high 90s   HENT:   Head: Normocephalic and atraumatic  Eyes: Pupils are equal, round, and reactive to light  Conjunctivae are normal    Cardiovascular: Normal rate, regular rhythm and intact distal pulses  Pulmonary/Chest: Effort normal  No stridor  No respiratory distress  He has no wheezes  Decreased breath sounds bilaterally    Abdominal: Soft  Bowel sounds are normal  He exhibits no distension  There is no tenderness  There is no guarding  Musculoskeletal: He exhibits no edema  Neurological: He is alert  Skin: Skin is warm and dry  He is not diaphoretic  No erythema  Psychiatric: He has a normal mood and affect  Vitals reviewed  Additional Data:     Labs:    Results from last 7 days   Lab Units 12/03/19  0515   WBC Thousand/uL 11 43*   HEMOGLOBIN g/dL 14 2   HEMATOCRIT % 42 1   PLATELETS Thousands/uL 143*   NEUTROS PCT % 80*   LYMPHS PCT % 10*   MONOS PCT % 9   EOS PCT % 1     Results from last 7 days   Lab Units 12/03/19  0515 12/02/19  1611   POTASSIUM mmol/L 3 1* 3 3*   CHLORIDE mmol/L 107 103   CO2 mmol/L 24 23   BUN mg/dL 22 20   CREATININE mg/dL 1 28 1 25   CALCIUM mg/dL 8 0* 8 4   ALK PHOS U/L  --  126*   ALT U/L  --  24   AST U/L  --  26           * I Have Reviewed All Lab Data Listed Above  * Additional Pertinent Lab Tests Reviewed: All Labs Within Last 24 Hours Reviewed    Imaging:    Imaging Reports Reviewed Today Include: CTA  Imaging Personally Reviewed by Myself Includes:  None    Recent Cultures (last 7 days):     Results from last 7 days   Lab Units 12/02/19  1647 12/02/19  1611   BLOOD CULTURE  Received in Microbiology Lab  Culture in Progress  Received in Microbiology Lab  Culture in Progress         Last 24 Hours Medication List:     Current Facility-Administered Medications:  acetaminophen 650 mg Oral Q6H PRN Mary Butcher PA-C amLODIPine 10 mg Oral Daily Marlene Mendez PA-C    ascorbic acid 500 mg Oral Daily Marlene Mendez PA-C    aspirin 325 mg Oral Early Morning Marlene Mendez PA-C    cefTRIAXone 1,000 mg Intravenous Q24H Yovani Adame PA-C Last Rate: 1,000 mg (12/03/19 0009)   And        azithromycin 500 mg Oral Q24H Marlene Mendez PA-C    enoxaparin 40 mg Subcutaneous Daily Marlene Mendez PA-C    fluticasone 1 spray Each Nare Daily Marlene Mendez PA-C    fluticasone-vilanterol 1 puff Inhalation Daily Marlene Mendez PA-C    guaiFENesin 600 mg Oral BID Marlene Mendez PA-C    ipratropium 0 5 mg Nebulization Q6H PRN Andria Bloom MD    ketorolac 15 mg Intravenous Q6H PRN MALIKA Vivas    levalbuterol 0 63 mg Nebulization Q6H PRN Andria Bloom MD    lisinopril 20 mg Oral Daily Marlene Mendez PA-C    metoprolol succinate 25 mg Oral Daily Marlene Mendez PA-C    metroNIDAZOLE 500 mg Oral Q8H [address] Andria Bloom MD    multivitamin-minerals 1 tablet Oral Daily Marlene Mendez PA-C    potassium chloride 40 mEq Oral Once Yovani Adame PA-C    potassium chloride 20 mEq Intravenous Once Yovani Adame PA-C    sodium chloride 75 mL/hr Intravenous Continuous Yovani Adame PA-C Last Rate: 75 mL/hr (12/02/19 2203)        Today, Patient Was Seen By: Yovani Adame PA-C    ** Please Note: Dictation voice to text software may have been used in the creation of this document   **

## 2019-12-03 NOTE — ED NOTES
1) Chief Complaint: cough    2) Orientation Status: a/ox4    3) Abnormal labs/abnormal focused assessment/abnormal visits:  Cough  Pneumonia  WBC 15 58 Thousand/uL  k 3 3  Mild fever    4) Important medication drips such as heparin/Cardizem/insulin, etc  n/a    5) Last time narcotics were given: n/a    6) IV lines/drains/etc  20 rac    7)Isolation status: n/a    8)Skin: warm dry intact    9) Ambulation status: independent    10) trauma status: n/a    RN phone number Deon Senior RN  12/02/19 1951

## 2019-12-03 NOTE — ASSESSMENT & PLAN NOTE
· Pt reports diagnosis of pneumonia that was treated outpatient with levaquin and amoxicillin by PCP in September, since that time continues to have persistent cough and shortness of breath   · Repeat CT on 11/13 without cardiopulmonary disease  · CTA obtained today negative for PE, lower lobe bilateral pneumonia noted concern for possible aspiration   · Pt denies any dysphagia symptoms or recent vomiting  · Recent weight loss, obtain rapid HIV panel   · Pulmonary consultation   · Place on IV ceftriaxone/azithromycin with flagyl coverage  · SPEECH evaluation   · Sputum culture  · Strep pneumo/legionella/influenza swabs   · Respiratory protocol   · Flonase, Advair inhaler, neb treatments   · Hold off on any steroid management for now pending pulmonary evaluation

## 2019-12-03 NOTE — ASSESSMENT & PLAN NOTE
· BP appears stable on review  · Continue amlodipine 10 mg daily, lisinopril 20 mg daily and Toprol XL 25 mg daily   · Monitor

## 2019-12-03 NOTE — ASSESSMENT & PLAN NOTE
· POA as evidenced by fever, tachycardia, leukocytosis with evidence of lower lobe pneumonia on CT scan, improving  · Blood cultures pending   · Continue IV ceftriaxone/azithromcyin and flagyl   · IVF hydration   · Influenza swab negative  · Sputum culture/urine antigens pending   · Supportive care with antipyretics   · Continue to monitor temps/WBC closely

## 2019-12-03 NOTE — ASSESSMENT & PLAN NOTE
· POA as evidenced by fever, tachycardia, leukocytosis with evidence of lower lobe pneumonia on CT scan   · Blood cultures pending   · Continue IV ceftriaxone/azithromcyin and flagyl   · IVF hydration   · Influenza swab, sputum culture  · Supportive care with antipyretics   · Continue to monitor temps/WBC closely
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
## 2019-12-03 NOTE — PLAN OF CARE
Problem: RESPIRATORY - ADULT  Goal: Achieves optimal ventilation and oxygenation  Description  INTERVENTIONS:  - Assess for changes in respiratory status  - Assess for changes in mentation and behavior  - Position to facilitate oxygenation and minimize respiratory effort  - Oxygen administered by appropriate delivery if ordered  - Initiate smoking cessation education as indicated  - Encourage broncho-pulmonary hygiene including cough, deep breathe, Incentive Spirometry  - Assess the need for suctioning and aspirate as needed  - Assess and instruct to report SOB or any respiratory difficulty  - Respiratory Therapy support as indicated  Outcome: Progressing     Problem: PAIN - ADULT  Goal: Verbalizes/displays adequate comfort level or baseline comfort level  Description  Interventions:  - Encourage patient to monitor pain and request assistance  - Assess pain using appropriate pain scale  - Administer analgesics based on type and severity of pain and evaluate response  - Implement non-pharmacological measures as appropriate and evaluate response  - Consider cultural and social influences on pain and pain management  - Notify physician/advanced practitioner if interventions unsuccessful or patient reports new pain  Outcome: Progressing     Problem: INFECTION - ADULT  Goal: Absence or prevention of progression during hospitalization  Description  INTERVENTIONS:  - Assess and monitor for signs and symptoms of infection  - Monitor lab/diagnostic results  - Monitor all insertion sites, i e  indwelling lines, tubes, and drains  - Monitor endotracheal if appropriate and nasal secretions for changes in amount and color  - [place] appropriate cooling/warming therapies per order  - Administer medications as ordered  - Instruct and encourage patient and family to use good hand hygiene technique  - Identify and instruct in appropriate isolation precautions for identified infection/condition  Outcome: Progressing  Goal: Absence of fever/infection during neutropenic period  Description  INTERVENTIONS:  - Monitor WBC    Outcome: Progressing     Problem: SAFETY ADULT  Goal: Patient will remain free of falls  Description  INTERVENTIONS:  - Assess patient frequently for physical needs  -  Identify cognitive and physical deficits and behaviors that affect risk of falls    -  [place] fall precautions as indicated by assessment   - Educate patient/family on patient safety including physical limitations  - Instruct patient to call for assistance with activity based on assessment  - Modify environment to reduce risk of injury  - Consider OT/PT consult to assist with strengthening/mobility  Outcome: Progressing  Goal: Maintain or return to baseline ADL function  Description  INTERVENTIONS:  -  Assess patient's ability to carry out ADLs; assess patient's baseline for ADL function and identify physical deficits which impact ability to perform ADLs (bathing, care of mouth/teeth, toileting, grooming, dressing, etc )  - Assess/evaluate cause of self-care deficits   - Assess range of motion  - Assess patient's mobility; develop plan if impaired  - Assess patient's need for assistive devices and provide as appropriate  - Encourage maximum independence but intervene and supervise when necessary  - Involve family in performance of ADLs  - Assess for home care needs following discharge   - Consider OT consult to assist with ADL evaluation and planning for discharge  - Provide patient education as appropriate  Outcome: Progressing  Goal: Maintain or return mobility status to optimal level  Description  INTERVENTIONS:  - Assess patient's baseline mobility status (ambulation, transfers, stairs, etc )    - Identify cognitive and physical deficits and behaviors that affect mobility  - Identify mobility aids required to assist with transfers and/or ambulation (gait belt, sit-to-stand, lift, walker, cane, etc )  - [place] fall precautions as indicated by assessment  - Record patient progress and toleration of activity level on Mobility SBAR; progress patient to next Phase/Stage  - Instruct patient to call for assistance with activity based on assessment  - Consider rehabilitation consult to assist with strengthening/weightbearing, etc   Outcome: Progressing     Problem: DISCHARGE PLANNING  Goal: Discharge to home or other facility with appropriate resources  Description  INTERVENTIONS:  - Identify barriers to discharge w/patient and caregiver  - Arrange for needed discharge resources and transportation as appropriate  - Identify discharge learning needs (meds, wound care, etc )  - Arrange for interpretive services to assist at discharge as needed  - Refer to Case Management Department for coordinating discharge planning if the patient needs post-hospital services based on physician/advanced practitioner order or complex needs related to functional status, cognitive ability, or social support system  Outcome: Progressing     Problem: Knowledge Deficit  Goal: Patient/family/caregiver demonstrates understanding of disease process, treatment plan, medications, and discharge instructions  Description  Complete learning assessment and assess knowledge base    Interventions:  - Provide teaching at level of understanding  - Provide teaching via preferred learning methods  Outcome: Progressing

## 2019-12-03 NOTE — PROGRESS NOTES
The metronidazole has / have been converted to Oral per [facility] IV-to-PO Auto-Conversion Protocol for Adults as approved by the Pharmacy and Therapeutics Committee  The patient met all eligible criteria:  3 Age = 25years old   2) Received at least one dose of the IV form   3) Receiving at least one other scheduled oral/enteral medication   4) Tolerating an oral/enteral diet   and did not have any exclusions:   1) Critical care patient   2) Active GI bleed (IF assessing H2RAs or PPIs)   3) Continuous tube feeding (IF assessing cipro, doxycycline, levofloxacin, minocycline, rifampin, or voriconazole)   4) Receiving PO vancomycin (IF assessing metronidazole)   5) Persistent nausea and/or vomiting   6) Ileus or gastrointestinal obstruction   7) [place]/nasogastric tube set for continuous suction   8) Specific order not to automatically convert to PO (in the order's comments or if discussed in the most recent Infectious Disease or primary team's progress notes)

## 2019-12-03 NOTE — PLAN OF CARE
Problem: RESPIRATORY - ADULT  Goal: Achieves optimal ventilation and oxygenation  Description  INTERVENTIONS:  - Assess for changes in respiratory status  - Assess for changes in mentation and behavior  - Position to facilitate oxygenation and minimize respiratory effort  - Oxygen administered by appropriate delivery if ordered  - Initiate smoking cessation education as indicated  - Encourage broncho-pulmonary hygiene including cough, deep breathe, Incentive Spirometry  - Assess the need for suctioning and aspirate as needed  - Assess and instruct to report SOB or any respiratory difficulty  - Respiratory Therapy support as indicated  Outcome: Progressing     Problem: PAIN - ADULT  Goal: Verbalizes/displays adequate comfort level or baseline comfort level  Description  Interventions:  - Encourage patient to monitor pain and request assistance  - Assess pain using appropriate pain scale  - Administer analgesics based on type and severity of pain and evaluate response  - Implement non-pharmacological measures as appropriate and evaluate response  - Consider cultural and social influences on pain and pain management  - Notify physician/advanced practitioner if interventions unsuccessful or patient reports new pain  Outcome: Progressing     Problem: INFECTION - ADULT  Goal: Absence or prevention of progression during hospitalization  Description  INTERVENTIONS:  - Assess and monitor for signs and symptoms of infection  - Monitor lab/diagnostic results  - Monitor all insertion sites, i e  indwelling lines, tubes, and drains  - Monitor endotracheal if appropriate and nasal secretions for changes in amount and color  - [place] appropriate cooling/warming therapies per order  - Administer medications as ordered  - Instruct and encourage patient and family to use good hand hygiene technique  - Identify and instruct in appropriate isolation precautions for identified infection/condition  Outcome: Progressing  Goal: Absence of fever/infection during neutropenic period  Description  INTERVENTIONS:  - Monitor WBC    Outcome: Progressing     Problem: SAFETY ADULT  Goal: Patient will remain free of falls  Description  INTERVENTIONS:  - Assess patient frequently for physical needs  -  Identify cognitive and physical deficits and behaviors that affect risk of falls    -  [place] fall precautions as indicated by assessment   - Educate patient/family on patient safety including physical limitations  - Instruct patient to call for assistance with activity based on assessment  - Modify environment to reduce risk of injury  - Consider OT/PT consult to assist with strengthening/mobility  Outcome: Progressing  Goal: Maintain or return to baseline ADL function  Description  INTERVENTIONS:  -  Assess patient's ability to carry out ADLs; assess patient's baseline for ADL function and identify physical deficits which impact ability to perform ADLs (bathing, care of mouth/teeth, toileting, grooming, dressing, etc )  - Assess/evaluate cause of self-care deficits   - Assess range of motion  - Assess patient's mobility; develop plan if impaired  - Assess patient's need for assistive devices and provide as appropriate  - Encourage maximum independence but intervene and supervise when necessary  - Involve family in performance of ADLs  - Assess for home care needs following discharge   - Consider OT consult to assist with ADL evaluation and planning for discharge  - Provide patient education as appropriate  Outcome: Progressing  Goal: Maintain or return mobility status to optimal level  Description  INTERVENTIONS:  - Assess patient's baseline mobility status (ambulation, transfers, stairs, etc )    - Identify cognitive and physical deficits and behaviors that affect mobility  - Identify mobility aids required to assist with transfers and/or ambulation (gait belt, sit-to-stand, lift, walker, cane, etc )  - [place] fall precautions as indicated by assessment  - Record patient progress and toleration of activity level on Mobility SBAR; progress patient to next Phase/Stage  - Instruct patient to call for assistance with activity based on assessment  - Consider rehabilitation consult to assist with strengthening/weightbearing, etc   Outcome: Progressing     Problem: DISCHARGE PLANNING  Goal: Discharge to home or other facility with appropriate resources  Description  INTERVENTIONS:  - Identify barriers to discharge w/patient and caregiver  - Arrange for needed discharge resources and transportation as appropriate  - Identify discharge learning needs (meds, wound care, etc )  - Arrange for interpretive services to assist at discharge as needed  - Refer to Case Management Department for coordinating discharge planning if the patient needs post-hospital services based on physician/advanced practitioner order or complex needs related to functional status, cognitive ability, or social support system  Outcome: Progressing     Problem: Knowledge Deficit  Goal: Patient/family/caregiver demonstrates understanding of disease process, treatment plan, medications, and discharge instructions  Description  Complete learning assessment and assess knowledge base    Interventions:  - Provide teaching at level of understanding  - Provide teaching via preferred learning methods  Outcome: Progressing

## 2019-12-03 NOTE — ASSESSMENT & PLAN NOTE
· K+ 3 1, persistent  · Will place on KDUR 40 mEq and IV potassium 20 mEq now   · Monitor BMP in the AM
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
## 2019-12-03 NOTE — ASSESSMENT & PLAN NOTE
· Pt reports diagnosis of pneumonia that was treated outpatient with levaquin and amoxicillin by PCP in September, since that time continues to have persistent cough and shortness of breath   · Repeat CT on 11/13 without cardiopulmonary disease  · CTA on admission negative for PE, lower lobe bilateral pneumonia noted concern for possible aspiration   · Pt denies any dysphagia symptoms or recent vomiting  · Recent weight loss, rapid HIV panel negative  · Pulmonary consult is pending   · Continue IV ceftriaxone/azithromycin with flagyl coverage  · SPEECH recommending regular diet and thin liquids, consider VBS  · Sputum culture/Strep pneumo/legionella pending  · Influenza swab negative  · Flonase, Advair inhaler, neb treatments   · Hold off on any steroid management for now pending pulmonary evaluation

## 2019-12-03 NOTE — UTILIZATION REVIEW
Notification of Inpatient Admission/Inpatient Authorization Request   This is a Notification of Inpatient Admission for [address]  Be advised that this patient was admitted to our facility under Inpatient Status  Contact Ira Perez at [phone] for additional admission information  [facility] DEDICATED Tyler Elizabeth [phone]  Patient Name:   Jessica Orlando   YOB: 1965       [address]   [address]:   [address]   Tax ID: [id number]  NPI: [id number] Attending Provider/NPI: Timmy Estrella, [address] [[id number]]   Place of Service Code: 24     Place of Service Name:  [address] [place] [address]   Start Date: 12/2/19 1942     Discharge Date & Time: No discharge date for patient encounter  Type of Admission: Inpatient Status Discharge Disposition   (if discharged): Home/Self Care   Patient Diagnoses: Cough [R05]  Pneumonia [J18 9]     Orders: Admission Orders (From admission, onward)     Ordered        12/02/19 1942  Inpatient Admission  Once                    Assigned Utilization Review Contact: Ira Perez  Utilization   Network Utilization Review Department  Phone: [phone]; Fax [phone]  Email: [email or web address]   ATTENTION PAYERS: Please call the assigned Utilization  directly with any questions or concerns ALL voicemails in the department are confidential  Send all requests for admission clinical reviews, approved or denied determinations and any other requests to dedicated fax number belonging to the campus where the patient is receiving treatment
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
## 2019-12-04 LAB
ANION GAP SERPL CALCULATED.3IONS-SCNC: 10 MMOL/L (ref 4–13)
BUN SERPL-MCNC: 13 MG/DL (ref 5–25)
C3 SERPL-MCNC: 139 MG/DL (ref 90–180)
C4 SERPL-MCNC: 17 MG/DL (ref 10–40)
CALCIUM SERPL-MCNC: 8.3 MG/DL (ref 8.3–10.1)
CHLORIDE SERPL-SCNC: 107 MMOL/L (ref 100–108)
CO2 SERPL-SCNC: 22 MMOL/L (ref 21–32)
CREAT SERPL-MCNC: 1.08 MG/DL (ref 0.6–1.3)
ERYTHROCYTE [DISTWIDTH] IN BLOOD BY AUTOMATED COUNT: 13.5 % (ref 11.6–15.1)
GFR SERPL CREATININE-BSD FRML MDRD: 77 ML/MIN/1.73SQ M
GLUCOSE SERPL-MCNC: 86 MG/DL (ref 65–140)
HCT VFR BLD AUTO: 46.7 % (ref 36.5–49.3)
HGB BLD-MCNC: 15.8 G/DL (ref 12–17)
IGA SERPL-MCNC: 124 MG/DL (ref 70–400)
IGG SERPL-MCNC: 1260 MG/DL (ref 700–1600)
IGM SERPL-MCNC: 108 MG/DL (ref 40–230)
MCH RBC QN AUTO: 30.9 PG (ref 26.8–34.3)
MCHC RBC AUTO-ENTMCNC: 33.8 G/DL (ref 31.4–37.4)
MCV RBC AUTO: 91 FL (ref 82–98)
PLATELET # BLD AUTO: 151 THOUSANDS/UL (ref 149–390)
PMV BLD AUTO: 10.4 FL (ref 8.9–12.7)
POTASSIUM SERPL-SCNC: 3.6 MMOL/L (ref 3.5–5.3)
RBC # BLD AUTO: 5.11 MILLION/UL (ref 3.88–5.62)
SODIUM SERPL-SCNC: 139 MMOL/L (ref 136–145)
TOTAL IGE SMQN RAST: 142 KU/L (ref 0–113)
WBC # BLD AUTO: 8.86 THOUSAND/UL (ref 4.31–10.16)

## 2019-12-04 PROCEDURE — 99232 SBSQ HOSP IP/OBS MODERATE 35: CPT | Performed by: NURSE PRACTITIONER

## 2019-12-04 PROCEDURE — 85027 COMPLETE CBC AUTOMATED: CPT | Performed by: PHYSICIAN ASSISTANT

## 2019-12-04 PROCEDURE — 80048 BASIC METABOLIC PNL TOTAL CA: CPT | Performed by: PHYSICIAN ASSISTANT

## 2019-12-04 PROCEDURE — 94760 N-INVAS EAR/PLS OXIMETRY 1: CPT

## 2019-12-04 PROCEDURE — 99232 SBSQ HOSP IP/OBS MODERATE 35: CPT | Performed by: INTERNAL MEDICINE

## 2019-12-04 PROCEDURE — 94640 AIRWAY INHALATION TREATMENT: CPT

## 2019-12-04 RX ORDER — IPRATROPIUM BROMIDE AND ALBUTEROL SULFATE 2.5; .5 MG/3ML; MG/3ML
3 SOLUTION RESPIRATORY (INHALATION)
Status: DISCONTINUED | OUTPATIENT
Start: 2019-12-04 | End: 2019-12-05

## 2019-12-04 RX ORDER — ECHINACEA PURPUREA EXTRACT 125 MG
1 TABLET ORAL
Status: DISCONTINUED | OUTPATIENT
Start: 2019-12-04 | End: 2019-12-05 | Stop reason: HOSPADM

## 2019-12-04 RX ADMIN — IPRATROPIUM BROMIDE AND ALBUTEROL SULFATE 3 ML: 2.5; .5 SOLUTION RESPIRATORY (INHALATION) at 13:24

## 2019-12-04 RX ADMIN — METRONIDAZOLE 500 MG: 500 TABLET ORAL at 21:12

## 2019-12-04 RX ADMIN — METOPROLOL SUCCINATE 25 MG: 25 TABLET, EXTENDED RELEASE ORAL at 09:16

## 2019-12-04 RX ADMIN — METRONIDAZOLE 500 MG: 500 TABLET ORAL at 05:57

## 2019-12-04 RX ADMIN — IPRATROPIUM BROMIDE AND ALBUTEROL SULFATE 3 ML: 2.5; .5 SOLUTION RESPIRATORY (INHALATION) at 07:23

## 2019-12-04 RX ADMIN — IPRATROPIUM BROMIDE AND ALBUTEROL SULFATE 3 ML: 2.5; .5 SOLUTION RESPIRATORY (INHALATION) at 20:05

## 2019-12-04 RX ADMIN — SODIUM CHLORIDE 75 ML/HR: 0.9 INJECTION, SOLUTION INTRAVENOUS at 18:36

## 2019-12-04 RX ADMIN — LISINOPRIL 20 MG: 20 TABLET ORAL at 09:16

## 2019-12-04 RX ADMIN — Medication 1 TABLET: at 09:16

## 2019-12-04 RX ADMIN — FLUTICASONE PROPIONATE 1 SPRAY: 50 SPRAY, METERED NASAL at 09:17

## 2019-12-04 RX ADMIN — AMLODIPINE BESYLATE 10 MG: 10 TABLET ORAL at 09:16

## 2019-12-04 RX ADMIN — SODIUM CHLORIDE 75 ML/HR: 0.9 INJECTION, SOLUTION INTRAVENOUS at 06:42

## 2019-12-04 RX ADMIN — ASPIRIN 325 MG ORAL TABLET 325 MG: 325 PILL ORAL at 05:57

## 2019-12-04 RX ADMIN — GUAIFENESIN 600 MG: 600 TABLET ORAL at 17:05

## 2019-12-04 RX ADMIN — SALINE NASAL SPRAY 1 SPRAY: 1.5 SOLUTION NASAL at 21:50

## 2019-12-04 RX ADMIN — METRONIDAZOLE 500 MG: 500 TABLET ORAL at 15:59

## 2019-12-04 RX ADMIN — GUAIFENESIN 600 MG: 600 TABLET ORAL at 09:16

## 2019-12-04 RX ADMIN — ACETAMINOPHEN 650 MG: 325 TABLET, FILM COATED ORAL at 16:05

## 2019-12-04 RX ADMIN — OXYCODONE HYDROCHLORIDE AND ACETAMINOPHEN 500 MG: 500 TABLET ORAL at 09:16

## 2019-12-04 RX ADMIN — CEFTRIAXONE SODIUM 1000 MG: 10 INJECTION, POWDER, FOR SOLUTION INTRAVENOUS at 21:13

## 2019-12-04 RX ADMIN — ENOXAPARIN SODIUM 40 MG: 40 INJECTION SUBCUTANEOUS at 09:17

## 2019-12-04 NOTE — ASSESSMENT & PLAN NOTE
· POA as evidenced by fever, tachycardia, leukocytosis with evidence of lower lobe pneumonia on CT scan, improving  · Blood cultures negative for 24 hours  · Continue IV ceftriaxone and p o   Flagyl, discontinued azithromycin  · IVF hydration    · Influenza swab negative  · Preliminary Sputum culture showing multiple organisms, will reincubate/urine antigens are negative  · Supportive care with antipyretics   · Continue to monitor temps/WBC closely

## 2019-12-04 NOTE — PROGRESS NOTES
Progress Note - Pulmonary   Mik Terrazas 47 y o  male MRN: [id number]  Unit/Bed#: -01 Encounter: [id number]    Assessment:  Mucopurulent bronchitis- sputum culture = mixed cristina  Recurrent pneumonia- no evidence of common variable immune deficiency syndrome  COPD  Obstructive sleep apnea  Unintended weight gain  Chronic low back pain  Hypertension    Plan:  Mucolytic therapy  Broad-spectrum antibiotics- continue Rocephin- discontinue Flagyl  Outpatient sleep study and pulmonary function study  Chest x-ray follow-up  Home environment adjustment to reduce allergen exposure    With elevated IgE levels, there is concern for allergen exposure  A complete allergy panel and skin testing should be considered    Transition to oral antibiotics tomorrow  Discharge planning in effect    Chief Complaint:   Cough    Subjective: There is persistent cough with purulence sputum production  No reports of hemoptysis  There is mild shortness of breath  He denies wheezing or chest tightness  There is no fever, chills or night sweats  He denies chest pain, palpitations, or diaphoresis  There has been some nasal congestion and postnasal drip  He denies headache or lightheadedness  Objective:     Vitals: Blood pressure 134/77, pulse 86, temperature 98 4 °F (36 9 °C), temperature source Oral, resp  rate 20, height 5' 7" (1 702 m), weight 89 8 kg (198 lb), SpO2 94 %  ,Body mass index is 31 01 kg/m²        Intake/Output Summary (Last 24 hours) at 12/4/2019 1404  Last data filed at 12/4/2019 0723  Gross per 24 hour   Intake 740 ml   Output    Net 740 ml       Invasive Devices     Peripheral Intravenous Line            Peripheral IV 11/13/19 Left Antecubital 20 days    Peripheral IV 12/02/19 Right Antecubital 1 day          Epidural Line            Nerve Block Catheter 08/22/18 469 days                Physical Exam: /77 (BP Location: Left arm)   Pulse 86   Temp 98 4 °F (36 9 °C) (Oral)   Resp 20   Ht 5' 7" (1 702 m)   Wt 89 8 kg (198 lb)   SpO2 94%   BMI 31 01 kg/m²     General Appearance:    Alert, cooperative, no distress, appears stated age   Head:    Normocephalic, without obvious abnormality, atraumatic   Eyes:    PERRL, conjunctiva is pink  Sclera is white  Ears:    Normal    Nose:   Nares normal, septum midline, mucosa normal, no drainage    or sinus tenderness   Throat:   Lips, mucosa, and tongue normal; teeth and gums normal   Neck:   Supple, symmetrical, trachea midline, no adenopathy;        thyroid:  No enlargement/tenderness/nodules; no carotid    bruit or JVD   Back:     Symmetric, no curvature, no CVA tenderness   Lungs:     End expiratory rhonchi bilaterally   Chest wall:    No tenderness or deformity   Heart:    Regular rate and rhythm, S1 and S2 normal, no murmur, rub   or gallop   Abdomen:     Soft, non-tender, bowel sounds active all four quadrants,     no masses, no organomegaly           Extremities:   Extremities normal, atraumatic, no cyanosis or edema   Pulses:   2+ and symmetric all extremities   Skin:   Skin color, texture, turgor normal, no rashes or lesions   Lymph nodes:   Cervical, supraclavicular, and axillary nodes normal   Neurologic:   CNII-XII intact   Normal strength, sensation and reflexes               Labs:     Sputum Culture Culture too young- will reincubate              GRAM STAIN RESULT  Abnormal    1+ Polys      1+ Epithelial Cells      1+ Gram positive cocci in pairs      1+ Gram negative diplococci      1+ Gram negative rods           WBC 4 31 - 10 16 Thousand/uL 8 86    RBC 3 88 - 5 62 Million/uL 5 11    Hemoglobin 12 0 - 17 0 g/dL 15 8    Hematocrit 36 5 - 49 3 % 46 7    MCV 82 - 98 fL 91    MCH 26 8 - 34 3 pg 30 9    MCHC 31 4 - 37 4 g/dL 33 8    RDW 11 6 - 15 1 % 13 5    Platelets 804 - 371 Thousands/uL 151      Sodium 136 - 145 mmol/L 139    Potassium 3 5 - 5 3 mmol/L 3 6    Chloride 100 - 108 mmol/L 107    CO2 21 - 32 mmol/L 22    ANION GAP 4 - 13 mmol/L 10 BUN 5 - 25 mg/dL 13    Creatinine 0 60 - 1 30 mg/dL 1 08    Comment: Standardized to IDMS reference method   Glucose 65 - 140 mg/dL 86      IgE 0 - 113 kU/l 142High        Procalcitonin <=0 25 ng/ml 0 11            Imaging and other studies:  No new radiographic studies

## 2019-12-04 NOTE — PLAN OF CARE
Problem: RESPIRATORY - ADULT  Goal: Achieves optimal ventilation and oxygenation  Description  INTERVENTIONS:  - Assess for changes in respiratory status  - Assess for changes in mentation and behavior  - Position to facilitate oxygenation and minimize respiratory effort  - Oxygen administered by appropriate delivery if ordered  - Initiate smoking cessation education as indicated  - Encourage broncho-pulmonary hygiene including cough, deep breathe, Incentive Spirometry  - Assess the need for suctioning and aspirate as needed  - Assess and instruct to report SOB or any respiratory difficulty  - Respiratory Therapy support as indicated  Outcome: Progressing     Problem: PAIN - ADULT  Goal: Verbalizes/displays adequate comfort level or baseline comfort level  Description  Interventions:  - Encourage patient to monitor pain and request assistance  - Assess pain using appropriate pain scale  - Administer analgesics based on type and severity of pain and evaluate response  - Implement non-pharmacological measures as appropriate and evaluate response  - Consider cultural and social influences on pain and pain management  - Notify physician/advanced practitioner if interventions unsuccessful or patient reports new pain  Outcome: Progressing     Problem: INFECTION - ADULT  Goal: Absence or prevention of progression during hospitalization  Description  INTERVENTIONS:  - Assess and monitor for signs and symptoms of infection  - Monitor lab/diagnostic results  - Monitor all insertion sites, i e  indwelling lines, tubes, and drains  - Monitor endotracheal if appropriate and nasal secretions for changes in amount and color  - [place] appropriate cooling/warming therapies per order  - Administer medications as ordered  - Instruct and encourage patient and family to use good hand hygiene technique  - Identify and instruct in appropriate isolation precautions for identified infection/condition  Outcome: Progressing  Goal: Absence of fever/infection during neutropenic period  Description  INTERVENTIONS:  - Monitor WBC    Outcome: Progressing     Problem: SAFETY ADULT  Goal: Patient will remain free of falls  Description  INTERVENTIONS:  - Assess patient frequently for physical needs  -  Identify cognitive and physical deficits and behaviors that affect risk of falls    -  [place] fall precautions as indicated by assessment   - Educate patient/family on patient safety including physical limitations  - Instruct patient to call for assistance with activity based on assessment  - Modify environment to reduce risk of injury  - Consider OT/PT consult to assist with strengthening/mobility  Outcome: Progressing  Goal: Maintain or return to baseline ADL function  Description  INTERVENTIONS:  -  Assess patient's ability to carry out ADLs; assess patient's baseline for ADL function and identify physical deficits which impact ability to perform ADLs (bathing, care of mouth/teeth, toileting, grooming, dressing, etc )  - Assess/evaluate cause of self-care deficits   - Assess range of motion  - Assess patient's mobility; develop plan if impaired  - Assess patient's need for assistive devices and provide as appropriate  - Encourage maximum independence but intervene and supervise when necessary  - Involve family in performance of ADLs  - Assess for home care needs following discharge   - Consider OT consult to assist with ADL evaluation and planning for discharge  - Provide patient education as appropriate  Outcome: Progressing  Goal: Maintain or return mobility status to optimal level  Description  INTERVENTIONS:  - Assess patient's baseline mobility status (ambulation, transfers, stairs, etc )    - Identify cognitive and physical deficits and behaviors that affect mobility  - Identify mobility aids required to assist with transfers and/or ambulation (gait belt, sit-to-stand, lift, walker, cane, etc )  - [place] fall precautions as indicated by assessment  - Record patient progress and toleration of activity level on Mobility SBAR; progress patient to next Phase/Stage  - Instruct patient to call for assistance with activity based on assessment  - Consider rehabilitation consult to assist with strengthening/weightbearing, etc   Outcome: Progressing     Problem: DISCHARGE PLANNING  Goal: Discharge to home or other facility with appropriate resources  Description  INTERVENTIONS:  - Identify barriers to discharge w/patient and caregiver  - Arrange for needed discharge resources and transportation as appropriate  - Identify discharge learning needs (meds, wound care, etc )  - Arrange for interpretive services to assist at discharge as needed  - Refer to Case Management Department for coordinating discharge planning if the patient needs post-hospital services based on physician/advanced practitioner order or complex needs related to functional status, cognitive ability, or social support system  Outcome: Progressing     Problem: Knowledge Deficit  Goal: Patient/family/caregiver demonstrates understanding of disease process, treatment plan, medications, and discharge instructions  Description  Complete learning assessment and assess knowledge base  Interventions:  - Provide teaching at level of understanding  - Provide teaching via preferred learning methods  Outcome: Progressing     Problem: Nutrition/Hydration-ADULT  Goal: Nutrient/Hydration intake appropriate for improving, restoring or maintaining nutritional needs  Description  Monitor and assess patient's nutrition/hydration status for malnutrition  Collaborate with interdisciplinary team and initiate plan and interventions as ordered  Monitor patient's weight and dietary intake as ordered or per policy  Utilize nutrition screening tool and intervene as necessary  Determine patient's food preferences and provide high-protein, high-caloric foods as appropriate       INTERVENTIONS:  - Monitor oral intake, urinary output, labs, and treatment plans  - Assess nutrition and hydration status and recommend course of action  - Evaluate amount of meals eaten  - Assist patient with eating if necessary   - Allow adequate time for meals  - Recommend/ encourage appropriate diets, oral nutritional supplements, and vitamin/mineral supplements  - Order, calculate, and assess calorie counts as needed  - Recommend, monitor, and adjust tube feedings and TPN/PPN based on assessed needs  - Assess need for intravenous fluids  - Provide specific nutrition/hydration education as appropriate  - Include patient/family/caregiver in decisions related to nutrition  Outcome: Progressing

## 2019-12-05 VITALS
HEART RATE: 70 BPM | SYSTOLIC BLOOD PRESSURE: 139 MMHG | BODY MASS INDEX: 31.08 KG/M2 | HEIGHT: 67 IN | DIASTOLIC BLOOD PRESSURE: 90 MMHG | TEMPERATURE: 97.9 F | WEIGHT: 198 LBS | RESPIRATION RATE: 18 BRPM | OXYGEN SATURATION: 94 %

## 2019-12-05 DIAGNOSIS — Z71.89 COMPLEX CARE COORDINATION: Primary | ICD-10-CM

## 2019-12-05 LAB
ANION GAP SERPL CALCULATED.3IONS-SCNC: 11 MMOL/L (ref 4–13)
ATRIAL RATE: 101 BPM
BACTERIA SPT RESP CULT: ABNORMAL
BACTERIA SPT RESP CULT: ABNORMAL
BUN SERPL-MCNC: 12 MG/DL (ref 5–25)
CALCIUM SERPL-MCNC: 8.4 MG/DL (ref 8.3–10.1)
CH50 SERPL-ACNC: 59 U/ML (ref 42–999999)
CHLORIDE SERPL-SCNC: 110 MMOL/L (ref 100–108)
CO2 SERPL-SCNC: 22 MMOL/L (ref 21–32)
CREAT SERPL-MCNC: 1.04 MG/DL (ref 0.6–1.3)
ERYTHROCYTE [DISTWIDTH] IN BLOOD BY AUTOMATED COUNT: 13.6 % (ref 11.6–15.1)
GFR SERPL CREATININE-BSD FRML MDRD: 81 ML/MIN/1.73SQ M
GLUCOSE SERPL-MCNC: 81 MG/DL (ref 65–140)
GRAM STN SPEC: ABNORMAL
HCT VFR BLD AUTO: 43.1 % (ref 36.5–49.3)
HGB BLD-MCNC: 14.5 G/DL (ref 12–17)
MCH RBC QN AUTO: 30.7 PG (ref 26.8–34.3)
MCHC RBC AUTO-ENTMCNC: 33.6 G/DL (ref 31.4–37.4)
MCV RBC AUTO: 91 FL (ref 82–98)
P AXIS: 56 DEGREES
PLATELET # BLD AUTO: 163 THOUSANDS/UL (ref 149–390)
PMV BLD AUTO: 10.5 FL (ref 8.9–12.7)
POTASSIUM SERPL-SCNC: 3.2 MMOL/L (ref 3.5–5.3)
PR INTERVAL: 138 MS
QRS AXIS: 91 DEGREES
QRSD INTERVAL: 82 MS
QT INTERVAL: 350 MS
QTC INTERVAL: 453 MS
RBC # BLD AUTO: 4.73 MILLION/UL (ref 3.88–5.62)
SODIUM SERPL-SCNC: 143 MMOL/L (ref 136–145)
T WAVE AXIS: -27 DEGREES
VENTRICULAR RATE: 101 BPM
WBC # BLD AUTO: 6.16 THOUSAND/UL (ref 4.31–10.16)

## 2019-12-05 PROCEDURE — 85027 COMPLETE CBC AUTOMATED: CPT | Performed by: NURSE PRACTITIONER

## 2019-12-05 PROCEDURE — 94760 N-INVAS EAR/PLS OXIMETRY 1: CPT

## 2019-12-05 PROCEDURE — 93010 ELECTROCARDIOGRAM REPORT: CPT | Performed by: INTERNAL MEDICINE

## 2019-12-05 PROCEDURE — 99232 SBSQ HOSP IP/OBS MODERATE 35: CPT | Performed by: INTERNAL MEDICINE

## 2019-12-05 PROCEDURE — 80048 BASIC METABOLIC PNL TOTAL CA: CPT | Performed by: NURSE PRACTITIONER

## 2019-12-05 PROCEDURE — 94640 AIRWAY INHALATION TREATMENT: CPT

## 2019-12-05 PROCEDURE — 99238 HOSP IP/OBS DSCHRG MGMT 30/<: CPT | Performed by: NURSE PRACTITIONER

## 2019-12-05 RX ORDER — METRONIDAZOLE 500 MG/1
500 TABLET ORAL EVERY 8 HOURS SCHEDULED
Qty: 12 TABLET | Refills: 0 | Status: SHIPPED | OUTPATIENT
Start: 2019-12-05 | End: 2019-12-09

## 2019-12-05 RX ORDER — CEFDINIR 300 MG/1
300 CAPSULE ORAL EVERY 12 HOURS SCHEDULED
Qty: 8 CAPSULE | Refills: 0 | Status: SHIPPED | OUTPATIENT
Start: 2019-12-05 | End: 2019-12-09

## 2019-12-05 RX ORDER — ACETAMINOPHEN 325 MG/1
650 TABLET ORAL EVERY 6 HOURS PRN
Qty: 30 TABLET | Refills: 0 | Status: SHIPPED | OUTPATIENT
Start: 2019-12-05 | End: 2022-03-02 | Stop reason: DRUGHIGH

## 2019-12-05 RX ORDER — BENZONATATE 100 MG/1
100 CAPSULE ORAL 3 TIMES DAILY PRN
Status: DISCONTINUED | OUTPATIENT
Start: 2019-12-05 | End: 2019-12-05 | Stop reason: HOSPADM

## 2019-12-05 RX ORDER — BUTALBITAL, ACETAMINOPHEN AND CAFFEINE 50; 325; 40 MG/1; MG/1; MG/1
1 TABLET ORAL ONCE
Status: COMPLETED | OUTPATIENT
Start: 2019-12-05 | End: 2019-12-05

## 2019-12-05 RX ORDER — BENZONATATE 100 MG/1
100 CAPSULE ORAL 3 TIMES DAILY PRN
Qty: 20 CAPSULE | Refills: 0 | Status: SHIPPED | OUTPATIENT
Start: 2019-12-05 | End: 2019-12-11

## 2019-12-05 RX ORDER — LEVALBUTEROL 1.25 MG/.5ML
1.25 SOLUTION, CONCENTRATE RESPIRATORY (INHALATION)
Status: DISCONTINUED | OUTPATIENT
Start: 2019-12-05 | End: 2019-12-05 | Stop reason: HOSPADM

## 2019-12-05 RX ORDER — POTASSIUM CHLORIDE 20 MEQ/1
40 TABLET, EXTENDED RELEASE ORAL ONCE
Status: COMPLETED | OUTPATIENT
Start: 2019-12-05 | End: 2019-12-05

## 2019-12-05 RX ADMIN — LISINOPRIL 20 MG: 20 TABLET ORAL at 09:06

## 2019-12-05 RX ADMIN — ACETAMINOPHEN 650 MG: 325 TABLET, FILM COATED ORAL at 00:01

## 2019-12-05 RX ADMIN — Medication 1 TABLET: at 09:06

## 2019-12-05 RX ADMIN — SODIUM CHLORIDE 75 ML/HR: 0.9 INJECTION, SOLUTION INTRAVENOUS at 09:03

## 2019-12-05 RX ADMIN — METOPROLOL SUCCINATE 25 MG: 25 TABLET, EXTENDED RELEASE ORAL at 09:06

## 2019-12-05 RX ADMIN — IPRATROPIUM BROMIDE 0.5 MG: 0.5 SOLUTION RESPIRATORY (INHALATION) at 13:14

## 2019-12-05 RX ADMIN — LEVALBUTEROL HYDROCHLORIDE 1.25 MG: 1.25 SOLUTION, CONCENTRATE RESPIRATORY (INHALATION) at 13:14

## 2019-12-05 RX ADMIN — ENOXAPARIN SODIUM 40 MG: 40 INJECTION SUBCUTANEOUS at 09:06

## 2019-12-05 RX ADMIN — AMLODIPINE BESYLATE 10 MG: 10 TABLET ORAL at 09:06

## 2019-12-05 RX ADMIN — BUTALBITAL, ACETAMINOPHEN, AND CAFFEINE 1 TABLET: 50; 325; 40 TABLET ORAL at 12:00

## 2019-12-05 RX ADMIN — METRONIDAZOLE 500 MG: 500 TABLET ORAL at 05:06

## 2019-12-05 RX ADMIN — GUAIFENESIN 600 MG: 600 TABLET ORAL at 09:06

## 2019-12-05 RX ADMIN — FLUTICASONE PROPIONATE 1 SPRAY: 50 SPRAY, METERED NASAL at 09:08

## 2019-12-05 RX ADMIN — IPRATROPIUM BROMIDE AND ALBUTEROL SULFATE 3 ML: 2.5; .5 SOLUTION RESPIRATORY (INHALATION) at 07:19

## 2019-12-05 RX ADMIN — ASPIRIN 325 MG ORAL TABLET 325 MG: 325 PILL ORAL at 05:06

## 2019-12-05 RX ADMIN — POTASSIUM CHLORIDE 40 MEQ: 1500 TABLET, EXTENDED RELEASE ORAL at 14:30

## 2019-12-05 RX ADMIN — METRONIDAZOLE 500 MG: 500 TABLET ORAL at 14:36

## 2019-12-05 RX ADMIN — BENZONATATE 100 MG: 100 CAPSULE ORAL at 12:00

## 2019-12-05 RX ADMIN — ACETAMINOPHEN 650 MG: 325 TABLET, FILM COATED ORAL at 11:33

## 2019-12-05 RX ADMIN — OXYCODONE HYDROCHLORIDE AND ACETAMINOPHEN 500 MG: 500 TABLET ORAL at 09:06

## 2019-12-05 NOTE — PLAN OF CARE
Problem: RESPIRATORY - ADULT  Goal: Achieves optimal ventilation and oxygenation  Description  INTERVENTIONS:  - Assess for changes in respiratory status  - Assess for changes in mentation and behavior  - Position to facilitate oxygenation and minimize respiratory effort  - Oxygen administered by appropriate delivery if ordered  - Initiate smoking cessation education as indicated  - Encourage broncho-pulmonary hygiene including cough, deep breathe, Incentive Spirometry  - Assess the need for suctioning and aspirate as needed  - Assess and instruct to report SOB or any respiratory difficulty  - Respiratory Therapy support as indicated  Outcome: Progressing     Problem: PAIN - ADULT  Goal: Verbalizes/displays adequate comfort level or baseline comfort level  Description  Interventions:  - Encourage patient to monitor pain and request assistance  - Assess pain using appropriate pain scale  - Administer analgesics based on type and severity of pain and evaluate response  - Implement non-pharmacological measures as appropriate and evaluate response  - Consider cultural and social influences on pain and pain management  - Notify physician/advanced practitioner if interventions unsuccessful or patient reports new pain  Outcome: Progressing     Problem: INFECTION - ADULT  Goal: Absence or prevention of progression during hospitalization  Description  INTERVENTIONS:  - Assess and monitor for signs and symptoms of infection  - Monitor lab/diagnostic results  - Monitor all insertion sites, i e  indwelling lines, tubes, and drains  - Monitor endotracheal if appropriate and nasal secretions for changes in amount and color  - [place] appropriate cooling/warming therapies per order  - Administer medications as ordered  - Instruct and encourage patient and family to use good hand hygiene technique  - Identify and instruct in appropriate isolation precautions for identified infection/condition  Outcome: Progressing  Goal: Absence of fever/infection during neutropenic period  Description  INTERVENTIONS:  - Monitor WBC    Outcome: Progressing     Problem: SAFETY ADULT  Goal: Patient will remain free of falls  Description  INTERVENTIONS:  - Assess patient frequently for physical needs  -  Identify cognitive and physical deficits and behaviors that affect risk of falls    -  [place] fall precautions as indicated by assessment   - Educate patient/family on patient safety including physical limitations  - Instruct patient to call for assistance with activity based on assessment  - Modify environment to reduce risk of injury  - Consider OT/PT consult to assist with strengthening/mobility  Outcome: Progressing  Goal: Maintain or return to baseline ADL function  Description  INTERVENTIONS:  -  Assess patient's ability to carry out ADLs; assess patient's baseline for ADL function and identify physical deficits which impact ability to perform ADLs (bathing, care of mouth/teeth, toileting, grooming, dressing, etc )  - Assess/evaluate cause of self-care deficits   - Assess range of motion  - Assess patient's mobility; develop plan if impaired  - Assess patient's need for assistive devices and provide as appropriate  - Encourage maximum independence but intervene and supervise when necessary  - Involve family in performance of ADLs  - Assess for home care needs following discharge   - Consider OT consult to assist with ADL evaluation and planning for discharge  - Provide patient education as appropriate  Outcome: Progressing  Goal: Maintain or return mobility status to optimal level  Description  INTERVENTIONS:  - Assess patient's baseline mobility status (ambulation, transfers, stairs, etc )    - Identify cognitive and physical deficits and behaviors that affect mobility  - Identify mobility aids required to assist with transfers and/or ambulation (gait belt, sit-to-stand, lift, walker, cane, etc )  - [place] fall precautions as indicated by assessment  - Record patient progress and toleration of activity level on Mobility SBAR; progress patient to next Phase/Stage  - Instruct patient to call for assistance with activity based on assessment  - Consider rehabilitation consult to assist with strengthening/weightbearing, etc   Outcome: Progressing     Problem: DISCHARGE PLANNING  Goal: Discharge to home or other facility with appropriate resources  Description  INTERVENTIONS:  - Identify barriers to discharge w/patient and caregiver  - Arrange for needed discharge resources and transportation as appropriate  - Identify discharge learning needs (meds, wound care, etc )  - Arrange for interpretive services to assist at discharge as needed  - Refer to Case Management Department for coordinating discharge planning if the patient needs post-hospital services based on physician/advanced practitioner order or complex needs related to functional status, cognitive ability, or social support system  Outcome: Progressing     Problem: Knowledge Deficit  Goal: Patient/family/caregiver demonstrates understanding of disease process, treatment plan, medications, and discharge instructions  Description  Complete learning assessment and assess knowledge base  Interventions:  - Provide teaching at level of understanding  - Provide teaching via preferred learning methods  Outcome: Progressing     Problem: Nutrition/Hydration-ADULT  Goal: Nutrient/Hydration intake appropriate for improving, restoring or maintaining nutritional needs  Description  Monitor and assess patient's nutrition/hydration status for malnutrition  Collaborate with interdisciplinary team and initiate plan and interventions as ordered  Monitor patient's weight and dietary intake as ordered or per policy  Utilize nutrition screening tool and intervene as necessary  Determine patient's food preferences and provide high-protein, high-caloric foods as appropriate       INTERVENTIONS:  - Monitor oral intake, urinary output, labs, and treatment plans  - Assess nutrition and hydration status and recommend course of action  - Evaluate amount of meals eaten  - Assist patient with eating if necessary   - Allow adequate time for meals  - Recommend/ encourage appropriate diets, oral nutritional supplements, and vitamin/mineral supplements  - Order, calculate, and assess calorie counts as needed  - Recommend, monitor, and adjust tube feedings and TPN/PPN based on assessed needs  - Assess need for intravenous fluids  - Provide specific nutrition/hydration education as appropriate  - Include patient/family/caregiver in decisions related to nutrition  Outcome: Progressing

## 2019-12-05 NOTE — DISCHARGE INSTR - AVS FIRST PAGE
Thank you for choosing [facility], please take all prescriptions as instructed, please make appropriate follow-up visits    Stay hydrated using electrolyte lopez and drinks

## 2019-12-05 NOTE — ASSESSMENT & PLAN NOTE
· POA as evidenced by fever, tachycardia, leukocytosis with evidence of lower lobe pneumonia on CT scan, improving  · Blood cultures negative for 48 hours  · Transitioned to cefdinir and Flagyl for discharge  · Influenza swab negative  · Preliminary Sputum culture showing mixed respiratory cristina

## 2019-12-05 NOTE — ASSESSMENT & PLAN NOTE
· Pt reports diagnosis of pneumonia that was treated outpatient with levaquin and amoxicillin by PCP in September, since that time continues to have persistent cough and shortness of breath   · Repeat CT on 11/13 without cardiopulmonary disease  · CTA on admission negative for PE, lower lobe bilateral pneumonia noted concern for possible aspiration   · Pt denies any dysphagia symptoms or recent vomiting  · IgE level is elevated, patient reports that he has had inside of House painted in the last month  · Patient provided with follow-up information for pulmonary  · Transitioned to cefdinir and continue Flagyl on discharge  · SPEECH recommending regular diet and thin liquids, consider VBS outpatient  · Sputum culture/Strep pneumo/legionella pending  · Influenza swab negative  · Flonase, Advair inhaler, neb treatments

## 2019-12-05 NOTE — SOCIAL WORK
Lace 60  Not a readmission  Resides in [facility] his wife  Has support at home  No POA or advanced directive  Pt denied need for resources  ind w adls  Employed  No dme drives  Uses [facility] for RXs  Reports no issues obtaining his medications  No VNA hx  Cm discussed RN VNA due to his lace score  However, pt not meeting homebound criteria  Pt agreeable to [facility] appt, details in AVS  Pt agreeable to OP CM  Referral sent  Has ride home at d c no other anticipated d c needs at this time    CM reviewed discharge planning process including the following: identifying help at home, patient preference for discharge planning needs, pharmacy preference, and availability of treatment team to discuss questions or concerns patient and/or family may have regarding understanding medications and recognizing signs and symptoms once discharged  CM also encouraged patient to follow up with all recommended appointments after discharge  Patient advised of importance for patient and family to participate in managing patients medical well being  CM name and role reviewed  Discharge Checklist reviewed and CM will continue to monitor for progress toward discharge goals in nursing and provider rounds

## 2019-12-05 NOTE — DISCHARGE SUMMARY
[facility]  Discharge- Vernon Guerra 1965, 47 y o  male MRN: [id number]    Unit/Bed#: -01 Encounter: [id number]    Primary Care Provider: Celso Mcburney, DO   Date and time admitted to hospital: 12/2/2019  3:37 PM    * Recurrent pneumonia  Assessment & Plan  · Pt reports diagnosis of pneumonia that was treated outpatient with levaquin and amoxicillin by PCP in September, since that time continues to have persistent cough and shortness of breath   · Repeat CT on 11/13 without cardiopulmonary disease  · CTA on admission negative for PE, lower lobe bilateral pneumonia noted concern for possible aspiration   · Pt denies any dysphagia symptoms or recent vomiting  · IgE level is elevated, patient reports that he has had inside of House painted in the last month  · Patient provided with follow-up information for pulmonary  · Transitioned to cefdinir and continue Flagyl on discharge  · SPEECH recommending regular diet and thin liquids, consider VBS outpatient  · Sputum culture/Strep pneumo/legionella pending  · Influenza swab negative  · Flonase, Advair inhaler, neb treatments     Hypokalemia  Assessment & Plan  · Potassium 3 2 today  · Will supplement with 40 K-Dur today  · BMP in 1 week    Sepsis ([facility] [address] )  Assessment & Plan  · POA as evidenced by fever, tachycardia, leukocytosis with evidence of lower lobe pneumonia on CT scan, improving  · Blood cultures negative for 48 hours  · Transitioned to cefdinir and Flagyl for discharge  · Influenza swab negative  · Preliminary Sputum culture showing mixed respiratory cristina    Benign essential HTN  Assessment & Plan  · BP appears stable on review  · Continue amlodipine 10 mg daily, lisinopril 20 mg daily and Toprol XL 25 mg daily   · Monitor        Discharging Physician / Practitioner: MALIKA Garcia  PCP: Celso Mcburney, DO  Admission Date:   Admission Orders (From admission, onward)     Ordered        12/02/19 1942  Inpatient Admission  Once Discharge Date: 12/05/19    Resolved Problems  Date Reviewed: 12/3/2019    None          Consultations During Hospital Stay:  · Pulmonary    Procedures Performed:   · Chest CT    Significant Findings / Test Results:   1   No evidence of acute pulmonary embolus, thoracic aortic aneurysm or dissection  2   Few airspace opacities at the lung bases suggesting residual pneumonia and/or aspiration    Incidental Findings:   · None     Test Results Pending at Discharge (will require follow up): · None     Outpatient Tests Requested:  · BMP    Complications:  None    Reason for Admission:  Shortness of breath    Hospital Course:     Fauzia Alba is a 47 y o  male patient who originally presented to the hospital on 12/2/2019 due to shortness of breath  After further evaluation patient is found have pneumonia  He was treated with IV antibiotics his evaluated by Pulmonary  His IgE was elevated which could have been in the setting of recently getting his home pain id  He reports that he does have 3 dogs in the house however he has had them in the house for upwards of 2-5 years  He reports that he has a mild headache as improved with Fioricet  Any does still have a productive cough  Encouraged fluid and electrolyte intake, encouraged Mucinex and Tessalon Perles  He verbalizes understanding he is mildly resistant to going home today, however educated patient on the risk of staying in the hospital longer than needed any he agrees to the current plan of discharge in follow-up with his primary care provider he is ambulating the hallways without difficulty he is eating his meals without difficulty and he is resting comfortably in bed in chair without any noted distress    Please see above list of diagnoses and related plan for additional information       Condition at Discharge: stable     Discharge Day Visit / Exam:     Subjective:  Denies any chest pain chest tightness shortness of breath or difficulty breathing  He does report a severely productive cough however educated him that this may linger for upwards of the week  Vitals: Blood Pressure: 143/84 (12/05/19 0737)  Pulse: 86 (12/05/19 0737)  Temperature: 98 1 °F (36 7 °C) (12/05/19 0737)  Temp Source: Oral (12/05/19 0737)  Respirations: 18 (12/05/19 0737)  Height: 5' 7" (170 2 cm) (12/03/19 1306)  Weight - Scale: 89 8 kg (198 lb) (12/03/19 1305)  SpO2: 95 % (12/05/19 0737)  Exam:   Physical Exam   Constitutional: He is oriented to person, place, and time  He appears well-developed and well-nourished  No distress  HENT:   Head: Normocephalic  Eyes: Pupils are equal, round, and reactive to light  EOM are normal    Neck: Normal range of motion  Neck supple  Cardiovascular: Normal rate  Pulmonary/Chest: Effort normal and breath sounds normal  No respiratory distress  Abdominal: Soft  Bowel sounds are normal    Musculoskeletal: Normal range of motion  Neurological: He is alert and oriented to person, place, and time  Skin: Skin is warm and dry  Psychiatric: He has a normal mood and affect  His behavior is normal  Judgment and thought content normal    Nursing note and vitals reviewed  Discussion with Family:  Wife at bedside    Discharge instructions/Information to patient and family:   See after visit summary for information provided to patient and family  Provisions for Follow-Up Care:  See after visit summary for information related to follow-up care and any pertinent home health orders  Disposition:     Home    For Discharges to   [address] [facility]:   · Not Applicable to this Patient - Not Applicable to this Patient    Planned Readmission:  No     Discharge Statement:  I spent 30 minutes discharging the patient  This time was spent on the day of discharge  I had direct contact with the patient on the day of discharge   Greater than 50% of the total time was spent examining patient, answering all patient questions, arranging and discussing plan of care with patient as well as directly providing post-discharge instructions  Additional time then spent on discharge activities  Discharge Medications:  See after visit summary for reconciled discharge medications provided to patient and family        ** Please Note: This note has been constructed using a voice recognition system **

## 2019-12-05 NOTE — NURSING NOTE
Pt discharged to home with all belongings  AVS and follow-ups reviewed  Pt stated understanding  All questions answered at this time

## 2019-12-05 NOTE — PROGRESS NOTES
Progress Note - Pulmonary   Tammy Weathers 47 y o  male MRN: [id number]  Unit/Bed#: -01 Encounter: [id number]    Assessment:  Mucopurulent bronchitis  Repeated pneumonia  COPD  Obstructive sleep apnea  Essential hypertension  Elevated IgE    Plan:  Discharge to home  Maintain mucolytic therapy for at least 5 additional days  Albuterol inhaler p r n  Shortness of breath and chest tightness  It is likely that he will require a long-acting beta agonist and inhaled corticosteroid after review of pulmonary function studies  Transition to oral antibiotics for 5 days  I suggest that he follow up with Dr Rochelle Potts in 2 weeks  He needs outpatient PFT and sleep study    The elevated IgE is consistent with allergen exposure- not paint inhalation  There is consideration for allergy testing with a certified allergist/immunologist    Chief Complaint:   Cough    Subjective:   Cough is less frequent and now nonproductive of sputum  Shortness of breath is nearly resolved  There is no fever, chills, night sweats  He denies chest pain, palpitations, or diaphoresis  Discharge planning is in progress  Objective:     Vitals: Blood pressure 139/90, pulse 70, temperature 97 9 °F (36 6 °C), temperature source Oral, resp  rate 18, height 5' 7" (1 702 m), weight 89 8 kg (198 lb), SpO2 94 %  ,Body mass index is 31 01 kg/m²        Intake/Output Summary (Last 24 hours) at 12/5/2019 1519  Last data filed at 12/5/2019 0903  Gross per 24 hour   Intake 1480 ml   Output 1000 ml   Net 480 ml       Invasive Devices     Peripheral Intravenous Line            Peripheral IV 11/13/19 Left Antecubital 21 days    Peripheral IV 12/02/19 Right Antecubital 2 days          Epidural Line            Nerve Block Catheter 08/22/18 470 days                Physical Exam: /90 (BP Location: Left arm)   Pulse 70   Temp 97 9 °F (36 6 °C) (Oral)   Resp 18   Ht 5' 7" (1 702 m)   Wt 89 8 kg (198 lb)   SpO2 94%   BMI 31 01 kg/m²     General Appearance: Alert, cooperative, no distress, appears stated age   Head:    Normocephalic, without obvious abnormality, atraumatic   Eyes:    PERRL, conjunctiva pink  Sclera is white  Ears:    Normal hearing   Nose:   Nares normal, septum midline, mucosa normal, no drainage    or sinus tenderness   Throat:   Lips, mucosa, and tongue normal; teeth and gums normal   Neck:   Supple, symmetrical, trachea midline, no adenopathy;        thyroid:  No enlargement/tenderness/nodules; no carotid    bruit or JVD   Back:     Symmetric,  no CVA tenderness   Lungs:     End expiratory rhonchi   Chest wall:    No tenderness or deformity   Heart:    Regular rate and rhythm, S1 and S2 normal, no murmur, rub   or gallop   Abdomen:     Soft, non-tender, bowel sounds active all four quadrants,     no masses, no organomegaly           Extremities:   Extremities normal, atraumatic, no cyanosis or edema   Pulses:   2+ and symmetric all extremities   Skin:   Skin color, texture, turgor normal, no rashes or lesions   Lymph nodes:   Cervical, supraclavicular, and axillary nodes normal   Neurologic:   CNII-XII intact   Normal strength, sensation and reflexes               Labs:     WBC 4 31 - 10 16 Thousand/uL 6 16    RBC 3 88 - 5 62 Million/uL 4 73    Hemoglobin 12 0 - 17 0 g/dL 14 5    Hematocrit 36 5 - 49 3 % 43 1    MCV 82 - 98 fL 91    MCH 26 8 - 34 3 pg 30 7    MCHC 31 4 - 37 4 g/dL 33 6    RDW 11 6 - 15 1 % 13 6    Platelets 107 - 933 Thousands/uL 163      Sodium 136 - 145 mmol/L 143    Potassium 3 5 - 5 3 mmol/L 3 2Low     Chloride 100 - 108 mmol/L 110High     CO2 21 - 32 mmol/L 22    ANION GAP 4 - 13 mmol/L 11    BUN 5 - 25 mg/dL 12    Creatinine 0 60 - 1 30 mg/dL 1 04    Comment: Standardized to IDMS reference method   Glucose 65 - 140 mg/dL 81          Imaging and other studies:  No new radiographic studies

## 2019-12-05 NOTE — DISCHARGE INSTRUCTIONS
Chronic Hypertension   WHAT YOU NEED TO KNOW:   Hypertension is high blood pressure (BP)  Your BP is the force of your blood moving against the walls of your arteries  Normal BP is less than 120/80  Prehypertension is between 120/80 and 139/89  Hypertension is 140/90 or higher  Hypertension causes your BP to get so high that your heart has to work much harder than normal  This can damage your heart  Chronic hypertension is a long-term condition that you can control with a healthy lifestyle or medicines  A controlled blood pressure helps protect your organs, such as your heart, lungs, brain, and kidneys  DISCHARGE INSTRUCTIONS:   Call 911 for any of the following:   · You have discomfort in your chest that feels like squeezing, pressure, fullness, or pain  · You become confused or have difficulty speaking  · You suddenly feel lightheaded or have trouble breathing  · You have pain or discomfort in your back, neck, jaw, stomach, or arm  Return to the emergency department if:   · You have a severe headache or vision loss  · You have weakness in an arm or leg  Contact your healthcare provider if:   · You feel faint, dizzy, confused, or drowsy  · You have been taking your BP medicine and your BP is still higher than your healthcare provider says it should be  · You have questions or concerns about your condition or care  Medicines: You may need any of the following:  · Medicine  may be used to help lower your BP  You may need more than one type of medicine  Take the medicine exactly as directed  · Diuretics  help decrease extra fluid that collects in your body  This will help lower your BP  You may urinate more often while you take this medicine  · Cholesterol medicine  helps lower your cholesterol level  A low cholesterol level helps prevent heart disease and makes it easier to control your blood pressure  · Take your medicine as directed    Contact your healthcare provider if you think your medicine is not helping or if you have side effects  Tell him or her if you are allergic to any medicine  Keep a list of the medicines, vitamins, and herbs you take  Include the amounts, and when and why you take them  Bring the list or the pill bottles to follow-up visits  Carry your medicine list with you in case of an emergency  Follow up with your healthcare provider as directed: You will need to return to have your blood pressure checked and to have other lab tests done  Write down your questions so you remember to ask them during your visits  Manage chronic hypertension:  Talk with your healthcare provider about these and other ways to manage hypertension:  · Take your BP at home  Sit and rest for 5 minutes before you take your BP  Extend your arm and support it on a flat surface  Your arm should be at the same level as your heart  Follow the directions that came with your BP monitor  If possible, take at least 2 BP readings each time  Take your BP at least twice a day at the same times each day, such as morning and evening  Keep a record of your BP readings and bring it to your follow-up visits  Ask your healthcare provider what your blood pressure should be  · Limit sodium (salt) as directed  Too much sodium can affect your fluid balance  Check labels to find low-sodium or no-salt-added foods  Some low-sodium foods use potassium salts for flavor  Too much potassium can also cause health problems  Your healthcare provider will tell you how much sodium and potassium are safe for you to have in a day  He or she may recommend that you limit sodium to 2,300 mg a day  · Follow the meal plan recommended by your healthcare provider  A dietitian or your provider can give you more information on low-sodium plans or the DASH (Dietary Approaches to Stop Hypertension) eating plan  The DASH plan is low in sodium, unhealthy fats, and total fat  It is high in potassium, calcium, and fiber  · Exercise to maintain a healthy weight  Exercise at least 30 minutes per day, on most days of the week  This will help decrease your blood pressure  Ask about the best exercise plan for you  · Decrease stress  This may help lower your BP  Learn ways to relax, such as deep breathing or listening to music  · Limit alcohol  Women should limit alcohol to 1 drink a day  Men should limit alcohol to 2 drinks a day  A drink of alcohol is 12 ounces of beer, 5 ounces of wine, or 1½ ounces of liquor  · Do not smoke  Nicotine and other chemicals in cigarettes and cigars can increase your BP and also cause lung damage  Ask your healthcare provider for information if you currently smoke and need help to quit  E-cigarettes or smokeless tobacco still contain nicotine  Talk to your healthcare provider before you use these products  © 2017 [address] [employer] Information is for End User's use only and may not be sold, redistributed or otherwise used for commercial purposes  All illustrations and images included in CareNotes® are the copyrighted property of [employer]  or Roman Dailey  The above information is an  only  It is not intended as medical advice for individual conditions or treatments  Talk to your doctor, nurse or pharmacist before following any medical regimen to see if it is safe and effective for you  Chronic Hypertension   WHAT YOU NEED TO KNOW:   Hypertension is high blood pressure (BP)  Your BP is the force of your blood moving against the walls of your arteries  Normal BP is less than 120/80  Prehypertension is between 120/80 and 139/89  Hypertension is 140/90 or higher  Hypertension causes your BP to get so high that your heart has to work much harder than normal  This can damage your heart  Chronic hypertension is a long-term condition that you can control with a healthy lifestyle or medicines   A controlled blood pressure helps protect your organs, such as your heart, lungs, brain, and kidneys  DISCHARGE INSTRUCTIONS:   Call 911 for any of the following:   · You have discomfort in your chest that feels like squeezing, pressure, fullness, or pain  · You become confused or have difficulty speaking  · You suddenly feel lightheaded or have trouble breathing  · You have pain or discomfort in your back, neck, jaw, stomach, or arm  Seek care immediately if:   · You have a severe headache or vision loss  · You have weakness in an arm or leg  Contact your healthcare provider if:   · You feel faint, dizzy, confused, or drowsy  · You have been taking your BP medicine and your BP is still higher than your healthcare provider says it should be  · You have questions or concerns about your condition or care  Medicines: You may need any of the following:  · Medicine  may be used to help lower your BP  You may need more than one type of medicine  Take the medicine exactly as directed  · Diuretics  help decrease extra fluid that collects in your body  This will help lower your BP  You may urinate more often while you take this medicine  · Cholesterol medicine  helps lower your cholesterol level  A low cholesterol level helps prevent heart disease and makes it easier to control your blood pressure  · Take your medicine as directed  Contact your healthcare provider if you think your medicine is not helping or if you have side effects  Tell him or her if you are allergic to any medicine  Keep a list of the medicines, vitamins, and herbs you take  Include the amounts, and when and why you take them  Bring the list or the pill bottles to follow-up visits  Carry your medicine list with you in case of an emergency  Follow up with your healthcare provider as directed: You will need to return to have your blood pressure checked and to have other lab tests done  Write down your questions so you remember to ask them during your visits     Manage chronic hypertension:  Talk with your healthcare provider about these and other ways to manage hypertension:  · Take your BP at home  Sit and rest for 5 minutes before you take your BP  Extend your arm and support it on a flat surface  Your arm should be at the same level as your heart  Follow the directions that came with your BP monitor  If possible, take at least 2 BP readings each time  Take your BP at least twice a day at the same times each day, such as morning and evening  Keep a record of your BP readings and bring it to your follow-up visits  Ask your healthcare provider what your blood pressure should be  · Limit sodium (salt) as directed  Too much sodium can affect your fluid balance  Check labels to find low-sodium or no-salt-added foods  Some low-sodium foods use potassium salts for flavor  Too much potassium can also cause health problems  Your healthcare provider will tell you how much sodium and potassium are safe for you to have in a day  He or she may recommend that you limit sodium to 2,300 mg a day  · Follow the meal plan recommended by your healthcare provider  A dietitian or your provider can give you more information on low-sodium plans or the DASH (Dietary Approaches to Stop Hypertension) eating plan  The DASH plan is low in sodium, unhealthy fats, and total fat  It is high in potassium, calcium, and fiber  · Exercise to maintain a healthy weight  Exercise at least 30 minutes per day, on most days of the week  This will help decrease your blood pressure  Ask about the best exercise plan for you  · Decrease stress  This may help lower your BP  Learn ways to relax, such as deep breathing or listening to music  · Limit alcohol  Women should limit alcohol to 1 drink a day  Men should limit alcohol to 2 drinks a day  A drink of alcohol is 12 ounces of beer, 5 ounces of wine, or 1½ ounces of liquor  · Do not smoke    Nicotine and other chemicals in cigarettes and cigars can increase your BP and also cause lung damage  Ask your healthcare provider for information if you currently smoke and need help to quit  E-cigarettes or smokeless tobacco still contain nicotine  Talk to your healthcare provider before you use these products  © 2017 [address] [employer] Information is for End User's use only and may not be sold, redistributed or otherwise used for commercial purposes  All illustrations and images included in CareNotes® are the copyrighted property of A [employer] M [employer]  or Roman Dailey  The above information is an  only  It is not intended as medical advice for individual conditions or treatments  Talk to your doctor, nurse or pharmacist before following any medical regimen to see if it is safe and effective for you  Community Acquired Pneumonia   WHAT YOU NEED TO KNOW:   Community-acquired pneumonia (CAP) is a lung infection that you get outside of a hospital or nursing home setting  Your lungs become inflamed and cannot work well  CAP may be caused by bacteria, viruses, or fungi  DISCHARGE INSTRUCTIONS:   Seek care immediately if:   · You are confused and cannot think clearly  · You have increased trouble breathing  · Your lips or fingernails turn gray or blue  Contact your healthcare provider if:   · Your symptoms do not get better, or they get worse  · You are urinating less, or not at all  · You have questions or concerns about your condition or care  Medicines:   · Medicines  may be given to treat a bacterial, viral, or fungal infection  You may also be given medicines to dilate your bronchial tubes to help you breathe more easily  · Take your medicine as directed  Contact your healthcare provider if you think your medicine is not helping or if you have side effects  Tell him or her if you are allergic to any medicine  Keep a list of the medicines, vitamins, and herbs you take   Include the amounts, and when and why you take them  Bring the list or the pill bottles to follow-up visits  Carry your medicine list with you in case of an emergency  Follow up with your healthcare provider within 3 days or as directed: You may need another x-ray  Write down your questions so you remember to ask them during your visits  Deep breathing and coughing:  Deep breathing helps open the air passages in your lungs  Coughing helps bring up mucus from your lungs  Take a deep breath and hold the breath as long as you can  Then push the air out of your lungs with a deep, strong cough  Spit out any mucus you have coughed up  Take 10 deep breaths in a row every hour that you are awake  Remember to follow each deep breath with a cough  Do not smoke or allow others to smoke around you:  Nicotine and other chemicals in cigarettes and cigars can cause lung damage  Ask your healthcare provider for information if you currently smoke and need help to quit  E-cigarettes or smokeless tobacco still contain nicotine  Talk to your healthcare provider before you use these products  Manage CAP at home:   · Breathe warm, moist air  This helps loosen mucus  Loosely place a warm, wet washcloth over your nose and mouth  A room humidifier may also help make the air moist     · Drink liquids as directed  Ask your healthcare provider how much liquid to drink each day and which liquids to drink  Liquids help make mucus thin and easier to get out of your body  · Gently tap your chest   This helps loosen mucus so it is easier to cough  Lie with your head lower than your chest several times a day and tap your chest      · Get plenty of rest   Rest helps your body heal   Prevent CAP:   · Wash your hands often with soap and water  Carry germ-killing hand gel with you  You can use the gel to clean your hands when soap and water are not available   Do not touch your eyes, nose, or mouth unless you have washed your hands first      · Clean surfaces often  Clean doorknobs, countertops, cell phones, and other surfaces that are touched often  · Always cover your mouth when you cough  Cough into a tissue or your shirtsleeve so you do not spread germs from your hands  · Try to avoid people who have a cold or the flu  If you are sick, stay away from others as much as possible  · Ask about vaccines  You may need a vaccine to help prevent pneumonia  Get an influenza (flu) vaccine every year as soon as it becomes available  © 2017 [address] Information is for End User's use only and may not be sold, redistributed or otherwise used for commercial purposes  All illustrations and images included in CareNotes® are the copyrighted property of [employer] M [employer]  or Roman Dailey  The above information is an  only  It is not intended as medical advice for individual conditions or treatments  Talk to your doctor, nurse or pharmacist before following any medical regimen to see if it is safe and effective for you  Hypertension   WHAT YOU NEED TO KNOW:   Hypertension is high blood pressure (BP)  Your BP is the force of your blood moving against the walls of your arteries  Normal BP is less than 120/80  Prehypertension is between 120/80 and 139/89  Hypertension is 140/90 or higher  Hypertension causes your BP to get so high that your heart has to work much harder than normal  This can damage your heart  You can control hypertension with a healthy lifestyle or medicines  A controlled blood pressure helps protect your organs, such as your heart, lungs, brain, and kidneys  DISCHARGE INSTRUCTIONS:   Call 911 for any of the following:   · You have discomfort in your chest that feels like squeezing, pressure, fullness, or pain  · You become confused or have difficulty speaking  · You suddenly feel lightheaded or have trouble breathing      · You have pain or discomfort in your back, neck, jaw, stomach, or arm   Seek care immediately if:   · You have a severe headache or vision loss  · You have weakness in an arm or leg  Contact your healthcare provider if:   · You feel faint, dizzy, confused, or drowsy  · You have been taking your BP medicine and your BP is still higher than your healthcare provider says it should be  · You have questions or concerns about your condition or care  Medicines: You may  need any of the following:  · Medicine  may be used to help lower your BP  You may need more than one type of medicine  Take the medicine exactly as directed  · Diuretics  help decrease extra fluid that collects in your body  This will help lower your BP  You may urinate more often while you take this medicine  · Cholesterol medicine  helps lower your cholesterol level  A low cholesterol level helps prevent heart disease and makes it easier to control your blood pressure  · Take your medicine as directed  Contact your healthcare provider if you think your medicine is not helping or if you have side effects  Tell him or her if you are allergic to any medicine  Keep a list of the medicines, vitamins, and herbs you take  Include the amounts, and when and why you take them  Bring the list or the pill bottles to follow-up visits  Carry your medicine list with you in case of an emergency  Follow up with your healthcare provider as directed: You will need to return to have your BP checked and to have other lab tests done  Write down your questions so you remember to ask them during your visits  Manage hypertension:  Talk with your healthcare provider about these and other ways to manage hypertension:  · Check your BP at home  Sit and rest for 5 minutes before you take your BP  Extend your arm and support it on a flat surface  Your arm should be at the same level as your heart  Follow the directions that came with your BP monitor  If possible, take at least 2 BP readings each time   Take your BP at least twice a day at the same times each day, such as morning and evening  Keep a record of your BP readings and bring it to your follow-up visits  Ask your healthcare provider what your BP should be  · Limit sodium (salt) as directed  Too much sodium can affect your fluid balance  Check labels to find low-sodium or no-salt-added foods  Some low-sodium foods use potassium salts for flavor  Too much potassium can also cause health problems  Your healthcare provider will tell you how much sodium and potassium are safe for you to have in a day  He or she may recommend that you limit sodium to 2,300 mg a day  · Follow the meal plan recommended by your healthcare provider  A dietitian or your provider can give you more information on low-sodium plans or the DASH (Dietary Approaches to Stop Hypertension) eating plan  The DASH plan is low in sodium, unhealthy fats, and total fat  It is high in potassium, calcium, and fiber  · Exercise to maintain a healthy weight  Exercise at least 30 minutes per day, on most days of the week  This will help decrease your blood pressure  Ask your healthcare provider about the best exercise plan for you  · Decrease stress  This may help lower your BP  Learn ways to relax, such as deep breathing or listening to music  · Limit alcohol  Women should limit alcohol to 1 drink a day  Men should limit alcohol to 2 drinks a day  A drink of alcohol is 12 ounces of beer, 5 ounces of wine, or 1½ ounces of liquor  · Do not smoke  Nicotine and other chemicals in cigarettes and cigars can increase your BP and also cause lung damage  Ask your healthcare provider for information if you currently smoke and need help to quit  E-cigarettes or smokeless tobacco still contain nicotine  Talk to your healthcare provider before you use these products  · Manage any other health conditions you have    Health conditions such as diabetes can increase your risk for hypertension  Follow your healthcare provider's instructions and take all your medicines as directed  © 2017 [address] Information is for End User's use only and may not be sold, redistributed or otherwise used for commercial purposes  All illustrations and images included in CareNotes® are the copyrighted property of [employer]  or Roman Dailey  The above information is an  only  It is not intended as medical advice for individual conditions or treatments  Talk to your doctor, nurse or pharmacist before following any medical regimen to see if it is safe and effective for you  Hypokalemia   WHAT YOU NEED TO KNOW:   What is hypokalemia? Hypokalemia is a low level of potassium in your blood  Potassium helps control how your muscles, heart, and digestive system work  Hypokalemia occurs when your body loses too much potassium or does not absorb enough from food  What causes hypokalemia? · Diarrhea or vomiting    · Medicines, such as diuretics, blood pressure medicines, or antibiotics    · Excessive use of laxatives    · Anorexia or bulimia nervosa    · Medical conditions, such as Cushing syndrome or kidney disease    · Not eating enough foods that contain potassium  What are the signs and symptoms of hypokalemia? You may not have any signs or symptoms if you have mild hypokalemia  You may have any of the following if it is more severe:  · Fatigue    · Constipation    · Frequent urination or urinating large amounts    · Muscle cramps or skin tingling    · Muscle weakness    · Fast or irregular heartbeat  How is hypokalemia diagnosed? · An EKG  test records your heart rhythm and how fast your heart beats  It is used to check for an irregular heartbeat  · Blood tests  are done to check your potassium level  How is hypokalemia treated? You will receive potassium to bring your levels back to normal  This may be given as a pill or IV   The amount of potassium you will be given depends on your potassium level  What foods are high in potassium? Foods that are high in potassium include bananas, oranges, tomatoes, potatoes, and avocado  [place] beans, turkey, salmon, lean beef, yogurt, and milk are also high in potassium  Ask your healthcare provider or dietitian for more information about foods that are high in potassium  When should I seek immediate care? · You cannot move your arm or leg  · You have a fast or irregular heartbeat  · You are too tired or weak to stand up  When should I contact my healthcare provider? · You are vomiting, or you have diarrhea  · You have numbness or tingling in your arms or legs  · Your symptoms do not go away or they get worse  · You have questions or concerns about your condition or care  CARE AGREEMENT:   You have the right to help plan your care  Learn about your health condition and how it may be treated  Discuss treatment options with your caregivers to decide what care you want to receive  You always have the right to refuse treatment  The above information is an  only  It is not intended as medical advice for individual conditions or treatments  Talk to your doctor, nurse or pharmacist before following any medical regimen to see if it is safe and effective for you  © 2017 [address] Information is for End User's use only and may not be sold, redistributed or otherwise used for commercial purposes  All illustrations and images included in CareNotes® are the copyrighted property of [employer]  or Roman Dailey  Pneumonia   WHAT YOU NEED TO KNOW:   Pneumonia is an infection in your lungs caused by bacteria, viruses, fungi, or parasites  You can become infected if you come in contact with someone who is sick  You can get pneumonia if you recently had surgery or needed a ventilator to help you breathe   Pneumonia can also be caused by accidentally inhaling saliva or small pieces of food  Pneumonia may cause mild symptoms, or it can be severe and life-threatening  DISCHARGE INSTRUCTIONS:   Seek care immediately if:   · You cough up blood  · Your heart beats more than 100 beats in 1 minute  · You are very tired, confused, and cannot think clearly  · You have chest pain or trouble breathing  · Your lips or fingernails turn gray or blue  Contact your healthcare provider if:   · Your symptoms are the same or get worse 48 hours after you start antibiotics  · Your fever is not below 99°F (37 2°C) 48 hours after you start antibiotics  · You have a fever higher than 101°F (38 3°C)  · You cannot eat, or you have loss of appetite, nausea, or are vomiting  · You have questions or concerns about your condition or care  Medicines:   · Antibiotics  treat pneumonia caused by bacteria  · Acetaminophen  decreases pain and fever  It is available without a doctor's order  Ask how much to take and how often to take it  Follow directions  Read the labels of all other medicines you are using to see if they also contain acetaminophen, or ask your doctor or pharmacist  Acetaminophen can cause liver damage if not taken correctly  Do not use more than 4 grams (4,000 milligrams) total of acetaminophen in one day  · NSAIDs , such as ibuprofen, help decrease swelling, pain, and fever  This medicine is available with or without a doctor's order  NSAIDs can cause stomach bleeding or kidney problems in certain people  If you take blood thinner medicine, always ask your healthcare provider if NSAIDs are safe for you  Always read the medicine label and follow directions  · Take your medicine as directed  Contact your healthcare provider if you think your medicine is not helping or if you have side effects  Tell him or her if you are allergic to any medicine  Keep a list of the medicines, vitamins, and herbs you take  Include the amounts, and when and why you take them  Bring the list or the pill bottles to follow-up visits  Carry your medicine list with you in case of an emergency  Follow up with your healthcare provider as directed: You will need to return for more tests  Write down your questions so you remember to ask them during your visits  Manage your symptoms:   · Rest as needed  Rest often throughout the day  Alternate times of activity with times of rest     · Drink liquids as directed  Ask how much liquid to drink each day and which liquids are best for you  Liquids help thin your mucus, which may make it easier for you to cough it up  · Do not smoke  Avoid secondhand smoke  Smoking increases your risk for pneumonia  Smoking also makes it harder for you to get better after you have had pneumonia  Ask your healthcare provider for information if you need help to quit smoking  · Use a cool mist humidifier  A humidifier will help increase air moisture in your home  This may make it easier for you to breathe and help decrease your cough  · Keep your head elevated  You may be able to breathe better if you lie down with the head of your bed up  Prevent pneumonia:   · Prevent the spread of germs  Wash your hands often with soap and water  Use gel hand cleanser when there is no soap and water available  Do not touch your eyes, nose, or mouth unless you have washed your hands first  Cover your mouth when you cough  Cough into a tissue or your shirtsleeve so you do not spread germs from your hands  If you are sick, stay away from others as much as possible  · Limit alcohol  Women should limit alcohol to 1 drink a day  Men should limit alcohol to 2 drinks a day  A drink of alcohol is 12 ounces of beer, 5 ounces of wine, or 1½ ounces of liquor  · Ask about vaccines  You may need a vaccine to help prevent pneumonia  Get an influenza (flu) vaccine every year as soon as it becomes available    © 2017 [employer] Information is for End User's use only and may not be sold, redistributed or otherwise used for commercial purposes  All illustrations and images included in CareNotes® are the copyrighted property of A D A M , Inc  or Roman Dailey  The above information is an  only  It is not intended as medical advice for individual conditions or treatments  Talk to your doctor, nurse or pharmacist before following any medical regimen to see if it is safe and effective for you

## 2019-12-06 ENCOUNTER — TRANSITIONAL CARE MANAGEMENT (OUTPATIENT)
Dept: FAMILY MEDICINE CLINIC | Facility: CLINIC | Age: 54
End: 2019-12-06

## 2019-12-06 ENCOUNTER — PATIENT OUTREACH (OUTPATIENT)
Dept: FAMILY MEDICINE CLINIC | Facility: CLINIC | Age: 54
End: 2019-12-06

## 2019-12-06 NOTE — PROGRESS NOTES
Patient was referred by inpatient   He has history of recurrent pneumonia  Voice message left with [facility] care manager's contact information and request for return call

## 2019-12-06 NOTE — UTILIZATION REVIEW
Notification of Discharge  This is a Notification of Discharge from our facility [address]  Please be advised that this patient has been discharge from our facility  Below you will find the admission and discharge date and time including the patients disposition  PRESENTATION DATE: 12/2/2019  3:37 PM  OBS ADMISSION DATE:   IP ADMISSION DATE: 12/2/19 1942   DISCHARGE DATE: 12/5/2019  5:15 PM  DISPOSITION: Home/Self Care Home/Self Care   Admission Orders listed below:  Admission Orders (From admission, onward)     Ordered        12/02/19 1942  Inpatient Admission  Once                   Please contact the UR Department if additional information is required to close this patient's authorization/case  [facility] Utilization Review Department  Main: [phone] x carefully listen to the prompts  All voicemails are confidential   [email or web address]  Send all requests for admission clinical reviews, approved or denied determinations and any other requests to dedicated fax number below belonging to the campus where the patient is receiving treatment   List of dedicated fax numbers:  [address] DENIALS (Administrative/Medical Necessity) [phone]   [address] (Maternity/NICU/Pediatrics) [phone]   [facility] [phone]   [facility] [phone]   [facility] [phone]   [facility] [phone]   [address] [place] [address] [phone]   Glenn Colten [phone]   Sukumar Butcher [address] [id number] [facility] [address] [facility] [phone]

## 2019-12-07 LAB
BACTERIA BLD CULT: NORMAL
BACTERIA BLD CULT: NORMAL

## 2019-12-11 ENCOUNTER — OFFICE VISIT (OUTPATIENT)
Dept: PULMONOLOGY | Facility: CLINIC | Age: 54
End: 2019-12-11
Payer: COMMERCIAL

## 2019-12-11 VITALS
OXYGEN SATURATION: 95 % | HEART RATE: 75 BPM | WEIGHT: 196 LBS | DIASTOLIC BLOOD PRESSURE: 78 MMHG | HEIGHT: 67 IN | TEMPERATURE: 98 F | RESPIRATION RATE: 14 BRPM | BODY MASS INDEX: 30.76 KG/M2 | SYSTOLIC BLOOD PRESSURE: 124 MMHG

## 2019-12-11 DIAGNOSIS — J98.4 RESTRICTIVE LUNG DISEASE: ICD-10-CM

## 2019-12-11 DIAGNOSIS — R05.8 POST-VIRAL COUGH SYNDROME: Primary | ICD-10-CM

## 2019-12-11 DIAGNOSIS — J18.9 PNEUMONIA OF BOTH LOWER LOBES DUE TO INFECTIOUS ORGANISM: ICD-10-CM

## 2019-12-11 PROCEDURE — 99214 OFFICE O/P EST MOD 30 MIN: CPT | Performed by: INTERNAL MEDICINE

## 2019-12-11 RX ORDER — GABAPENTIN 100 MG/1
300 CAPSULE ORAL
Qty: 1 CAPSULE | Refills: 1 | Status: SHIPPED | OUTPATIENT
Start: 2019-12-11 | End: 2020-04-10

## 2019-12-11 RX ORDER — BENZONATATE 100 MG/1
100 CAPSULE ORAL 3 TIMES DAILY PRN
Qty: 20 CAPSULE | Refills: 0 | Status: SHIPPED | OUTPATIENT
Start: 2019-12-11 | End: 2020-01-20 | Stop reason: ALTCHOICE

## 2019-12-11 NOTE — PATIENT INSTRUCTIONS
Ok to stop Advair  Check CXR  Start Claritin  Start Gabapentin  Use Tessalon perles as needed  Use Albuterol as needed if you find it helpful
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
## 2019-12-12 ENCOUNTER — OFFICE VISIT (OUTPATIENT)
Dept: FAMILY MEDICINE CLINIC | Facility: CLINIC | Age: 54
End: 2019-12-12
Payer: COMMERCIAL

## 2019-12-12 VITALS
BODY MASS INDEX: 31.08 KG/M2 | HEIGHT: 67 IN | OXYGEN SATURATION: 95 % | WEIGHT: 198 LBS | RESPIRATION RATE: 16 BRPM | HEART RATE: 80 BPM | SYSTOLIC BLOOD PRESSURE: 126 MMHG | TEMPERATURE: 99.3 F | DIASTOLIC BLOOD PRESSURE: 88 MMHG

## 2019-12-12 DIAGNOSIS — J18.9 RECURRENT PNEUMONIA: ICD-10-CM

## 2019-12-12 DIAGNOSIS — R05.8 POST-VIRAL COUGH SYNDROME: Primary | ICD-10-CM

## 2019-12-12 DIAGNOSIS — E87.6 HYPOKALEMIA: ICD-10-CM

## 2019-12-12 PROCEDURE — 99495 TRANSJ CARE MGMT MOD F2F 14D: CPT | Performed by: INTERNAL MEDICINE

## 2019-12-12 PROCEDURE — 1111F DSCHRG MED/CURRENT MED MERGE: CPT | Performed by: INTERNAL MEDICINE

## 2019-12-12 NOTE — PROGRESS NOTES
Assessment/Plan:   TCM Call (since 11/15/2019)     Date and time call was made  12/6/2019 10:16 AM    Patient was hospitialized at  [facility]    Date of Admission  12/02/19    Date of discharge  12/05/19    Diagnosis  pneumonia    Disposition  Home      TCM Call (since 11/15/2019)     Scheduled for follow up? Yes    Did you obtain your prescribed medications  Yes    Do you need help managing your prescriptions or medications  No    Is transportation to your appointment needed  No    I have advised the patient to call PCP with any new or worsening symptoms  MR Aden    Are you recieving any outpatient services  No    Are you recieving home care services  No    Are you using any community resources  No    Current waiver services  No    Have you fallen in the last 12 months  No    Interperter language line needed  No    Counseling  Patient    Counseling topics  instructions for management; Importance of RX compliance                  Subjective:     Patient ID: Monty Tinsley is a 47 y o  male  Pt was admitted 12/2 and d/c'd 12/5 2nd recurrent pneumonia  He was neg for the flu, legionella, strep  His cta was negative and aspiration was r/o  His ct showed basil airspace abnormalities ?pneumonia vs aspiration  He is feeling improved  He had f/u with pulmonary  [place] to have post viral cough syndrome  He had common variable syndrome r/o  Pt to have repeat cxr this weekend  Review of Systems   Constitutional: Negative  Negative for chills and fever  HENT: Negative  Respiratory: Positive for cough  Objective:     Physical Exam   Constitutional: He appears well-developed and well-nourished  No distress  Decreasing cough per pt  Still some present   HENT:   Head: Normocephalic and atraumatic  Right Ear: External ear normal    Left Ear: External ear normal    Nose: Nose normal    Mouth/Throat: Oropharynx is clear and moist  No oropharyngeal exudate     Neck: Normal range of motion  Neck supple  No thyromegaly present  Cardiovascular: Normal rate, regular rhythm and normal heart sounds  Exam reveals no gallop and no friction rub  No murmur heard  Pulmonary/Chest: Effort normal and breath sounds normal  No stridor  No respiratory distress  He has no wheezes  He has no rales  He exhibits no tenderness  Lymphadenopathy:     He has no cervical adenopathy  Skin: He is not diaphoretic  Vitals reviewed  Vitals:    12/12/19 1546   BP: 126/88   BP Location: Left arm   Patient Position: Sitting   Cuff Size: Large   Pulse: 80   Resp: 16   Temp: 99 3 °F (37 4 °C)   TempSrc: Temporal   SpO2: 95%   Weight: 89 8 kg (198 lb)   Height: 5' 7" (1 702 m)       Transitional Care Management Review:  Tammy Weathers is a 47 y o  male here for TCM follow up  During the TCM phone call patient stated:    TCM Call (since 11/15/2019)     Date and time call was made  12/6/2019 10:16 AM    Patient was hospitialized at  [facility]    Date of Admission  12/02/19    Date of discharge  12/05/19    Diagnosis  pneumonia    Disposition  Home      TCM Call (since 11/15/2019)     Scheduled for follow up? Yes    Did you obtain your prescribed medications  Yes    Do you need help managing your prescriptions or medications  No    Is transportation to your appointment needed  No    I have advised the patient to call PCP with any new or worsening symptoms  MR Sina    Are you recieving any outpatient services  No    Are you recieving home care services  No    Are you using any community resources  No    Current waiver services  No    Have you fallen in the last 12 months  No    Interperter language line needed  No    Counseling  Patient    Counseling topics  instructions for management;  Importance of RX compliance          Lili Foley DO

## 2019-12-16 ENCOUNTER — PATIENT OUTREACH (OUTPATIENT)
Dept: FAMILY MEDICINE CLINIC | Facility: CLINIC | Age: 54
End: 2019-12-16

## 2019-12-16 NOTE — PROGRESS NOTES
2nd outreach to offer outpatient care management  Voice message left with [facility] care manager's contact information and request for return call

## 2019-12-27 ENCOUNTER — PATIENT OUTREACH (OUTPATIENT)
Dept: FAMILY MEDICINE CLINIC | Facility: CLINIC | Age: 54
End: 2019-12-27

## 2019-12-27 NOTE — PROGRESS NOTES
Inpatient referral   Patient did keep appointment with PCP and pulmonologist       Voice message left with contact information for outpatient care manager and request for return call

## 2020-01-15 ENCOUNTER — PATIENT OUTREACH (OUTPATIENT)
Dept: INTERNAL MEDICINE CLINIC | Facility: CLINIC | Age: 55
End: 2020-01-15

## 2020-01-15 NOTE — PROGRESS NOTES
Call to offer outpatient care management  Patient stated that his post pneumonia cough is almost gone  He has not used inhaler in over 1 month  Medication prescribed by pulmonologist will end just prior to follow up pulmonology appointment  Patient stated that he does not check his blood pressure at home routinely, but he does have BP cuff and does check BP if he has headache or feels symptomatic  Lately he has not had frequent headaches and when he has checked BP it has been within normal range (SBP less that 140/ DBP less than 85)  Patient is aware of upcoming medical appointments  Patient has history of low back pain  He was in the hospital when follow up with spine and pain was scheduled  He has follow up for low back pain scheduled next week  Initial appointment relieved low back pain almost 100%    Patient accepted outpatient care managers contact information but declined outpatient care management  Patient has adequate resources to meet health care needs and has good understanding of his chronic disease processes  He is compliant with medications and did not identify any needs at this time

## 2020-01-20 ENCOUNTER — HOSPITAL ENCOUNTER (OUTPATIENT)
Dept: RADIOLOGY | Facility: MEDICAL CENTER | Age: 55
Discharge: HOME | End: 2020-01-20
Attending: PHYSICAL MEDICINE & REHABILITATION | Admitting: PHYSICAL MEDICINE & REHABILITATION
Payer: COMMERCIAL

## 2020-01-20 VITALS
SYSTOLIC BLOOD PRESSURE: 154 MMHG | DIASTOLIC BLOOD PRESSURE: 98 MMHG | HEART RATE: 58 BPM | RESPIRATION RATE: 20 BRPM | OXYGEN SATURATION: 94 % | TEMPERATURE: 97.6 F

## 2020-01-20 DIAGNOSIS — M54.50 LUMBALGIA: ICD-10-CM

## 2020-01-20 DIAGNOSIS — M43.17 SPONDYLOLISTHESIS OF LUMBOSACRAL REGION: ICD-10-CM

## 2020-01-20 PROCEDURE — 64494 INJ PARAVERT F JNT L/S 2 LEV: CPT | Performed by: PHYSICAL MEDICINE & REHABILITATION

## 2020-01-20 PROCEDURE — 64493 INJ PARAVERT F JNT L/S 1 LEV: CPT | Performed by: PHYSICAL MEDICINE & REHABILITATION

## 2020-01-20 RX ORDER — BUPIVACAINE HYDROCHLORIDE 5 MG/ML
10 INJECTION, SOLUTION EPIDURAL; INTRACAUDAL ONCE
Status: COMPLETED | OUTPATIENT
Start: 2020-01-20 | End: 2020-01-20

## 2020-01-20 RX ADMIN — BUPIVACAINE HYDROCHLORIDE 3 ML: 5 INJECTION, SOLUTION EPIDURAL; INTRACAUDAL at 11:33

## 2020-01-20 NOTE — DISCHARGE INSTRUCTIONS
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
## 2020-01-21 ENCOUNTER — OFFICE VISIT (OUTPATIENT)
Dept: PULMONOLOGY | Facility: CLINIC | Age: 55
End: 2020-01-21
Payer: COMMERCIAL

## 2020-01-21 VITALS
TEMPERATURE: 97.4 F | WEIGHT: 204 LBS | HEIGHT: 67 IN | HEART RATE: 59 BPM | SYSTOLIC BLOOD PRESSURE: 140 MMHG | OXYGEN SATURATION: 96 % | BODY MASS INDEX: 32.02 KG/M2 | DIASTOLIC BLOOD PRESSURE: 100 MMHG

## 2020-01-21 DIAGNOSIS — J98.4 RESTRICTIVE LUNG DISEASE: ICD-10-CM

## 2020-01-21 DIAGNOSIS — R93.89 ABNORMAL CT OF THE CHEST: ICD-10-CM

## 2020-01-21 DIAGNOSIS — R05.8 POST-VIRAL COUGH SYNDROME: Primary | ICD-10-CM

## 2020-01-21 PROBLEM — A41.9 SEPSIS (HCC): Status: RESOLVED | Noted: 2019-12-02 | Resolved: 2020-01-21

## 2020-01-21 PROBLEM — J18.9 RECURRENT PNEUMONIA: Status: RESOLVED | Noted: 2019-12-02 | Resolved: 2020-01-21

## 2020-01-21 PROCEDURE — 99214 OFFICE O/P EST MOD 30 MIN: CPT | Performed by: INTERNAL MEDICINE
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
## 2020-01-21 NOTE — ASSESSMENT & PLAN NOTE
Significantly improved  - Continue to monitor off of Tessalon Perles and Albuterol inhaler   - Cont  Gabapentin until cough has resolved, then stop    - Change Loratadine use to "as needed"

## 2020-01-21 NOTE — ASSESSMENT & PLAN NOTE
- Need full lung volumes for diagnosis  - Consider lung volume testing in future  Although, patient has normal CT chest, no neuromuscular disease, no heart failure; therefore, I have a low suspicion for restrictive disease

## 2020-01-24 ENCOUNTER — TELEPHONE (OUTPATIENT)
Dept: RADIOLOGY | Facility: MEDICAL CENTER | Age: 55
End: 2020-01-24

## 2020-01-24 NOTE — TELEPHONE ENCOUNTER
Pain diary reviewed by Dr Cecil Nava; proceed with RFA and f/u; I will call and coordinate       Bilateral L3-5

## 2020-01-28 NOTE — TELEPHONE ENCOUNTER
Patient called and scheduled:     Left L3-5 RFA on 3/11  Right L3-5 RFA on 3/23  6w f/u with Heidy Gallegos on 4/25    Reviewed instructions: , NPO 1 hour prior, loose-fitting/comfortable clothing, if ill/fever/infx/abx to call and reschedule  No need to hold any meds prior  Patient stated verbal understanding

## 2020-03-11 ENCOUNTER — HOSPITAL ENCOUNTER (OUTPATIENT)
Dept: RADIOLOGY | Facility: MEDICAL CENTER | Age: 55
Discharge: HOME | End: 2020-03-11
Attending: PHYSICAL MEDICINE & REHABILITATION | Admitting: PHYSICAL MEDICINE & REHABILITATION
Payer: COMMERCIAL

## 2020-03-11 ENCOUNTER — TELEPHONE (OUTPATIENT)
Dept: RADIOLOGY | Facility: MEDICAL CENTER | Age: 55
End: 2020-03-11

## 2020-03-11 VITALS
RESPIRATION RATE: 18 BRPM | HEART RATE: 56 BPM | SYSTOLIC BLOOD PRESSURE: 145 MMHG | TEMPERATURE: 98.6 F | DIASTOLIC BLOOD PRESSURE: 97 MMHG | OXYGEN SATURATION: 96 %

## 2020-03-11 DIAGNOSIS — M47.816 LUMBAR SPONDYLOSIS: ICD-10-CM

## 2020-03-11 PROCEDURE — 64635 DESTROY LUMB/SAC FACET JNT: CPT | Performed by: PHYSICAL MEDICINE & REHABILITATION

## 2020-03-11 PROCEDURE — 64636 DESTROY L/S FACET JNT ADDL: CPT | Performed by: PHYSICAL MEDICINE & REHABILITATION

## 2020-03-11 RX ORDER — LIDOCAINE HYDROCHLORIDE 10 MG/ML
5 INJECTION, SOLUTION EPIDURAL; INFILTRATION; INTRACAUDAL; PERINEURAL ONCE
Status: COMPLETED | OUTPATIENT
Start: 2020-03-11 | End: 2020-03-11

## 2020-03-11 RX ORDER — BUPIVACAINE HCL/PF 2.5 MG/ML
10 VIAL (ML) INJECTION ONCE
Status: COMPLETED | OUTPATIENT
Start: 2020-03-11 | End: 2020-03-11

## 2020-03-11 RX ADMIN — Medication 5 ML: at 13:42

## 2020-03-11 RX ADMIN — Medication 4 ML: at 13:36

## 2020-03-11 RX ADMIN — LIDOCAINE HYDROCHLORIDE 1.5 ML: 10 INJECTION, SOLUTION EPIDURAL; INFILTRATION; INTRACAUDAL; PERINEURAL at 13:30

## 2020-03-11 NOTE — DISCHARGE INSTRUCTIONS

## 2020-03-16 NOTE — TELEPHONE ENCOUNTER
S/W pt  Pt stated needle sites look good, denies S&S of infection, denies fevers, and denies sun burn like sensation  Pt stated the "pain is greatly reduced  It still hurts in the middle of the night "   Advised pt if he does get pain to take his prescribed or OTC pain medications and/or use ice/heat and that it takes 4 to 6 weeks to see the full effect  Confirmed next appt w/ pt  Pt verbalized understanding

## 2020-04-10 ENCOUNTER — TELEMEDICINE (OUTPATIENT)
Dept: FAMILY MEDICINE CLINIC | Facility: CLINIC | Age: 55
End: 2020-04-10
Payer: COMMERCIAL

## 2020-04-10 DIAGNOSIS — M54.50 CHRONIC BILATERAL LOW BACK PAIN WITHOUT SCIATICA: ICD-10-CM

## 2020-04-10 DIAGNOSIS — I10 BENIGN ESSENTIAL HTN: Primary | ICD-10-CM

## 2020-04-10 DIAGNOSIS — E87.6 HYPOKALEMIA: ICD-10-CM

## 2020-04-10 DIAGNOSIS — G89.29 CHRONIC BILATERAL LOW BACK PAIN WITHOUT SCIATICA: ICD-10-CM

## 2020-04-10 DIAGNOSIS — R05.8 POST-VIRAL COUGH SYNDROME: ICD-10-CM

## 2020-04-10 PROCEDURE — 99442 PR PHYS/QHP TELEPHONE EVALUATION 11-20 MIN: CPT | Performed by: INTERNAL MEDICINE

## 2020-05-26 NOTE — TELEPHONE ENCOUNTER
Patient called & left message on S/P voicemail requesting to schedule his ablation procedure   Please advise,camilo    Call back# [phone]

## 2020-06-17 NOTE — TELEPHONE ENCOUNTER
Patient scheduled 7/10  reviewed all pre-procedural instructions including updated protocol/screening  Pt to wear a mask  Pt will need a  but they must wait in the car  Reviewed pre-procedural instructions: NPO 1 hr prior, wear pants w/o metal, call back to r/s if become ill/abx  COVID screening done  Pt verbalized understanding

## 2020-06-18 DIAGNOSIS — I10 HYPERTENSION, UNSPECIFIED TYPE: ICD-10-CM

## 2020-06-19 ENCOUNTER — EVALUATION (OUTPATIENT)
Dept: PHYSICAL THERAPY | Facility: CLINIC | Age: 55
End: 2020-06-19
Payer: COMMERCIAL

## 2020-06-19 DIAGNOSIS — M54.50 CHRONIC MIDLINE LOW BACK PAIN WITHOUT SCIATICA: Primary | ICD-10-CM

## 2020-06-19 DIAGNOSIS — G89.29 CHRONIC MIDLINE LOW BACK PAIN WITHOUT SCIATICA: Primary | ICD-10-CM

## 2020-06-19 PROCEDURE — 97163 PT EVAL HIGH COMPLEX 45 MIN: CPT | Performed by: PHYSICAL THERAPIST

## 2020-06-22 RX ORDER — AMLODIPINE BESYLATE 10 MG/1
10 TABLET ORAL DAILY
Qty: 90 TABLET | Refills: 0 | Status: SHIPPED | OUTPATIENT
Start: 2020-06-22 | End: 2021-03-24 | Stop reason: SDUPTHER

## 2020-06-22 RX ORDER — METOPROLOL SUCCINATE 50 MG/1
25 TABLET, EXTENDED RELEASE ORAL DAILY
Qty: 45 TABLET | Refills: 0 | Status: SHIPPED | OUTPATIENT
Start: 2020-06-22 | End: 2021-03-24 | Stop reason: SDUPTHER

## 2020-06-22 RX ORDER — LISINOPRIL 20 MG/1
20 TABLET ORAL DAILY
Qty: 90 TABLET | Refills: 0 | Status: SHIPPED | OUTPATIENT
Start: 2020-06-22 | End: 2021-06-09

## 2020-06-22 RX ORDER — AMLODIPINE BESYLATE 10 MG/1
10 TABLET ORAL DAILY
Qty: 90 TABLET | Refills: 0 | Status: SHIPPED | OUTPATIENT
Start: 2020-06-22 | End: 2020-11-18

## 2020-06-22 RX ORDER — LISINOPRIL 20 MG/1
20 TABLET ORAL DAILY
Qty: 90 TABLET | Refills: 0 | Status: SHIPPED | OUTPATIENT
Start: 2020-06-22 | End: 2020-11-18

## 2020-06-22 RX ORDER — METOPROLOL SUCCINATE 50 MG/1
25 TABLET, EXTENDED RELEASE ORAL DAILY
Qty: 45 TABLET | Refills: 0 | Status: SHIPPED | OUTPATIENT
Start: 2020-06-22 | End: 2020-11-18

## 2020-06-23 ENCOUNTER — OFFICE VISIT (OUTPATIENT)
Dept: PHYSICAL THERAPY | Facility: CLINIC | Age: 55
End: 2020-06-23
Payer: COMMERCIAL

## 2020-06-23 DIAGNOSIS — G89.29 CHRONIC MIDLINE LOW BACK PAIN WITHOUT SCIATICA: Primary | ICD-10-CM

## 2020-06-23 DIAGNOSIS — M54.50 CHRONIC MIDLINE LOW BACK PAIN WITHOUT SCIATICA: Primary | ICD-10-CM

## 2020-06-23 PROCEDURE — 97140 MANUAL THERAPY 1/> REGIONS: CPT | Performed by: PHYSICAL THERAPIST

## 2020-06-23 PROCEDURE — 97112 NEUROMUSCULAR REEDUCATION: CPT | Performed by: PHYSICAL THERAPIST

## 2020-06-23 PROCEDURE — 97110 THERAPEUTIC EXERCISES: CPT | Performed by: PHYSICAL THERAPIST

## 2020-06-26 ENCOUNTER — OFFICE VISIT (OUTPATIENT)
Dept: PHYSICAL THERAPY | Facility: CLINIC | Age: 55
End: 2020-06-26
Payer: COMMERCIAL

## 2020-06-26 DIAGNOSIS — M54.50 CHRONIC MIDLINE LOW BACK PAIN WITHOUT SCIATICA: Primary | ICD-10-CM

## 2020-06-26 DIAGNOSIS — G89.29 CHRONIC MIDLINE LOW BACK PAIN WITHOUT SCIATICA: Primary | ICD-10-CM

## 2020-06-26 PROCEDURE — 97140 MANUAL THERAPY 1/> REGIONS: CPT | Performed by: PHYSICAL THERAPIST

## 2020-06-26 PROCEDURE — 97110 THERAPEUTIC EXERCISES: CPT | Performed by: PHYSICAL THERAPIST

## 2020-06-26 PROCEDURE — 97112 NEUROMUSCULAR REEDUCATION: CPT | Performed by: PHYSICAL THERAPIST

## 2020-06-30 ENCOUNTER — OFFICE VISIT (OUTPATIENT)
Dept: PHYSICAL THERAPY | Facility: CLINIC | Age: 55
End: 2020-06-30
Payer: COMMERCIAL

## 2020-06-30 DIAGNOSIS — M54.50 CHRONIC MIDLINE LOW BACK PAIN WITHOUT SCIATICA: Primary | ICD-10-CM

## 2020-06-30 DIAGNOSIS — G89.29 CHRONIC MIDLINE LOW BACK PAIN WITHOUT SCIATICA: Primary | ICD-10-CM

## 2020-06-30 PROCEDURE — 97110 THERAPEUTIC EXERCISES: CPT | Performed by: PHYSICAL THERAPIST

## 2020-06-30 PROCEDURE — 97112 NEUROMUSCULAR REEDUCATION: CPT | Performed by: PHYSICAL THERAPIST

## 2020-06-30 PROCEDURE — 97140 MANUAL THERAPY 1/> REGIONS: CPT | Performed by: PHYSICAL THERAPIST

## 2020-07-02 ENCOUNTER — OFFICE VISIT (OUTPATIENT)
Dept: PHYSICAL THERAPY | Facility: CLINIC | Age: 55
End: 2020-07-02
Payer: COMMERCIAL

## 2020-07-02 DIAGNOSIS — M54.50 CHRONIC MIDLINE LOW BACK PAIN WITHOUT SCIATICA: Primary | ICD-10-CM

## 2020-07-02 DIAGNOSIS — G89.29 CHRONIC MIDLINE LOW BACK PAIN WITHOUT SCIATICA: Primary | ICD-10-CM

## 2020-07-02 PROCEDURE — 97110 THERAPEUTIC EXERCISES: CPT | Performed by: PHYSICAL THERAPIST

## 2020-07-02 PROCEDURE — 97112 NEUROMUSCULAR REEDUCATION: CPT | Performed by: PHYSICAL THERAPIST

## 2020-07-02 PROCEDURE — 97140 MANUAL THERAPY 1/> REGIONS: CPT | Performed by: PHYSICAL THERAPIST

## 2020-07-02 NOTE — PROGRESS NOTES
Daily Note     Today's date: 2020  Patient name: Delma Bradford  : 1965  MRN: [id number]  Referring provider: Prema Dsouza PT  Dx:   Encounter Diagnosis     ICD-10-CM    1  Chronic midline low back pain without sciatica M54 5     G89 29        Start Time: 1355  Stop Time: 1448  Total time in clinic (min): 53 minutes    Subjective: Pt reports feeling fine after last session  Does c/o pain today as he relates this to "sleeping wrong " Pt has starting to ride is bike on the  but was only able to tolerate 5 minutes prior to his legs fatiguing  Objective: See treatment diary below      Assessment: Hip IR is most limiting plane of motion at hips  Hamstring length still significantly limited therefore, provided standing stretching in today's session  Pt able to perform a sit to stand transfer without any pain, if properly cued  Tolerated treatment fair  Patient demonstrated fatigue post treatment  Plan: Continue per plan of care        Precautions: HTN      Manuals   7        Sacral PA  8' Sacral rocking 7' Gr III Sacral rocking 6' Gr III Sacral rocking 6' Gr III        Hip extension mobilizations  Gr II 4'  Gr III 4' B DC DC        Sciatic N glides   4' B 4' B NP        Hip IR mobilization in prone   nv Gr III 3' B Gr III 3' B        Neuro Re-Ed             Pain neuroscience education  nv 13'  15' 5' 5'        TrA in hooklying  performed           TrA March  5x on each            TrA bridge   10x with assistance 10x without assistance 10x          TrA SLS             TrA SLR    nv         Prone LE lifts with TrA  5x on each 10x on each 10x on each          Multifidus press with TB    nv         Dead bugs progression   LE eccentric march x10 on each  LE eccentric march x10 on each  With PB alt UE LE ext x 10         Active 90/90 N glides   10x          Quad UE/LE lifts   nv LE lifts only 10x on each with cueing  10x each with cueing        Xwalks with TrA   nv          Side bridge   nv 10x on each          Dead lifts with DB   nv          Ab roller with PB     10x        Ther Ex             Bicycle              Hip hinge - slow progression   10x with dowel 10x with dowel          LAT pull down with TrA    nv         Wood way   5' 5' 5'        Hamstring stretch     On step 5x10" B                                               Ther Activity             Sit to stand squats with KB     2x10 KB                     Gait Training                                       Modalities                                       1:1 with PT from [phone]

## 2020-07-07 ENCOUNTER — OFFICE VISIT (OUTPATIENT)
Dept: PHYSICAL THERAPY | Facility: CLINIC | Age: 55
End: 2020-07-07
Payer: COMMERCIAL

## 2020-07-07 DIAGNOSIS — M54.50 CHRONIC MIDLINE LOW BACK PAIN WITHOUT SCIATICA: Primary | ICD-10-CM

## 2020-07-07 DIAGNOSIS — G89.29 CHRONIC MIDLINE LOW BACK PAIN WITHOUT SCIATICA: Primary | ICD-10-CM

## 2020-07-07 PROCEDURE — 97110 THERAPEUTIC EXERCISES: CPT | Performed by: PHYSICAL THERAPIST

## 2020-07-07 PROCEDURE — 97112 NEUROMUSCULAR REEDUCATION: CPT | Performed by: PHYSICAL THERAPIST

## 2020-07-07 PROCEDURE — 97140 MANUAL THERAPY 1/> REGIONS: CPT | Performed by: PHYSICAL THERAPIST

## 2020-07-07 NOTE — PROGRESS NOTES
Daily Note     Today's date: 2020  Patient name: Ruben Prakash  : 1965  MRN: [id number]  Referring provider: Farzana Taylor PT  Dx:   Encounter Diagnosis     ICD-10-CM    1  Chronic midline low back pain without sciatica M54 5     G89 29        Start Time: 1530  Stop Time: 1615  Total time in clinic (min): 45 minutes    Subjective: Pt denies any significant changes over the weekend  He expressed interest in working on posture during floor to standing transfers as he plays with his 11 month old son while on the floor  Objective: See treatment diary below      Assessment: Worked on body mechanics for transfers and lifting  Initiated Gr V to Mid thoracic spine for hypoalgesic effect  Also follow up thoracic thrust with core stability exercises to improve contract of erector spinae  Tolerated progression of treatment  well  Cueing required with several exercises for correct performance, including transfers  Patient demonstrated fatigue post treatment  Plan: Continue per plan of care        Precautions: HTN      Manuals         Sacral PA  8' Sacral rocking 7' Gr III Sacral rocking 6' Gr III Sacral rocking 6' Gr III Sacral rocking 6' Gr III       Hip extension mobilizations  Gr II 4'  Gr III 4' B DC DC        Sciatic N glides   4' B 4' B NP        Hip IR mobilization in prone   nv Gr III 3' B Gr III 3' B Gr III 3' B       Mid thoracic mobilizations      Gr V       Neuro Re-Ed             Pain neuroscience education  nv 13'  15' 5' 5'        TrA in hooklying  performed           TrA March  5x on each            TrA bridge   10x with assistance 10x without assistance 10x          TrA SLS             TrA SLR    nv         Prone LE lifts with TrA  5x on each 10x on each 10x on each          Multifidus press with TB    nv  At [facility] 8" 3"x10 B       Dead bugs progression   LE eccentric march x10 on each  LE eccentric march x10 on each   With PB alt UE LE ext x 10        Active 90/90 N glides   10x          Quad UE/LE lifts   nv LE lifts only 10x on each with cueing  10x each with cueing 10x each with cueing       Xwalks with TrA   nv          Side bridge   nv 10x on each          Dead lifts with DB   nv          Ab roller with PB     10x        Ther Ex             Bicycle              Hip hinge - slow progression   10x with dowel 10x with dowel   using BTB 30x        LAT pull down with TrA    nv         [address] way   5' 5' 5' 5'       Hamstring stretch     On step 5x10" B On step 5x10" B       Pelvic Rocking into flexion in quadruped      10x5"                                 Ther Activity             Sit to stand squats with KB     2x10 KB        Reverse lunge with Valslide      10x on each with TrA and upright trunk       Floor to half kneeling-to standing transfers      5'       Gait Training                                       Modalities                                       1:1 with PT from [phone]

## 2020-07-09 ENCOUNTER — OFFICE VISIT (OUTPATIENT)
Dept: PHYSICAL THERAPY | Facility: CLINIC | Age: 55
End: 2020-07-09
Payer: COMMERCIAL

## 2020-07-09 DIAGNOSIS — M54.50 CHRONIC MIDLINE LOW BACK PAIN WITHOUT SCIATICA: Primary | ICD-10-CM

## 2020-07-09 DIAGNOSIS — G89.29 CHRONIC MIDLINE LOW BACK PAIN WITHOUT SCIATICA: Primary | ICD-10-CM

## 2020-07-09 PROCEDURE — 97112 NEUROMUSCULAR REEDUCATION: CPT | Performed by: PHYSICAL THERAPIST

## 2020-07-09 PROCEDURE — 97110 THERAPEUTIC EXERCISES: CPT | Performed by: PHYSICAL THERAPIST

## 2020-07-09 PROCEDURE — 97140 MANUAL THERAPY 1/> REGIONS: CPT | Performed by: PHYSICAL THERAPIST

## 2020-07-09 NOTE — PROGRESS NOTES
Daily Note     Today's date: 2020  Patient name: Jayshree Disla  : 1965  MRN: [id number]  Referring provider: Franky Ferraro PT  Dx:   Encounter Diagnosis     ICD-10-CM    1  Chronic midline low back pain without sciatica M54 5     G89 29        Start Time:   Stop Time:   Total time in clinic (min): 38 minutes    Subjective: Pt offers no new complaints today  He continues to get minimal sleep secondary to 11 month old son  Objective: See treatment diary below      Assessment: Patient's movement, transfers, and bed mobility is much more fluid  He has much less pain during these transitional movements  I continue to update his lumbopelvic stability program  Patient demonstrates excellent compliance in the clinic and at home  Tolerated treatment well  Patient would benefit from continued PT  Plan: Continue per plan of care        Precautions: HTN      Manuals        Sacral PA  8' Sacral rocking 7' Gr III Sacral rocking 6' Gr III Sacral rocking 6' Gr III Sacral rocking 6' Gr III Sacral rocking 6' Gr III      Hip extension mobilizations  Gr II 4'  Gr III 4' B DC DC        Sciatic N glides   4' B 4' B NP        Hip IR mobilization in prone   nv Gr III 3' B Gr III 3' B Gr III 3' B Gr III 3' B      Mid thoracic mobilizations      Gr V Gr V      Neuro Re-Ed             Pain neuroscience education  nv 13'  15' 5' 5'        TrA in hooklying  performed           TrA March  5x on each            TrA bridge   10x with assistance 10x without assistance 10x          TrA SLS             TrA SLR    nv         Prone LE lifts with TrA  5x on each 10x on each 10x on each          Multifidus press with TB    nv  At v[facility] 8" 3"x10 B At [employer] 8" 3"x10 B      Dead bugs progression   LE eccentric march x10 on each  LE eccentric march x10 on each   With PB alt UE LE ext x 10        Active 90/90 N glides   10x          Quad UE/LE lifts   nv LE lifts only 10x on each with cueing  10x each with cueing 10x each with cueing 2x10       Xwalks with TrA   nv    4x15 ft rtb      Side bridge   nv 10x on each          Dead lifts with DB   nv          Ab roller with PB     10x        Ther Ex             Bicycle              Hip hinge - slow progression   10x with dowel 10x with dowel   using BTB 30x        LAT pull down with TrA    nv  nv 2x10 with SLS on each LE- 20 lbs      [address]   5' 5' 5' 5' 5'      Hamstring stretch     On step 5x10" B On step 5x10" B 3x30" B on step       Pelvic Rocking into flexion in quadruped      10x5" 10x5"                                Ther Activity             Sit to stand squats with KB     2x10 KB  2x10 KB 10lbs      Reverse lunge with Valslide      10x on each with TrA and upright trunk       Floor to half kneeling-to standing transfers      5'       Gait Training                                       Modalities                                       1:1 with PT from [phone]

## 2020-07-10 ENCOUNTER — TELEPHONE (OUTPATIENT)
Dept: RADIOLOGY | Facility: MEDICAL CENTER | Age: 55
End: 2020-07-10

## 2020-07-10 ENCOUNTER — HOSPITAL ENCOUNTER (OUTPATIENT)
Dept: RADIOLOGY | Facility: MEDICAL CENTER | Age: 55
Discharge: HOME | End: 2020-07-10
Attending: PHYSICAL MEDICINE & REHABILITATION | Admitting: PHYSICAL MEDICINE & REHABILITATION
Payer: COMMERCIAL

## 2020-07-10 VITALS
HEART RATE: 51 BPM | OXYGEN SATURATION: 98 % | RESPIRATION RATE: 18 BRPM | SYSTOLIC BLOOD PRESSURE: 138 MMHG | DIASTOLIC BLOOD PRESSURE: 93 MMHG | TEMPERATURE: 99.2 F

## 2020-07-10 DIAGNOSIS — M47.816 LUMBAR SPONDYLOSIS: ICD-10-CM

## 2020-07-10 PROCEDURE — 64635 DESTROY LUMB/SAC FACET JNT: CPT | Performed by: PHYSICAL MEDICINE & REHABILITATION

## 2020-07-10 PROCEDURE — 64636 DESTROY L/S FACET JNT ADDL: CPT | Performed by: PHYSICAL MEDICINE & REHABILITATION

## 2020-07-10 RX ORDER — BUPIVACAINE HCL/PF 2.5 MG/ML
10 VIAL (ML) INJECTION ONCE
Status: COMPLETED | OUTPATIENT
Start: 2020-07-10 | End: 2020-07-10

## 2020-07-10 RX ORDER — LIDOCAINE HYDROCHLORIDE 10 MG/ML
5 INJECTION, SOLUTION EPIDURAL; INFILTRATION; INTRACAUDAL; PERINEURAL ONCE
Status: COMPLETED | OUTPATIENT
Start: 2020-07-10 | End: 2020-07-10

## 2020-07-10 RX ADMIN — Medication 4 ML: at 10:35

## 2020-07-10 RX ADMIN — Medication 5 ML: at 10:41

## 2020-07-10 RX ADMIN — LIDOCAINE HYDROCHLORIDE 2.5 ML: 10 INJECTION, SOLUTION EPIDURAL; INFILTRATION; INTRACAUDAL; PERINEURAL at 10:30

## 2020-07-10 NOTE — DISCHARGE INSTRUCTIONS
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
## 2020-07-13 NOTE — TELEPHONE ENCOUNTER
FYI-    RN s/w pt  Pt states " The pain is very minor now , almost not there which makes me happy!" Pt denies redness, drainage, swelling, sunburn like sensation and fever  Pt reminded it will take 2 weeks to notice a difference and 4-6 weeks for full effect  Pt verbalized understanding

## 2020-07-14 ENCOUNTER — OFFICE VISIT (OUTPATIENT)
Dept: PHYSICAL THERAPY | Facility: CLINIC | Age: 55
End: 2020-07-14
Payer: COMMERCIAL

## 2020-07-14 DIAGNOSIS — M54.50 CHRONIC MIDLINE LOW BACK PAIN WITHOUT SCIATICA: Primary | ICD-10-CM

## 2020-07-14 DIAGNOSIS — G89.29 CHRONIC MIDLINE LOW BACK PAIN WITHOUT SCIATICA: Primary | ICD-10-CM

## 2020-07-14 PROCEDURE — 97112 NEUROMUSCULAR REEDUCATION: CPT | Performed by: PHYSICAL THERAPIST

## 2020-07-14 PROCEDURE — 97110 THERAPEUTIC EXERCISES: CPT | Performed by: PHYSICAL THERAPIST

## 2020-07-14 PROCEDURE — 97140 MANUAL THERAPY 1/> REGIONS: CPT | Performed by: PHYSICAL THERAPIST

## 2020-07-14 NOTE — PROGRESS NOTES
Daily Note     Today's date: 2020  Patient name: Tristin Phillips  : 1965  MRN: [id number]  Referring provider: Odessa Mckenzie PT  Dx:   Encounter Diagnosis     ICD-10-CM    1  Chronic midline low back pain without sciatica M54 5     G89 29        Start Time: 1100  Stop Time: 1140  Total time in clinic (min): 40 minutes  The patient was treated by SARIKA Hdz under direct supervision of Laina Esquivel DPT    Subjective: Pt reported having a nerve ablation last Friday and that he felt good over the weekend however is in a bit of pain today  Objective: See treatment diary below      Assessment: Pt tolerated all exercises with no increase in pain  Pt tolerated manual treatment well demonstrating increased B/L hip IR however his R was still more restricted  Pt consented to Gr  V mobilization of the thoracic spine  Pt required verbal cueing and physical demonstration for bird dogs in order to brace core and keep a neutral spine, after which he was successful and reported feeling a difference  Pt reported that his back feels much better completing exercises since his ablation  Pt is a   good candidate for continued therapy in order to increase functional strengthening and hip/spine mobility   Plan: Continue per plan of care        Precautions: HTN      Manuals       Sacral PA  8' Sacral rocking 7' Gr III Sacral rocking 6' Gr III Sacral rocking 6' Gr III Sacral rocking 6' Gr III Sacral rocking 6' Gr III Sacral rocking 6' Gr III     Hip extension mobilizations  Gr II 4'  Gr III 4' B DC DC        Sciatic N glides   4' B 4' B NP        Hip IR mobilization in prone   nv Gr III 3' B Gr III 3' B Gr III 3' B Gr III 3' B Gr III 3' B     Mid thoracic mobilizations      Gr V Gr V Gr V     Neuro Re-Ed             Pain neuroscience education  nv 13'  15' 5' 5'        TrA in hooklying  performed           TrA March  5x on each            TrA bridge   10x with assistance 10x without assistance 10x          TrA SLS             TrA SLR    nv         Prone LE lifts with TrA  5x on each 10x on each 10x on each          Multifidus press with TB    nv  At [facility] 8" 3"x10 B At [employer] 8" 3"x10 B At [employer] 8" 2x10 B     Dead bugs progression   LE eccentric march x10 on each  LE eccentric march x10 on each   With PB alt UE LE ext x 10   With PB alt UE LE ext x10     Active 90/90 N glides   10x          Quad UE/LE lifts   nv LE lifts only 10x on each with cueing  10x each with cueing 10x each with cueing 2x10   2x10 w cueing     Xwalks with TrA   nv    4x15 ft rtb 2x5 15 ft RTB     Side bridge   nv 10x on each          Dead lifts with DB   nv          Ab roller with PB     10x        Ther Ex             Bicycle              Hip hinge - slow progression   10x with dowel 10x with dowel   using BTB 30x        LAT pull down with TrA    nv  nv 2x10 with SLS on each LE- 20 lbs 2x10 with SLS on each LE- 20 lbs     [address]   5' 5' 5' 5' 5' 5'     Hamstring stretch     On step 5x10" B On step 5x10" B 3x30" B on step  3x30" B on step      Pelvic Rocking into flexion in quadruped      10x5" 10x5" 10x5"                               Ther Activity             Sit to stand squats with KB     2x10 KB  2x10 KB 10lbs 2x20 KB 10lbs     Reverse lunge with Valslide      10x on each with TrA and upright trunk       Floor to half kneeling-to standing transfers      5'       Gait Training                                       Modalities                                       1:1 with PT from [id number]

## 2020-07-15 NOTE — PROGRESS NOTES
Daily Note     Today's date: 7/15/2020  Patient name: Whitney Ross  : 1965  MRN: [id number]  Referring provider: Kimberly Butts PT  Dx: No diagnosis found  Subjective: No new complaints today  The patient reports improvement in symptoms since previous session  Objective: See treatment diary below      Assessment: The PT continued the patient's current intervention program during today's treatment  Current exercises were maintained or progressed to promote consistency of care in line with the primary therapist's established plan  The patient tolerated manual and active treatment well today  Addition of trunk rotation from top-down as well as a kneeling adductor slide for eccentric control of body weight while in a kneeling position added in response to the patient reporting both of those activities were still very challenging  Plan: Continue per plan of care        Precautions: HTN      Manuals      Sacral PA  8' Sacral rocking 7' Gr III Sacral rocking 6' Gr III Sacral rocking 6' Gr III Sacral rocking 6' Gr III Sacral rocking 6' Gr III Sacral rocking 6' Gr III Sacral rocking 6' Gr III SRB    Hip extension mobilizations  Gr II 4'  Gr III 4' B DC DC        Sciatic N glides   4' B 4' B NP        Hip IR mobilization in prone   nv Gr III 3' B Gr III 3' B Gr III 3' B Gr III 3' B Gr III 3' B SRB    Mid thoracic mobilizations      Gr V Gr V Gr V GR V    Neuro Re-Ed             Pain neuroscience education  nv 13'  15' 5' 5'        TrA in hooklying  performed           TrA March  5x on each            TrA bridge   10x with assistance 10x without assistance 10x          TrA SLS             TrA SLR    nv         Prone LE lifts with TrA  5x on each 10x on each 10x on each          Multifidus press with TB    nv  At [facility]" 3"x10 B [facility] [employer]" 3"x10 B At [facility]" 2x10 B At [facility]" 2x10 B    Dead bugs progression   LE eccentric march x10 on each  LE eccentric march x10 on each   With PB alt UE LE ext x 10   With PB alt UE LE ext x10 With PB alt UE LE ext x10    Active 90/90 N glides   10x          Quad UE/LE lifts   nv LE lifts only 10x on each with cueing  10x each with cueing 10x each with cueing 2x10   2x10 w cueing 2x10 w cueing    Xwalks with TrA   nv    4x15 ft rtb 2x5 15 ft RTB 2x5 15 ft RTB    Side bridge   nv 10x on each          Dead lifts with DB   nv          Ab roller with PB     10x        Ther Ex             Bicycle              Hip hinge - slow progression   10x with dowel 10x with dowel   using BTB 30x        LAT pull down with TrA    nv  nv 2x10 with SLS on each LE- 20 lbs 2x10 with SLS on each LE- 20 lbs 2x10 with SLS on each LE- 20 lbs    [address]   5' 5' 5' 5' 5' 5' 5'    Hamstring stretch     On step 5x10" B On step 5x10" B 3x30" B on step  3x30" B on step  3x30" B on step     Pelvic Rocking into flexion in quadruped      10x5" 10x5" 10x5" 10x5"    Sidelying UTR "open book" stretch         5" x10 ea                 Ther Activity             Sit to stand squats with KB     2x10 KB  2x10 KB 10lbs 2x20 KB 10lbs 2x20 KB 10lbs    Reverse lunge with Valslide      10x on each with TrA and upright trunk       Floor to half kneeling-to standing transfers      5'   Kneeling adductor slide-outs 2x15 ea    Gait Training                                       Modalities

## 2020-07-16 ENCOUNTER — OFFICE VISIT (OUTPATIENT)
Dept: PHYSICAL THERAPY | Facility: CLINIC | Age: 55
End: 2020-07-16
Payer: COMMERCIAL

## 2020-07-16 DIAGNOSIS — G89.29 CHRONIC MIDLINE LOW BACK PAIN WITHOUT SCIATICA: Primary | ICD-10-CM

## 2020-07-16 DIAGNOSIS — M54.50 CHRONIC MIDLINE LOW BACK PAIN WITHOUT SCIATICA: Primary | ICD-10-CM

## 2020-07-16 PROCEDURE — 97140 MANUAL THERAPY 1/> REGIONS: CPT | Performed by: PHYSICAL THERAPIST

## 2020-07-16 PROCEDURE — 97112 NEUROMUSCULAR REEDUCATION: CPT | Performed by: PHYSICAL THERAPIST

## 2020-07-16 PROCEDURE — 97110 THERAPEUTIC EXERCISES: CPT | Performed by: PHYSICAL THERAPIST

## 2020-07-20 NOTE — PROGRESS NOTES
Daily Note     Today's date: 2020  Patient name: Aravind Deluca  : 1965  MRN: [id number]  Referring provider: Kody Corona PT  Dx:   Encounter Diagnosis     ICD-10-CM    1  Chronic midline low back pain without sciatica M54 5     G89 29                 The patient was treated by SARIKA Hdz under direct supervision of Shilo Friend DPT    Subjective: Pt reports that his back is hurting him a bit today which he contributes to not doing his exercises over the weekend      Objective: See treatment diary below      Assessment: Tolerated treatment well demonstrating immediate increase in lumbar/thoracic extension after Grade V mobilization  Pt demonstrated increased hip IR with the utilization of contract/relax technique  Pt reported his back feeling better following treatment  Cat thoracic flexion in quadruped intervention was added to pt's program to promote increased/continued thoracic mobility as the pt reported that much of the pain he ws having upon entering the clinic was relieved with thoracic Gr  V mobilization  Patient demonstrated fatigue post treatment, exhibited good technique with therapeutic exercises and would benefit from continued PT      Plan: Continue per plan of care        Precautions: HTN      Manuals     Sacral PA  8' Sacral rocking 7' Gr III Sacral rocking 6' Gr III Sacral rocking 6' Gr III Sacral rocking 6' Gr III Sacral rocking 6' Gr III Sacral rocking 6' Gr III Sacral rocking 6' Gr III SRB Sacral rocking 6' Gr III EM   Hip extension mobilizations  Gr II 4'  Gr III 4' B DC DC        Sciatic N glides   4' B 4' B NP        Hip IR mobilization in prone   nv Gr III 3' B Gr III 3' B Gr III 3' B Gr III 3' B Gr III 3' B SRB EM, contract/relax   Mid thoracic mobilizations      Gr V Gr V Gr V GR V GR V   Neuro Re-Ed             Pain neuroscience education  nv 13'  15' 5' 5'        TrA in hooklying  performed           TrA  on each TrA bridge   10x with assistance 10x without assistance 10x          TrA SLS             TrA SLR    nv         Prone LE lifts with TrA  5x on each 10x on each 10x on each          Multifidus press with TB    nv  At [facility]" 3"x10 B [facility]" 3"x10 B At [facility]" 2x10 B At [facility]" 2x10 B At [facility] 8" 2x10 B   Dead bugs progression   LE eccentric march x10 on each  LE eccentric march x10 on each   With PB alt UE LE ext x 10   With PB alt UE LE ext x10 With PB alt UE LE ext x10 With PB alt UE LE ext x10   Active 90/90 N glides   10x          Quad UE/LE lifts   nv LE lifts only 10x on each with cueing  10x each with cueing 10x each with cueing 2x10   2x10 w cueing 2x10 w cueing 2x10 w cueing w bolster on back   Xwalks with TrA   nv    4x15 ft rtb 2x5 15 ft RTB 2x5 15 ft RTB 2x5 15 ft RTB   Side bridge   nv 10x on each          Dead lifts with DB   nv          Ab roller with PB     10x        Cat          2x10   Ther Ex             Bicycle              Hip hinge - slow progression   10x with dowel 10x with dowel   using BTB 30x        LAT pull down with TrA    nv  nv 2x10 with SLS on each LE- 20 lbs 2x10 with SLS on each LE- 20 lbs 2x10 with SLS on each LE- 20 lbs 2x10 with SLS on each LE- 20 lbs   [address]   5' 5' 5' 5' 5' 5' 5' 5'   Hamstring stretch     On step 5x10" B On step 5x10" B 3x30" B on step  3x30" B on step  3x30" B on step     Pelvic Rocking into flexion in quadruped      10x5" 10x5" 10x5" 10x5" 10x5"   Sidelying UTR "open book" stretch         5" x10 ea 5" x10 ea                Ther Activity             Sit to stand squats with KB     2x10 KB  2x10 KB 10lbs 2x20 KB 10lbs 2x20 KB 10lbs 2x20 12lbs dumbbell   Reverse lunge with Valslide      10x on each with TrA and upright trunk       Floor to half kneeling-to standing transfers      5'   Kneeling adductor slide-outs 2x15 ea Kneeling adductor slide-outs 2x15 ea   Gait Training                                       Modalities PT 1:1 from [id number]

## 2020-07-21 ENCOUNTER — OFFICE VISIT (OUTPATIENT)
Dept: PHYSICAL THERAPY | Facility: CLINIC | Age: 55
End: 2020-07-21
Payer: COMMERCIAL

## 2020-07-21 DIAGNOSIS — G89.29 CHRONIC MIDLINE LOW BACK PAIN WITHOUT SCIATICA: Primary | ICD-10-CM

## 2020-07-21 DIAGNOSIS — M54.50 CHRONIC MIDLINE LOW BACK PAIN WITHOUT SCIATICA: Primary | ICD-10-CM

## 2020-07-21 PROCEDURE — 97110 THERAPEUTIC EXERCISES: CPT | Performed by: PHYSICAL THERAPIST

## 2020-07-21 PROCEDURE — 97112 NEUROMUSCULAR REEDUCATION: CPT | Performed by: PHYSICAL THERAPIST

## 2020-07-21 PROCEDURE — 97140 MANUAL THERAPY 1/> REGIONS: CPT | Performed by: PHYSICAL THERAPIST

## 2020-07-23 ENCOUNTER — APPOINTMENT (OUTPATIENT)
Dept: PHYSICAL THERAPY | Facility: CLINIC | Age: 55
End: 2020-07-23
Payer: COMMERCIAL

## 2020-07-24 ENCOUNTER — OFFICE VISIT (OUTPATIENT)
Dept: PHYSICAL THERAPY | Facility: CLINIC | Age: 55
End: 2020-07-24
Payer: COMMERCIAL

## 2020-07-24 DIAGNOSIS — G89.29 CHRONIC MIDLINE LOW BACK PAIN WITHOUT SCIATICA: Primary | ICD-10-CM

## 2020-07-24 DIAGNOSIS — M54.50 CHRONIC MIDLINE LOW BACK PAIN WITHOUT SCIATICA: Primary | ICD-10-CM

## 2020-07-24 PROCEDURE — 97110 THERAPEUTIC EXERCISES: CPT | Performed by: PHYSICAL THERAPIST

## 2020-07-24 PROCEDURE — 97112 NEUROMUSCULAR REEDUCATION: CPT | Performed by: PHYSICAL THERAPIST

## 2020-07-24 PROCEDURE — 97140 MANUAL THERAPY 1/> REGIONS: CPT | Performed by: PHYSICAL THERAPIST

## 2020-07-24 NOTE — PROGRESS NOTES
Daily Note     Today's date: 2020  Patient name: Aravind Deluca  : 1965  MRN: [id number]  Referring provider: Kody Corona PT  Dx:   Encounter Diagnosis     ICD-10-CM    1  Chronic midline low back pain without sciatica M54 5     G89 29        Start Time: 0850  Stop Time: 09  Total time in clinic (min): 53 minutes    Subjective: Pt notes this week being a "rough week" in terms of performing exercises  "With the baby and my wife back at school, I didn't have as much time on my hands " Pt does have relief with the exercises and the fact that he was able to perform them as diligently he has increase in symptoms  Objective: See treatment diary below      Assessment: Pt notes good overall improvement with the RT L3-5 RFA procedure and PT intervention  Initiated march/hip hike at [facility] with pelvic and trunk neutral to increase lumbo-pelvic stabilization  Also progressed program to include chops and reverse chops (PNF) to increase core strength  Tolerated treatment well  Patient would benefit from continued PT  Plan: Continue per plan of care  Re-evaluate patient next session        Precautions: HTN      Manuals    Sacral PA Sacral rocking 6' Gr III     Sacral rocking 6' Gr III Sacral rocking 6' Gr III Sacral rocking 6' Gr III Sacral rocking 6' Gr III SRB Sacral rocking 6' Gr III EM   Hip extension mobilizations             Sciatic N glides             Hip IR mobilization in prone Gr III 3' B     Gr III 3' B Gr III 3' B Gr III 3' B SRB EM, contract/relax   Mid thoracic mobilizations Gr V     Gr V Gr V Gr V GR V GR V   Neuro Re-Ed             Pain neuroscience education              TrA in hooklying             TrA March             TrA bridge              TrA SLS             TrA SLR             Prone LE lifts with TrA             Multifidus press with TB      At [facility]" 3"x10 B At [facility]" 3"x10 B At [facility]" 2x10 B At [facility]" 2x10 B At [facility] 8" 2x10 B Dead bugs progression      With PB alt UE LE ext x 10   With PB alt UE LE ext x10 With PB alt UE LE ext x10 With PB alt UE LE ext x10   Active 90/90 N glides             Quad UE/LE lifts      10x each with cueing 2x10   2x10 w cueing 2x10 w cueing 2x10 w cueing w bolster on back   Xwalks with TrA       4x15 ft rtb 2x5 15 ft RTB 2x5 15 ft RTB 2x5 15 ft RTB   Side bridge             Dead lifts with DB             Ab roller with PB             Cat          2x10   Chops @ [place]  10x 7 5 # B/L             R  Chops @ [place]  10x 5# B/L             Shoulder press with TrA- progress to SLS nv            Ther Ex             Bicycle              Hip hinge - slow progression      using BTB 30x        LAT pull down with TrA 2x10 with SLS on each LE- 20 lbs     nv 2x10 with SLS on each LE- 20 lbs 2x10 with SLS on each LE- 20 lbs 2x10 with SLS on each LE- 20 lbs 2x10 with SLS on each LE- 20 lbs   [address] 5'     5' 5' 5' 5' 5'   Hamstring stretch      On step 5x10" B 3x30" B on step  3x30" B on step  3x30" B on step     Pelvic Rocking into flexion in quadruped 10x5"     10x5" 10x5" 10x5" 10x5" 10x5"   Sidelying UTR "open book" stretch 5" x10 ea        5" x10 ea 5" x10 ea                Ther Activity             Sit to stand squats with KB       2x10 KB 10lbs 2x20 KB 10lbs 2x20 KB 10lbs 2x20 12lbs dumbbell   Reverse lunge with Valslide      10x on each with TrA and upright trunk       Floor to half kneeling-to standing transfers      5'   Kneeling adductor slide-outs 2x15 ea Kneeling adductor slide-outs 2x15 ea   [place] march in pelvic neutral and TrA 10x            Gait Training                                       Modalities                                       PT 1:1 from [phone]

## 2020-07-28 ENCOUNTER — EVALUATION (OUTPATIENT)
Dept: PHYSICAL THERAPY | Facility: CLINIC | Age: 55
End: 2020-07-28
Payer: COMMERCIAL

## 2020-07-28 ENCOUNTER — TELEMEDICINE (OUTPATIENT)
Dept: FAMILY MEDICINE CLINIC | Facility: CLINIC | Age: 55
End: 2020-07-28
Payer: COMMERCIAL

## 2020-07-28 DIAGNOSIS — Z20.828 EXPOSURE TO SARS-ASSOCIATED CORONAVIRUS: ICD-10-CM

## 2020-07-28 DIAGNOSIS — M54.50 CHRONIC MIDLINE LOW BACK PAIN WITHOUT SCIATICA: Primary | ICD-10-CM

## 2020-07-28 DIAGNOSIS — Z20.828 EXPOSURE TO SARS-ASSOCIATED CORONAVIRUS: Primary | ICD-10-CM

## 2020-07-28 DIAGNOSIS — G89.29 CHRONIC MIDLINE LOW BACK PAIN WITHOUT SCIATICA: Primary | ICD-10-CM

## 2020-07-28 PROBLEM — R05.8 POST-VIRAL COUGH SYNDROME: Status: RESOLVED | Noted: 2020-01-21 | Resolved: 2020-07-28

## 2020-07-28 PROCEDURE — 97110 THERAPEUTIC EXERCISES: CPT | Performed by: PHYSICAL THERAPIST

## 2020-07-28 PROCEDURE — 99214 OFFICE O/P EST MOD 30 MIN: CPT | Performed by: PHYSICIAN ASSISTANT

## 2020-07-28 PROCEDURE — U0003 INFECTIOUS AGENT DETECTION BY NUCLEIC ACID (DNA OR RNA); SEVERE ACUTE RESPIRATORY SYNDROME CORONAVIRUS 2 (SARS-COV-2) (CORONAVIRUS DISEASE [COVID-19]), AMPLIFIED PROBE TECHNIQUE, MAKING USE OF HIGH THROUGHPUT TECHNOLOGIES AS DESCRIBED BY CMS-2020-01-R: HCPCS

## 2020-07-28 PROCEDURE — 97112 NEUROMUSCULAR REEDUCATION: CPT | Performed by: PHYSICAL THERAPIST

## 2020-07-28 PROCEDURE — 97140 MANUAL THERAPY 1/> REGIONS: CPT | Performed by: PHYSICAL THERAPIST

## 2020-07-28 NOTE — PROGRESS NOTES
[id number]    Today's date: 2020  Patient name: Oleg Ordoñez  : 1965  MRN: [id number]  Referring provider: Zell Bloch, PT  Dx:   Encounter Diagnosis     ICD-10-CM    1  Chronic midline low back pain without sciatica M54 5     G89 29        Start Time: 730  Stop Time: 0815  Total time in clinic (min): 45 minutes    Assessment  Assessment details: Florence Kaminski reports good progress over the past 6 weeks of PT  He states it's immensely improved " Pt recently received a right L3-5 RFA procedure with Dr Jannette King  Pt continues to experience high levels of pain /10 with getting out of bed, however, symptoms are only transient and immediately reduce when he get ups/transitions out of position  Overall, symptoms frequency is much less  Functional status measure has improved to 60  Lumbar spine ROM has improved in extension and side bending with less discomfort  Pt no longer demonstrates a gowers sign with return to neutral from flexion  However, mild aberrant movements continue with lumbar spine ROM  No instability catch at this time compared to initial evaluation  Strength is LE is excellent (grossly 4+/5 to 5/5 in all planes)  Pt able to perform a deep squat without pain and is now performing sit to stand transfers with less pain  Pt continues to have difficulty and pain with bed mobility and transfers off the floor when playing with his 11 month old son  Overall, patient has made steady progress toward goals and would benefit from additional PT at this time  The goal of treatment continues to be spinal stabilization to reduce signs of instability and improve transfers, bed mobility, and reflex core strength  Pt will now be transitioned to 1x per week over the next 6 weeks  Oleg Ordoñez is a 47 y o  male who presents with signs and symptoms consistent of chronic and persistent LBP that is non-radicular in nature    Pt has been complaining of centralized LBP for years; however, his symptoms have been progressively worsening over the past year  Patient has failed PT at a different location and has been getting relief with treatment from pain management  His next injection is scheduled for 7/10/20  Patient presents with kinesophobia, pain catastrophization, and a multifactorial pain experience  His has significant loss of lumbar spine motion with aberrant movement in the sagittal plane  Patient present with a positive instability catch at mid range flexion and a gowers signs with return to neutral from flexion  Pt has difficulty with transfers, bed mobility, and sleeping  He has a poor diet and reports a 30lbs weight gain recently  He is a father to a 11 month old boy who interprets patients sleep  Patient only sleeps 3-4 hours per night  Patient would benefit from skilled physical therapy to address the impairments, improve their level of function, and to improve their overall quality of life  Pt will need physiologically informed practice and a multimodal treatment to improve pain condition  Impairments: abnormal muscle tone, abnormal or restricted ROM, abnormal movement, activity intolerance, difficulty understanding, lacks appropriate home exercise program, pain with function and poor body mechanics  Understanding of Dx/Px/POC: fair   Prognosis: fair    Goals  STG: To be achieved in 4 -6 weeks  1)Improve lumbar spine ROM by 10 degrees without aberrant movement -Partially met  2) Improve right SLR by 5-10 degrees without reproduction of LBP-Partially met  3)Pt will demonstrate no gowers sign or instability catch with sagittal plane ROM testing   -MET    LTG: To be achieved at Discharge  1)Patient is independent with HEP-MET  2)Improve bed mobility to WNL--Partially met  3)Improve tolerance to prolonged sitting, standing, and walking to prior level of function-Partially met  4)Improve sleep to 5+ hours per night-Partially met (depending on 11 month old sleeping schedule)      Plan  Patient would benefit from: skilled physical therapy  Planned therapy interventions: joint mobilization, manual therapy, neuromuscular re-education, graded motor, graded exercise, graded activity, home exercise program, patient education, therapeutic activities and therapeutic exercise  Frequency: 1x per week for 6 weeks  Treatment plan discussed with: patient        Subjective Evaluation    History of Present Illness  Mechanism of injury: History of Current Injury: Pt reports having lower back pain for 1 year now without any relief  Pt notes symptoms have been gradual and progressive in nature  Pt states that he has had intermittent lower back pain for years now  Pt denies any numbness/tingling in the lower extremity  He has been manage with PT prior without much success but has had good relief with RF nerve ablasion and injections with Dr Justin Walton  Pt states that he put on 30 pounds since this  Pt used to be very active but now can't even cut his lawn  Pt is also afraid to get on his bicycle because his he concern his lower back with bother him  Pain location/Descriptors: Small of the lumbar spine without radiating symptoms  Patient notes increase of symptoms in shoulder and neck  Family is very concerned about his condition  Pt feels his symptoms are never going to improve  Pt just had a son 6 months ago  He is having a hard time taking care of him  Pt states that he is getting minimal sleep (3-4 hours of sleep)  Pt notes that his diet has worsened since COVID-19  Aggravating factors: transitional movements, pushing/pulling  Easing factors: Injections, RF ablasions, just sits down and deals with symptoms  "the less I do the less it hurts " Ice  24 HR pattern: Dependent on what patient does (lie down, getting up, transitioning, getting out of bed)    Imaging: MRI and bending XR show anterior grade 2 listhesis at L5-S1   Special Questions: Pt denies any new onset bowel/bladder changes, saddle anesthesia, weakness in the extremities, infection/fever, blood in stool urine, and no history of cancer  Patient goals:  Improve pain upon waking up  Hobbies/Interest: bicycling, wood worker (has shop at home)  Occupation: Self employed (consultant) Wife works for [employer]       Pain  Current pain ratin  At best pain ratin  At worst pain ratin      Diagnostic Tests  MRI studies: abnormal  Treatments  Previous treatment: injection treatment, physical therapy and medication  Current treatment: speech therapy  Patient Goals  Patient goals for therapy: decreased pain, increased motion and return to sport/leisure activities          Objective     Neurological Testing     Sensation     Lumbar   Left   Intact: light touch    Right   Intact: light touch    Reflexes   Left   Patellar (L4): normal (2+)  Achilles (S1): normal (2+)  Clonus sign: negative    Right   Patellar (L4): normal (2+)  Achilles (S1): normal (2+)  Clonus sign: negative    Additional Neurological Details  1 beat non-sustained clonus     Active Range of Motion     Lumbar   Flexion: 85 degrees   Extension: 30 degrees  with pain  Left lateral flexion: 35 degrees    with pain  Right lateral flexion: 45 degrees     Additional Active Range of Motion Details  *Aberrant movement in the sagittal plane- Reduced aberrant movement with all planes of motion but still present with return to neutral from flexion and L SB   *No Instability catch at mid range flexion   *No Gowers sign present from flexion to neutral        Strength/Myotome Testing     Lumbar   Left   Heel walk: normal  Toe walk: normal    Right   Heel walk: normal  Toe walk: normal    Left Hip   Planes of Motion   Flexion: 4+  Abduction: 4+    Right Hip   Planes of Motion   Flexion: 4+  Abduction: 4+    Left Knee   Flexion: 5  Extension: 5    Right Knee   Flexion: 5  Extension: 5    Left Ankle/Foot   Dorsiflexion: 5  Plantar flexion: 5  Great toe extension: 5    Right Ankle/Foot   Dorsiflexion: 5  Plantar flexion: 5  Great toe extension: 5    Tests     Lumbar     Left   Negative passive SLR  Right   Negative passive SLR  Left Pelvic Girdle/Sacrum   Negative: thigh thrust      Right Pelvic Girdle/Sacrum   Negative: thigh thrust      Left Hip   Negative GÉNESIS and FADIR  Right Hip   Negative GÉNESIS and FADIR  Additional Tests Details  Increased neural tension with R SLR at 55 degrees   Hamstring stretch L SLR at 55 degrees     Hip extension: ~15 degrees B  Hip IR:  Normalized to 45 deg L and >45 deg R    Ambulation     Observational Gait   Gait: antalgic and asymmetric   Decreased walking speed and stride length  Additional Observational Gait Details  Bed mobility, sit to stand tranfers, supine to sit: hesitant, slow, and painful        Flowsheet Rows      Most Recent Value   PT/OT G-Codes   Current Score  60   Projected Score  60             Precautions: HTN      Manuals 7/24 7/30 7/7 7/9 7/14 7/16 7/21   Sacral PA Sacral rocking 6' Gr III     Sacral rocking 6' Gr III Sacral rocking 6' Gr III Sacral rocking 6' Gr III Sacral rocking 6' Gr III SRB Sacral rocking 6' Gr III EM   Hip extension mobilizations             Sciatic N glides             Hip IR mobilization in prone Gr III 3' B     Gr III 3' B Gr III 3' B Gr III 3' B SRB EM, contract/relax   Mid thoracic mobilizations Gr V     Gr V Gr V Gr V GR V GR V   Neuro Re-Ed             Pain neuroscience education              TrA in hooklying             TrA March             TrA bridge              TrA SLS             TrA SLR             Prone LE lifts with TrA             Multifidus press with TB      At [facility]" 3"x10 B At [facility]" 3"x10 B At [facility] 8" 2x10 B At [facility] 8" 2x10 B At [employer] 8" 2x10 B   Dead bugs progression  Perform with longer holds nv     With PB alt UE LE ext x 10   With PB alt UE LE ext x10 With PB alt UE LE ext x10 With PB alt UE LE ext x10   Active 90/90 N glides             Quad UE/LE lifts  Perform with longer holds nv    10x each with cueing 2x10   2x10 w cueing 2x10 w cueing 2x10 w cueing w bolster on back   Xwalks with TrA       4x15 ft rtb 2x5 15 ft RTB 2x5 15 ft RTB 2x5 15 ft RTB   Side bridge             Dead lifts with DB             Ab roller with PB             Cat          2x10   Chops @ [place]  10x 7 5 # B/L  nv           R   Chops @ [place]  10x 5# B/L  nv           Shoulder press with TrA- progress to SLS nv nv           Slump stretch against wall   nv           Ther Ex             Bicycle              Hip hinge - slow progression      using BTB 30x        LAT pull down with TrA 2x10 with SLS on each LE- 20 lbs     nv 2x10 with SLS on each LE- 20 lbs 2x10 with SLS on each LE- 20 lbs 2x10 with SLS on each LE- 20 lbs 2x10 with SLS on each LE- 20 lbs   [address] 5'     5' 5' 5' 5' 5'   Hamstring stretch      On step 5x10" B 3x30" B on step  3x30" B on step  3x30" B on step     Pelvic Rocking into flexion in quadruped 10x5"     10x5" 10x5" 10x5" 10x5" 10x5"   Sidelying UTR "open book" stretch 5" x10 ea        5" x10 ea 5" x10 ea                Ther Activity             Sit to stand squats with KB       2x10 KB 10lbs 2x20 KB 10lbs 2x20 KB 10lbs 2x20 12lbs dumbbell   Reverse lunge with [place]      10x on each with TrA and upright trunk       Floor to half kneeling-to standing transfers      5'   Kneeling adductor slide-outs 2x15 ea Kneeling adductor slide-outs 2x15 ea   [place] march in pelvic neutral and TrA 10x            Gait Training                                       Modalities                                       Pt was re-evaluated today x 45 minutes with appropriate education
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
## 2020-07-29 LAB — SARS-COV-2 RNA SPEC QL NAA+PROBE: NOT DETECTED

## 2020-07-30 ENCOUNTER — APPOINTMENT (OUTPATIENT)
Dept: PHYSICAL THERAPY | Facility: CLINIC | Age: 55
End: 2020-07-30
Payer: COMMERCIAL

## 2020-08-07 ENCOUNTER — OFFICE VISIT (OUTPATIENT)
Dept: PHYSICAL THERAPY | Facility: CLINIC | Age: 55
End: 2020-08-07
Payer: COMMERCIAL

## 2020-08-07 DIAGNOSIS — G89.29 CHRONIC MIDLINE LOW BACK PAIN WITHOUT SCIATICA: Primary | ICD-10-CM

## 2020-08-07 DIAGNOSIS — M54.50 CHRONIC MIDLINE LOW BACK PAIN WITHOUT SCIATICA: Primary | ICD-10-CM

## 2020-08-07 PROCEDURE — 97110 THERAPEUTIC EXERCISES: CPT | Performed by: PHYSICAL THERAPIST

## 2020-08-07 PROCEDURE — 97112 NEUROMUSCULAR REEDUCATION: CPT | Performed by: PHYSICAL THERAPIST

## 2020-08-07 PROCEDURE — 97140 MANUAL THERAPY 1/> REGIONS: CPT | Performed by: PHYSICAL THERAPIST

## 2020-08-07 NOTE — PROGRESS NOTES
Daily Note     Today's date: 2020  Patient name: Preston Roach  : 1965  MRN: [id number]  Referring provider: Elizabeth Chavis PT  Dx:   Encounter Diagnosis     ICD-10-CM    1  Chronic midline low back pain without sciatica  M54 5     G89 29        Start Time: 1115  Stop Time: 1200  Total time in clinic (min): 45 minutes    Subjective: Pt had a COVID-19 test and was negative  He states he was doing very well until he past few days  He c/o of increasing LBP  Objective: See treatment diary below      Assessment: Tolerated treatment well  Excellent form with lumbar stabilization exercises  Initiated forward planks with PB  Patient demonstrated fatigue post treatment and would benefit from continued PT  Plan: Continue per plan of care        Precautions: HTN      Manuals    Sacral PA Sacral rocking 6' Gr III  Sacral rocking 6' Gr III   Sacral rocking 6' Gr III Sacral rocking 6' Gr III Sacral rocking 6' Gr III Sacral rocking 6' Gr III SRB Sacral rocking 6' Gr III EM   Hip extension mobilizations             Sciatic N glides             Hip IR mobilization in prone Gr III 3' B  DC   Gr III 3' B Gr III 3' B Gr III 3' B SRB EM, contract/relax   Mid thoracic mobilizations Gr V  Gr V   Gr V Gr V Gr V GR V GR V   Neuro Re-Ed             Pain neuroscience education              TrA in hooklying             TrA March             TrA bridge              TrA SLS             TrA SLR             Prone LE lifts with TrA             Multifidus press with TB      At [facility] 8" 3"x10 B At [employer] 8" 3"x10 B At [employer] 8" 2x10 B At [employer] 8" 2x10 B At [employer] 8" 2x10 B   Dead bugs progression  Perform with longer holds nv  nv   With PB alt UE LE ext x 10   With PB alt UE LE ext x10 With PB alt UE LE ext x10 With PB alt UE LE ext x10   Active 90/90 N glides             PB forward planks   10x5"          Quad UE/LE lifts  Perform with longer holds nv 20x5" each   10x each with cueing 2x10   2x10 w cueing 2x10 w cueing 2x10 w cueing w bolster on back   Xwalks with TrA       4x15 ft rtb 2x5 15 ft RTB 2x5 15 ft RTB 2x5 15 ft RTB   Side bridge             Dead lifts with DB             Ab roller with PB             Cat          2x10   Chops @ [place]  10x 7 5 # B/L  nv nv          R   Chops @ [place]  10x 5# B/L  nv nv          Shoulder press with TrA- progress to SLS nv nv 2x10 B/L with 10# DBs          Slump stretch against wall   nv 10x10"           Ther Ex             Bicycle              Hip hinge - slow progression      using BTB 30x        LAT pull down with TrA 2x10 with SLS on each LE- 20 lbs     nv 2x10 with SLS on each LE- 20 lbs 2x10 with SLS on each LE- 20 lbs 2x10 with SLS on each LE- 20 lbs 2x10 with SLS on each LE- 20 lbs   [address] 5'  5'   5' 5' 5' 5' 5'   Hamstring stretch      On step 5x10" B 3x30" B on step  3x30" B on step  3x30" B on step     Pelvic Rocking into flexion in quadruped 10x5"  10x5"   10x5" 10x5" 10x5" 10x5" 10x5"   Sidelying UTR "open book" stretch 5" x10 ea        5" x10 ea 5" x10 ea   Mid thoracic extension over half foam roller   2x30"          Ther Activity             Sit to stand squats with KB       2x10 KB 10lbs 2x20 KB 10lbs 2x20 KB 10lbs 2x20 12lbs dumbbell   Reverse lunge with V[place]      10x on each with TrA and upright trunk       Floor to half kneeling-to standing transfers      5'   Kneeling adductor slide-outs 2x15 ea Kneeling adductor slide-outs 2x15 ea   [place] march in pelvic neutral and TrA 10x            Back squats   nv          Gait Training                                       Modalities                                       1:1 with PT from 1115-12p

## 2020-08-14 ENCOUNTER — OFFICE VISIT (OUTPATIENT)
Dept: PHYSICAL THERAPY | Facility: CLINIC | Age: 55
End: 2020-08-14
Payer: COMMERCIAL

## 2020-08-14 DIAGNOSIS — M54.50 CHRONIC MIDLINE LOW BACK PAIN WITHOUT SCIATICA: Primary | ICD-10-CM

## 2020-08-14 DIAGNOSIS — G89.29 CHRONIC MIDLINE LOW BACK PAIN WITHOUT SCIATICA: Primary | ICD-10-CM

## 2020-08-14 PROCEDURE — 97112 NEUROMUSCULAR REEDUCATION: CPT | Performed by: PHYSICAL THERAPIST

## 2020-08-14 PROCEDURE — 97110 THERAPEUTIC EXERCISES: CPT | Performed by: PHYSICAL THERAPIST

## 2020-08-14 PROCEDURE — 97140 MANUAL THERAPY 1/> REGIONS: CPT | Performed by: PHYSICAL THERAPIST

## 2020-08-14 NOTE — PROGRESS NOTES
Daily Note     Today's date: 2020  Patient name: Delma Bradford  : 1965  MRN: [id number]  Referring provider: Prema Dsouza, PT  Dx:   Encounter Diagnosis     ICD-10-CM    1  Chronic midline low back pain without sciatica  M54 5     G89 29        Start Time: 945  Stop Time: 1030  Total time in clinic (min): 45 minutes    Subjective: Pt notes "the back is feeling much better  Getting out of bed is still a problem but overall doing much better " Pt continues to struggle getting a full night sleep  Objective: See treatment diary below      Assessment: Progressed PB forward planks to improve endurance  Initiated side planks with kettle bell  Pt challenged with new exercise and unable to maintain position for greater than a few seconds  Minimal discomfort noted throughout treatment  Transfers much improved as no aberrant motion noted during these movement  Tolerated treatment well  Patient would benefit from continuing POC  Plan: Continue per plan of care        Precautions: HTN      Manuals          Sacral PA Sacral rocking 6' Gr III  Sacral rocking 6' Gr III Sacral rocking 6' Gr III         Hip extension mobilizations             Sciatic N glides             Hip IR mobilization in prone Gr III 3' B  DC R III 2'         Mid thoracic mobilizations Gr V  Gr V Gv          Neuro Re-Ed             Pain neuroscience education              TrA in hooklying             TrA March             TrA bridge              TrA SLS             TrA SLR             Prone LE lifts with TrA             Multifidus press with TB             Dead bugs progression  Perform with longer holds nv  nv          Active 90/90 N glides             PB forward planks   10x5" 10x10"          Quad UE/LE lifts  Perform with longer holds nv 20x5" each 10x on each with GTB           Xwalks with TrA             Side bridge             Dead lifts with DB             Ab roller with PB             Cat Chops @ [place]  10x 7 5 # B/L  nv nv 10x 10lbs          R   Chops @ [place]  10x 5# B/L  nv nv [id number]         Shoulder press with TrA- progress to SLS nv nv 2x10 B/L with 10# DBs          Slump stretch against wall   nv 10x10"  10x10"         Side planks with shoulder abduction    ykb 10x ea         Ther Ex             Bicycle              Hip hinge - slow progression             LAT pull down with TrA 2x10 with SLS on each LE- 20 lbs            [address] 5'  5' 5'         Hamstring stretch             Pelvic Rocking into flexion in quadruped 10x5"  10x5"          Sidelying UTR "open book" stretch 5" x10 ea            Mid thoracic extension over half foam roller   2x30"          Ther Activity             Sit to stand squats with KB             Reverse lunge with Valslide             Floor to half kneeling-to standing transfers             [place] march in pelvic neutral and TrA 10x            Back squats   nv nv         Gait Training                                       Modalities                                       1:1 with PT from [id number]

## 2020-08-21 ENCOUNTER — OFFICE VISIT (OUTPATIENT)
Dept: PHYSICAL THERAPY | Facility: CLINIC | Age: 55
End: 2020-08-21
Payer: COMMERCIAL

## 2020-08-21 ENCOUNTER — OFFICE VISIT (OUTPATIENT)
Dept: PAIN MEDICINE | Facility: MEDICAL CENTER | Age: 55
End: 2020-08-21
Payer: COMMERCIAL

## 2020-08-21 VITALS
TEMPERATURE: 97.5 F | SYSTOLIC BLOOD PRESSURE: 157 MMHG | HEIGHT: 67 IN | BODY MASS INDEX: 31.39 KG/M2 | WEIGHT: 200 LBS | HEART RATE: 64 BPM | DIASTOLIC BLOOD PRESSURE: 113 MMHG

## 2020-08-21 DIAGNOSIS — M43.17 SPONDYLOLISTHESIS OF LUMBOSACRAL REGION: ICD-10-CM

## 2020-08-21 DIAGNOSIS — G89.29 CHRONIC MIDLINE LOW BACK PAIN WITHOUT SCIATICA: Primary | ICD-10-CM

## 2020-08-21 DIAGNOSIS — G89.4 CHRONIC PAIN SYNDROME: ICD-10-CM

## 2020-08-21 DIAGNOSIS — M54.50 CHRONIC BILATERAL LOW BACK PAIN WITHOUT SCIATICA: Primary | ICD-10-CM

## 2020-08-21 DIAGNOSIS — M47.816 LUMBAR SPONDYLOSIS: ICD-10-CM

## 2020-08-21 DIAGNOSIS — G89.29 CHRONIC BILATERAL LOW BACK PAIN WITHOUT SCIATICA: Primary | ICD-10-CM

## 2020-08-21 DIAGNOSIS — M62.830 LUMBAR PARASPINAL MUSCLE SPASM: ICD-10-CM

## 2020-08-21 DIAGNOSIS — M54.50 CHRONIC MIDLINE LOW BACK PAIN WITHOUT SCIATICA: Primary | ICD-10-CM

## 2020-08-21 PROCEDURE — 1036F TOBACCO NON-USER: CPT | Performed by: NURSE PRACTITIONER

## 2020-08-21 PROCEDURE — 99213 OFFICE O/P EST LOW 20 MIN: CPT | Performed by: NURSE PRACTITIONER

## 2020-08-21 PROCEDURE — 97140 MANUAL THERAPY 1/> REGIONS: CPT | Performed by: PHYSICAL THERAPIST

## 2020-08-21 PROCEDURE — 3008F BODY MASS INDEX DOCD: CPT | Performed by: NURSE PRACTITIONER

## 2020-08-21 PROCEDURE — 3077F SYST BP >= 140 MM HG: CPT | Performed by: NURSE PRACTITIONER

## 2020-08-21 PROCEDURE — 97112 NEUROMUSCULAR REEDUCATION: CPT | Performed by: PHYSICAL THERAPIST

## 2020-08-21 PROCEDURE — 97110 THERAPEUTIC EXERCISES: CPT | Performed by: PHYSICAL THERAPIST

## 2020-08-21 PROCEDURE — 3080F DIAST BP >= 90 MM HG: CPT | Performed by: NURSE PRACTITIONER
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
## 2020-08-21 NOTE — PROGRESS NOTES
Daily Note     Today's date: 2020  Patient name: Shilo Aviles  : 1965  MRN: [id number]  Referring provider: Gale Plata, PT  Dx:   Encounter Diagnosis     ICD-10-CM    1  Chronic midline low back pain without sciatica  M54 5     G89 29        Start Time: 946  Stop Time: 1030  Total time in clinic (min): 44 minutes    Subjective: Pt f/u with pain management this week  Pt pleased with progress  "Everything is definitely improving " Transfers are the only painful activity as per patient  Objective: See treatment diary below      Assessment: Initiated squats with 40lbs on back to improve loading of L/S  Tolerated treatment well with appropriate fatigue  Patient exhibited good technique with therapeutic exercises and would benefit from continued PT  Plan: Continue per plan of care        Precautions: HTN      Manuals         Sacral PA Sacral rocking 6' Gr III  Sacral rocking 6' Gr III Sacral rocking 6' Gr III Sacral rocking 6' Gr III        Hip extension mobilizations             Sciatic N glides             Hip IR mobilization in prone Gr III 3' B  DC R III 2' R III 2'        Mid thoracic mobilizations Gr V  Gr V Gv  Gr V        Neuro Re-Ed             Plank walks out with LE extension     Cueing need 5x on each blue PB        Pain neuroscience education              TrA in hooklying             TrA March             TrA bridge              TrA SLS             TrA SLR             Prone LE lifts with TrA             Multifidus press with TB             Dead bugs progression  Perform with longer holds nv  nv          Active 90/90 N glides             PB forward planks   10x5" 10x10"          Quad UE/LE lifts  Perform with longer holds nv 20x5" each 10x on each with GTB   10x on each with GTB         Xwalks with TrA             Side bridge             Dead lifts with DB             Ab roller with PB             Cat             Chops @ [place]  10x 7 5 # B/L  nv nv 10x 10lbs  10x 10lbs        R   Chops @ [place]  10x 5# B/L  nv nv [id number] 10x 10lbs        Shoulder press with TrA- progress to SLS nv nv 2x10 B/L with 10# DBs          Slump stretch against wall   nv 10x10"  10x10"         Side planks with shoulder abduction    ykb 10x ea ykb 10x on each        Ther Ex             Bicycle              Hip hinge - slow progression             LAT pull down with TrA 2x10 with SLS on each LE- 20 lbs            [address] 5'  5' 5' 5'        Hamstring stretch             Pelvic Rocking into flexion in quadruped 10x5"  10x5"  10x5"        Sidelying UTR "open book" stretch 5" x10 ea            Mid thoracic extension over half foam roller   2x30"          Ther Activity             Sit to stand squats with KB             Reverse lunge with Valslide             Floor to half kneeling-to standing transfers             [place] march in pelvic neutral and TrA 10x            Back squats   nv nv 3x10 30,40lbs        Gait Training                                       Modalities                                       1:1 with PT from [id number]

## 2020-08-28 ENCOUNTER — OFFICE VISIT (OUTPATIENT)
Dept: PHYSICAL THERAPY | Facility: CLINIC | Age: 55
End: 2020-08-28
Payer: COMMERCIAL

## 2020-08-28 ENCOUNTER — OFFICE VISIT (OUTPATIENT)
Dept: FAMILY MEDICINE CLINIC | Facility: CLINIC | Age: 55
End: 2020-08-28
Payer: COMMERCIAL

## 2020-08-28 VITALS
WEIGHT: 199.4 LBS | RESPIRATION RATE: 16 BRPM | DIASTOLIC BLOOD PRESSURE: 88 MMHG | OXYGEN SATURATION: 94 % | HEIGHT: 67 IN | HEART RATE: 61 BPM | TEMPERATURE: 97.8 F | SYSTOLIC BLOOD PRESSURE: 140 MMHG | BODY MASS INDEX: 31.3 KG/M2

## 2020-08-28 DIAGNOSIS — G89.29 CHRONIC MIDLINE LOW BACK PAIN WITHOUT SCIATICA: Primary | ICD-10-CM

## 2020-08-28 DIAGNOSIS — M54.50 CHRONIC MIDLINE LOW BACK PAIN WITHOUT SCIATICA: Primary | ICD-10-CM

## 2020-08-28 DIAGNOSIS — G47.00 INSOMNIA, UNSPECIFIED TYPE: Primary | ICD-10-CM

## 2020-08-28 PROCEDURE — 97112 NEUROMUSCULAR REEDUCATION: CPT | Performed by: PHYSICAL THERAPIST

## 2020-08-28 PROCEDURE — 3077F SYST BP >= 140 MM HG: CPT | Performed by: INTERNAL MEDICINE

## 2020-08-28 PROCEDURE — 97110 THERAPEUTIC EXERCISES: CPT | Performed by: PHYSICAL THERAPIST

## 2020-08-28 PROCEDURE — 97140 MANUAL THERAPY 1/> REGIONS: CPT | Performed by: PHYSICAL THERAPIST

## 2020-08-28 PROCEDURE — 3079F DIAST BP 80-89 MM HG: CPT | Performed by: INTERNAL MEDICINE

## 2020-08-28 PROCEDURE — 3008F BODY MASS INDEX DOCD: CPT | Performed by: INTERNAL MEDICINE

## 2020-08-28 PROCEDURE — 1036F TOBACCO NON-USER: CPT | Performed by: INTERNAL MEDICINE

## 2020-08-28 PROCEDURE — 99213 OFFICE O/P EST LOW 20 MIN: CPT | Performed by: INTERNAL MEDICINE

## 2020-08-28 RX ORDER — ZOLPIDEM TARTRATE 12.5 MG/1
12.5 TABLET, FILM COATED, EXTENDED RELEASE ORAL
Qty: 10 TABLET | Refills: 0 | Status: SHIPPED | OUTPATIENT
Start: 2020-08-28 | End: 2021-04-14
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
## 2020-08-28 NOTE — PROGRESS NOTES
Daily Note     Today's date: 2020  Patient name: Tristin Phillips  : 1965  MRN: [id number]  Referring provider: Odessa Mckenzie, PT  Dx:   Encounter Diagnosis     ICD-10-CM    1  Chronic midline low back pain without sciatica  M54 5     G89 29        Start Time: 947  Stop Time: 1040  Total time in clinic (min): 53 minutes    Subjective: Pt attempted to lift a mattress this week which caused increase in centralized LBP  He did wake up with AM with less pain  Pt scheduled an appointment with PCP secondary to sleep issues  Patient states that he has been up since 3 AM and getting minimal hours of sleep per night  Objective: See treatment diary below      Assessment: Pt's poor sleep is contributing to his chronic pain state  Although patient is doing well with rehabilitation program, sleep is one pillar of pain neuroscience education that patient needs to help reduce the sensitization of the CNS  Tolerated treatment well  Minimal L/S pain with treatment today  Initiated hip thrusts on PB today with good tolerance  Patient demonstrated fatigue post treatment  Pt given pain workbook by Lc Marks to reinforce concepts of pain science education  Plan: Continue per plan of care        Precautions: HTN      Manuals        Sacral PA Sacral rocking 6' Gr III  Sacral rocking 6' Gr III Sacral rocking 6' Gr III Sacral rocking 6' Gr III Sacral rocking 6' Gr III       Hip extension mobilizations             Sciatic N glides             Hip IR mobilization in prone Gr III 3' B  DC R III 2' R III 2'        Mid thoracic mobilizations Gr V  Gr V Gv  Gr V Gr V       Neuro Re-Ed             Plank walks out with LE extension     Cueing need 5x on each blue PB 10x on each with PB       Pain neuroscience education              TrA in hooklying             TrA March             TrA bridge              TrA SLS             TrA SLR             Prone LE lifts with TrA             Multifidus press with TB             Dead bugs progression  Perform with longer holds nv  nv          Active 90/90 N glides             PB forward planks   10x5" 10x10"          Quad UE/LE lifts  Perform with longer holds nv 20x5" each 10x on each with GTB   10x on each with GTB  2x10 on each with GTB       Xwalks with TrA             Side bridge             Dead lifts with DB             Ab roller with PB             Cat             Chops @ [place]  10x 7 5 # B/L  nv nv 10x 10lbs  10x 10lbs        R   Chops @ [place]  10x 5# B/L  nv nv [id number] 10x 10lbs        Shoulder press with TrA- progress to SLS nv nv 2x10 B/L with 10# DBs          Slump stretch against wall   nv 10x10"  10x10"         Side planks with shoulder abduction    ykb 10x ea ykb 10x on each        Hip thrusts      On PB 10x10"       Ther Ex             Bicycle              Hip hinge - slow progression             LAT pull down with TrA 2x10 with SLS on each LE- 20 lbs            [address] 5'  5' 5' 5' 5'       Hamstring stretch      4x30"       Pelvic Rocking into flexion in quadruped 10x5"  10x5"  10x5" 10x5"       Sidelying UTR "open book" stretch 5" x10 ea            Mid thoracic extension over half foam roller   2x30"          Ther Activity             Sit to stand squats with KB             Reverse lunge with [place]             Floor to half kneeling-to standing transfers             [place] march in pelvic neutral and TrA 10x            Back squats   nv nv 3x10 30,40lbs 3x10 40lbs       Gait Training                                       Modalities                                       1:1 with PT from [phone]

## 2020-09-03 ENCOUNTER — TELEPHONE (OUTPATIENT)
Dept: FAMILY MEDICINE CLINIC | Facility: CLINIC | Age: 55
End: 2020-09-03

## 2020-09-03 DIAGNOSIS — G47.00 INSOMNIA, UNSPECIFIED TYPE: Primary | ICD-10-CM

## 2020-09-03 NOTE — TELEPHONE ENCOUNTER
Patient called with follow up from last visit  Insomnia not improved with meds  Would like to go to sleep specialist   Please order consult

## 2020-09-04 ENCOUNTER — EVALUATION (OUTPATIENT)
Dept: PHYSICAL THERAPY | Facility: CLINIC | Age: 55
End: 2020-09-04
Payer: COMMERCIAL

## 2020-09-04 DIAGNOSIS — M54.50 CHRONIC MIDLINE LOW BACK PAIN WITHOUT SCIATICA: Primary | ICD-10-CM

## 2020-09-04 DIAGNOSIS — G89.29 CHRONIC MIDLINE LOW BACK PAIN WITHOUT SCIATICA: Primary | ICD-10-CM

## 2020-09-04 PROCEDURE — 97112 NEUROMUSCULAR REEDUCATION: CPT | Performed by: PHYSICAL THERAPIST

## 2020-09-04 PROCEDURE — 97140 MANUAL THERAPY 1/> REGIONS: CPT | Performed by: PHYSICAL THERAPIST

## 2020-09-04 PROCEDURE — 97110 THERAPEUTIC EXERCISES: CPT | Performed by: PHYSICAL THERAPIST

## 2020-09-04 NOTE — PROGRESS NOTES
[id number]     Today's date: 2020  Patient name: Yovani Fountain  : 1965  MRN: [id number]  Referring provider: Violeta Gardiner PT  Dx:         Encounter Diagnosis       ICD-10-CM     1  Chronic midline low back pain without sciatica M54 5       G89 29         Assessment  Assessment details:  Yovani Fountain is a 47 y o  male who presents with chronic low back pain that has been consistently improving  He has increased tolerance to ADLs but continues to have twinges of pain with lifting, carrying and sit to stand transfers  Patient would benefit from skilled physical therapy to address the impairments, improve their level of function, and to improve their overall quality of life  Impairments: abnormal muscle tone, abnormal or restricted ROM, abnormal movement, activity intolerance, difficulty understandinga, pain with function and poor body mechanics  Understanding of Dx/Px/POC: fair   Prognosis: fair   Goals  STG: To be achieved in 4 -6 weeks  1)Improve lumbar spine ROM by 10 degrees without aberrant movement -Partially met  2) Improve right SLR by 5-10 degrees without reproduction of LBP-Partially met  3)Pt will demonstrate no gowers sign or instability catch with sagittal plane ROM testing  -MET    LTG: To be achieved at Discharge  1)Patient is independent with HEP-MET  2)Improve bed mobility to WNL--Partially met  3)Improve tolerance to prolonged sitting, standing, and walking to prior level of function-Partially met  4)Improve sleep to 5+ hours per night-Partially met (depending on 11 month old sleeping schedule)     Plan  Patient would benefit from: skilled physical therapy  Planned therapy interventions: joint mobilization, manual therapy, neuromuscular re-education, graded motor, graded exercise, graded activity, home exercise program, patient education, therapeutic activities and therapeutic exercise  Frequency: 1x per week for 6 weeks    Treatment plan discussed with: patient       Subjective Evaluation     History of Present Illness  Mechanism of injury:   Pt reports having lower back pain for 1 year now without any relief  Pt notes symptoms have been gradual and progressive in nature  Pt states that he has had intermittent lower back pain for years now  Patient reports continued difficulty sleeping with the low back being one of the factors  He hasn't slept well in 25 years and will be scheduling a sleep study soon  He continues to central low back pain when lying down  He has occasional twinges of pain when lifting his son  Overall his pain is 'so much better'  He will return to cutting his lawn this weekend  Aggravating factors: lifting/carrying, sit to stand transfers, lying down  Easing factors: Injections, RF ablasions, just sits down and deals with symptoms  "the less I do the less it hurts " Ice  Imaging: MRI and bending XR show anterior grade 2 listhesis at L5-S1   Patient goals:  Improve pain upon waking up  Hobbies/Interest: bicycling, wood worker (has shop at home)  Occupation: Self employed (consultant) Wife works for [employer]       Pain  Current pain ratin  At best pain ratin  At worst pain ratin (in bed), 0 5-1 5 out of bed     Patient Goals  Patient goals for therapy: decreased pain, increased motion and return to sport/leisure activitie     Objective   Active Range of Motion      Lumbar   Flexion: 85 degrees   Extension: 30 degrees  with pain only on full return to neutral  Left lateral flexion: 40 degrees  no longer has pain   Right lateral flexion: 45 degrees     Additional Active Range of Motion Details  *no longer has aberrant movement pattern   *No Instability catch at mid range flexion   *No Gowers sign present from flexion to neutral         Strength/Myotome Testing    Lumbar   Left Hip   Planes of Motion   Flexion: 5  Abduction: 4+     Right Hip   Planes of Motion   Flexion: 5  Abduction: 4+      Tests   Increased neural tension with R SLR at 55 degrees (unchanged)  Hamstring stretch L SLR at 55 degrees (unchanged_    Observational Gait   Gait: normal (improved from antalgic and asymmetric, decreased walking speed and stride length)      Additional Observational Gait Details  Bed mobility, sit to stand tranfers, supine to sit: hesitant, slow, and painful        Daily Note     Today's date: 2020  Patient name: Charissa Ramirez  : 1965  MRN: [id number]  Referring provider: Gely Nielsen PT  Dx:   Encounter Diagnosis     ICD-10-CM    1  Chronic midline low back pain without sciatica  M54 5     G89 29                    Precautions: HTN      Manuals       Sacral PA Sacral rocking 6' Gr III  Sacral rocking 6' Gr III Sacral rocking 6' Gr III Sacral rocking 6' Gr III Sacral rocking 6' Gr III Sacral rocking 6' Gr III      Hip extension mobilizations             Sciatic N glides             Hip IR mobilization in prone Gr III 3' B  DC R III 2' R III 2'        Mid thoracic mobilizations Gr V  Gr V Gv  Gr V Gr V Gr V      Neuro Re-Ed             Plank walks out with LE extension     Cueing need 5x on each blue PB 10x on each with PB 10x on each with PB      Pain neuroscience education              TrA in hooklying             TrA March             TrA bridge              TrA SLS             TrA SLR             Prone LE lifts with TrA             Multifidus press with TB             Dead bugs progression  Perform with longer holds nv  nv          Active 90/90 N glides             PB forward planks   10x5" 10x10"          Quad UE/LE lifts  Perform with longer holds nv 20x5" each 10x on each with GTB   10x on each with GTB  2x10 on each with GTB 2x10 on each with GTB      Xwalks with TrA             Side bridge             Dead lifts with DB             Ab roller with PB             Cat             Chops @ [place]  10x 7 5 # B/L  nv nv 10x 10lbs  10x 10lbs        R   Chops @ [place]  10x 5# B/L nv nv [id number] 10x 10lbs        Shoulder press with TrA- progress to SLS nv nv 2x10 B/L with 10# DBs          Slump stretch against wall   nv 10x10"  10x10"         Side planks with shoulder abduction    ykb 10x ea ykb 10x on each        Hip thrusts      On PB 10x10"       Ther Ex             Bicycle              Hip hinge - slow progression             LAT pull down with TrA 2x10 with SLS on each LE- 20 lbs            [address] 5'  5' 5' 5' 5' 5'      Hamstring stretch      4x30" 4x30"      Pelvic Rocking into flexion in quadruped 10x5"  10x5"  10x5" 10x5" 10x5"      Sidelying UTR "open book" stretch 5" x10 ea            Mid thoracic extension over half foam roller   2x30"          Ther Activity             Sit to stand squats with KB             Reverse lunge with Valslide             Floor to half kneeling-to standing transfers             [place] march in pelvic neutral and TrA 10x            Back squats   nv nv 3x10 30,40lbs 3x10 40lbs 3x10 40lbs      Gait Training                                       Modalities

## 2020-09-11 ENCOUNTER — OFFICE VISIT (OUTPATIENT)
Dept: PHYSICAL THERAPY | Facility: CLINIC | Age: 55
End: 2020-09-11
Payer: COMMERCIAL

## 2020-09-11 DIAGNOSIS — M54.50 CHRONIC MIDLINE LOW BACK PAIN WITHOUT SCIATICA: Primary | ICD-10-CM

## 2020-09-11 DIAGNOSIS — G89.29 CHRONIC MIDLINE LOW BACK PAIN WITHOUT SCIATICA: Primary | ICD-10-CM

## 2020-09-11 PROCEDURE — 97110 THERAPEUTIC EXERCISES: CPT

## 2020-09-11 PROCEDURE — 97112 NEUROMUSCULAR REEDUCATION: CPT

## 2020-09-11 NOTE — PROGRESS NOTES
Daily Note     Today's date: 2020  Patient name: Margarito Fish  : 1965  MRN: [id number]  Referring provider: Alen Luis, PT  Dx:   Encounter Diagnosis     ICD-10-CM    1  Chronic midline low back pain without sciatica  M54 5     G89 29                   Subjective: Pt reports today the low back is feeling "very good"  He states he always feels good following therapy  Objective: See treatment diary below      Assessment: Lowella Boeck tolerated treatment well  Pt appropriately challenged with current exercise POC with fatigue evident  No increase in lumbar discomfort throughout session  Patient would benefit from continued PT, progress as able  Plan: Continue per plan of care        Precautions: HTN      Manuals      Sacral PA Sacral rocking 6' Gr III  Sacral rocking 6' Gr III Sacral rocking 6' Gr III Sacral rocking 6' Gr III Sacral rocking 6' Gr III Sacral rocking 6' Gr III Sacral rocking 6' Gr III  SY     Hip extension mobilizations             Sciatic N glides             Hip IR mobilization in prone Gr III 3' B  DC R III 2' R III 2'        Mid thoracic mobilizations Gr V  Gr V Gv  Gr V Gr V Gr V Gr V SY     Neuro Re-Ed             Plank walks out with LE extension     Cueing need 5x on each blue PB 10x on each with PB 10x on each with PB 10x on each with PB     Pain neuroscience education              TrA in hooklying             TrA March             TrA bridge              TrA SLS             TrA SLR             Prone LE lifts with TrA             Multifidus press with TB             Dead bugs progression  Perform with longer holds nv  nv          Active 90/90 N glides             PB forward planks   10x5" 10x10"          Quad UE/LE lifts  Perform with longer holds nv 20x5" each 10x on each with GTB   10x on each with GTB  2x10 on each with GTB 2x10 on each with GTB 2x10 on each with GTB     Xwalks with TrA             Side bridge             Dead lifts with DB             Ab roller with PB             Cat             Chops @ [place]  10x 7 5 # B/L  nv nv 10x 10lbs  10x 10lbs        R   Chops @ [place]  10x 5# B/L  nv nv [id number] 10x 10lbs        Shoulder press with TrA- progress to SLS nv nv 2x10 B/L with 10# DBs          Slump stretch against wall   nv 10x10"  10x10"         Side planks with shoulder abduction    ykb 10x ea ykb 10x on each        Hip thrusts      On PB 10x10"       Ther Ex             Bicycle              Hip hinge - slow progression             LAT pull down with TrA 2x10 with SLS on each LE- 20 lbs            [address] 5'  5' 5' 5' 5' 5' 5'     Hamstring stretch      4x30" 4x30" 4x30"     Pelvic Rocking into flexion in quadruped 10x5"  10x5"  10x5" 10x5" 10x5" 10x5"     Sidelying UTR "open book" stretch 5" x10 ea            Mid thoracic extension over half foam roller   2x30"          Ther Activity             Sit to stand squats with KB             Reverse lunge with Valslide             Floor to half kneeling-to standing transfers             [place] march in pelvic neutral and TrA 10x            Back squats   nv nv 3x10 30,40lbs 3x10 40lbs 3x10 40lbs 3x10 40lbs     Gait Training                                       Modalities

## 2020-09-18 ENCOUNTER — OFFICE VISIT (OUTPATIENT)
Dept: PHYSICAL THERAPY | Facility: CLINIC | Age: 55
End: 2020-09-18
Payer: COMMERCIAL

## 2020-09-18 DIAGNOSIS — M54.50 CHRONIC MIDLINE LOW BACK PAIN WITHOUT SCIATICA: Primary | ICD-10-CM

## 2020-09-18 DIAGNOSIS — G89.29 CHRONIC MIDLINE LOW BACK PAIN WITHOUT SCIATICA: Primary | ICD-10-CM

## 2020-09-18 PROCEDURE — 97112 NEUROMUSCULAR REEDUCATION: CPT | Performed by: PHYSICAL THERAPIST

## 2020-09-18 PROCEDURE — 97140 MANUAL THERAPY 1/> REGIONS: CPT | Performed by: PHYSICAL THERAPIST

## 2020-09-18 PROCEDURE — 97530 THERAPEUTIC ACTIVITIES: CPT | Performed by: PHYSICAL THERAPIST

## 2020-09-18 NOTE — PROGRESS NOTES
Daily Note     Today's date: 2020  Patient name: Luke Pyle  : 1965  MRN: [id number]  Referring provider: Guillermo Shepherd, PT  Dx:   Encounter Diagnosis     ICD-10-CM    1  Chronic midline low back pain without sciatica  M54 5     G89 29        Start Time: 730  Stop Time: 0815  Total time in clinic (min): 45 minutes    Subjective: Pt notes progressive improvement overall pain level  He notes minimal to no pain while playing with son, transfers, and getting down/up from the floor  He is very pleased with his progress  Objective: See treatment diary below      Assessment: Continued lumbar spine loading exercises with excellent tolerance  Initiated RDL with two 15lb dumbbells  Pt requires tactile cueing for correct performance  However, overall treatment tolerated  well  Patient demonstrated fatigue post treatment and would benefit from continued PT  Plan: Continue per plan of care  Precautions: HTN      Manuals     Sacral PA Sacral rocking 6' Gr III  Sacral rocking 6' Gr III Sacral rocking 6' Gr III Sacral rocking 6' Gr III Sacral rocking 6' Gr III Sacral rocking 6' Gr III Sacral rocking 6' Gr III  SY Sacral rocking 6' Gr III    Hip extension mobilizations             Sciatic N glides             Hip IR mobilization in prone Gr III 3' B  DC R III 2' R III 2'        Mid thoracic mobilizations Gr V  Gr V Gv  Gr V Gr V Gr V Gr V SY Gr V    Neuro Re-Ed             Plank walks out with LE extension     Cueing need 5x on each blue PB 10x on each with PB 10x on each with PB 10x on each with PB 10x on each with PB    BOSU planks with MB rolls         5x10"    Quad UE/LE lifts  Perform with longer holds nv 20x5" each 10x on each with GTB   10x on each with GTB  2x10 on each with GTB 2x10 on each with GTB 2x10 on each with GTB     Chops @ [place]  10x 7 5 # B/L  nv nv 10x 10lbs  10x 10lbs        R   Chops @ [place]  10x 5# B/L  nv nv [id number] 10x 10lbs Shoulder press with TrA- progress to SLS nv nv 2x10 B/L with 10# DBs          Slump stretch against wall   nv 10x10"  10x10"         Side planks with shoulder abduction    ykb 10x ea ykb 10x on each        Hip thrusts      On PB 10x10"   On PB 10x10", 10x with 25lb    Ther Ex             [address] way 5'  5' 5' 5' 5' 5' 5' 5'    Hamstring stretch      4x30" 4x30" 4x30" 4x30" standing    Pelvic Rocking into flexion in quadruped 10x5"  10x5"  10x5" 10x5" 10x5" 10x5"     Ther Activity             Back squats   nv nv 3x10 30,40lbs 3x10 40lbs 3x10 40lbs 3x10 40lbs 3x10 40lbs    RDL's at 50%          30lbs plinth limiting motion 2x10     Gait Training                                       Modalities

## 2020-09-25 ENCOUNTER — OFFICE VISIT (OUTPATIENT)
Dept: PHYSICAL THERAPY | Facility: CLINIC | Age: 55
End: 2020-09-25
Payer: COMMERCIAL

## 2020-09-25 DIAGNOSIS — G89.29 CHRONIC MIDLINE LOW BACK PAIN WITHOUT SCIATICA: Primary | ICD-10-CM

## 2020-09-25 DIAGNOSIS — M54.50 CHRONIC MIDLINE LOW BACK PAIN WITHOUT SCIATICA: Primary | ICD-10-CM

## 2020-09-25 PROCEDURE — 97530 THERAPEUTIC ACTIVITIES: CPT | Performed by: PHYSICAL THERAPIST

## 2020-09-25 PROCEDURE — 97140 MANUAL THERAPY 1/> REGIONS: CPT | Performed by: PHYSICAL THERAPIST

## 2020-09-25 PROCEDURE — 97110 THERAPEUTIC EXERCISES: CPT | Performed by: PHYSICAL THERAPIST

## 2020-09-25 NOTE — PROGRESS NOTES
Daily Note     Today's date: 2020  Patient name: Hailey Talley  : 1965  MRN: [id number]  Referring provider: Kan Arceo, LAM  Dx:   Encounter Diagnosis     ICD-10-CM    1  Chronic midline low back pain without sciatica  M54 5     G89 29        Start Time: 0900  Stop Time: 0945  Total time in clinic (min): 45 minutes    Subjective: No new complaints with pain as per patient; however, patient only sleeping on average 2 hours per night  Objective: See treatment diary below      Assessment: Performed KB OH hold with walking to increase spinal extensor endurance  Pt is doing extremely well with minimal LBP at this time  Tolerated treatment well  Patient would benefit from continued PT  Plan: Continue per plan of care  Precautions: HTN      Manuals    Sacral PA Sacral rocking 6' Gr III  Sacral rocking 6' Gr III Sacral rocking 6' Gr III Sacral rocking 6' Gr III Sacral rocking 6' Gr III Sacral rocking 6' Gr III Sacral rocking 6' Gr III  SY Sacral rocking 6' Gr III Sacral rocking 6' Gr III   Hip extension mobilizations             Sciatic N glides             Hip IR mobilization in prone Gr III 3' B  DC R III 2' R III 2'        Mid thoracic mobilizations Gr V  Gr V Gv  Gr V Gr V Gr V Gr V SY Gr V Gr V   Neuro Re-Ed             Plank walks out with LE extension     Cueing need 5x on each blue PB 10x on each with PB 10x on each with PB 10x on each with PB 10x on each with PB 10x on each with PB   BOSU planks with MB rolls         5x10" 5x10" with tennis ball   Quad UE/LE lifts  Perform with longer holds nv 20x5" each 10x on each with GTB   10x on each with GTB  2x10 on each with GTB 2x10 on each with GTB 2x10 on each with GTB     Chops @ [place]  10x 7 5 # B/L  nv nv 10x 10lbs  10x 10lbs        R   Chops @ [place]  10x 5# B/L  nv nv [id number] 10x 10lbs        Shoulder press with TrA- progress to SLS nv nv 2x10 B/L with 10# DBs          Slump stretch against wall   nv 10x10"  10x10"         Side planks with shoulder abduction    ykb 10x ea ykb 10x on each        Hip thrusts      On PB 10x10"   On PB 10x10", 10x with 25lb On PB 10x10", 10x with 25lb   Ther Ex             Wood way 5'  5' 5' 5' 5' 5' 5' 5' 5'   Hamstring stretch      4x30" 4x30" 4x30" 4x30" standing 4x30" standing   Pelvic Rocking into flexion in quadruped 10x5"  10x5"  10x5" 10x5" 10x5" 10x5"  10x10"   Ther Activity             Back squats   nv nv 3x10 30,40lbs 3x10 40lbs 3x10 40lbs 3x10 40lbs 3x10 40lbs 3x10 40lbs   RDL's at 50%          30lbs plinth limiting motion 2x10  30lbs plinth limiting motion 2x10    KB OH hold with walking          15#    MB throws in squat position          2x10 16lbs   Gait Training                                       Modalities

## 2020-10-02 ENCOUNTER — OFFICE VISIT (OUTPATIENT)
Dept: PHYSICAL THERAPY | Facility: CLINIC | Age: 55
End: 2020-10-02
Payer: COMMERCIAL

## 2020-10-02 DIAGNOSIS — M54.50 CHRONIC MIDLINE LOW BACK PAIN WITHOUT SCIATICA: Primary | ICD-10-CM

## 2020-10-02 DIAGNOSIS — G89.29 CHRONIC MIDLINE LOW BACK PAIN WITHOUT SCIATICA: Primary | ICD-10-CM

## 2020-10-02 PROCEDURE — 97110 THERAPEUTIC EXERCISES: CPT | Performed by: PHYSICAL THERAPIST

## 2020-10-02 PROCEDURE — 97140 MANUAL THERAPY 1/> REGIONS: CPT | Performed by: PHYSICAL THERAPIST

## 2020-10-02 PROCEDURE — 97530 THERAPEUTIC ACTIVITIES: CPT | Performed by: PHYSICAL THERAPIST

## 2020-10-02 PROCEDURE — 97112 NEUROMUSCULAR REEDUCATION: CPT | Performed by: PHYSICAL THERAPIST

## 2020-10-09 ENCOUNTER — EVALUATION (OUTPATIENT)
Dept: PHYSICAL THERAPY | Facility: CLINIC | Age: 55
End: 2020-10-09
Payer: COMMERCIAL

## 2020-10-09 DIAGNOSIS — M54.50 CHRONIC MIDLINE LOW BACK PAIN WITHOUT SCIATICA: Primary | ICD-10-CM

## 2020-10-09 DIAGNOSIS — G89.29 CHRONIC MIDLINE LOW BACK PAIN WITHOUT SCIATICA: Primary | ICD-10-CM

## 2020-10-09 PROCEDURE — 97110 THERAPEUTIC EXERCISES: CPT | Performed by: PHYSICAL THERAPIST

## 2020-10-09 PROCEDURE — 97140 MANUAL THERAPY 1/> REGIONS: CPT | Performed by: PHYSICAL THERAPIST
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
## 2020-10-26 ENCOUNTER — TELEPHONE (OUTPATIENT)
Dept: PULMONOLOGY | Facility: CLINIC | Age: 55
End: 2020-10-26

## 2020-10-27 ENCOUNTER — CONSULT (OUTPATIENT)
Dept: PULMONOLOGY | Facility: CLINIC | Age: 55
End: 2020-10-27
Payer: COMMERCIAL

## 2020-10-27 VITALS
HEIGHT: 67 IN | TEMPERATURE: 97.7 F | WEIGHT: 201 LBS | OXYGEN SATURATION: 97 % | DIASTOLIC BLOOD PRESSURE: 94 MMHG | BODY MASS INDEX: 31.55 KG/M2 | SYSTOLIC BLOOD PRESSURE: 146 MMHG | HEART RATE: 62 BPM

## 2020-10-27 DIAGNOSIS — R05.9 COUGH: ICD-10-CM

## 2020-10-27 DIAGNOSIS — G47.9 SLEEP DISTURBANCE: ICD-10-CM

## 2020-10-27 DIAGNOSIS — G47.00 INSOMNIA, UNSPECIFIED TYPE: Primary | ICD-10-CM

## 2020-10-27 PROCEDURE — 99214 OFFICE O/P EST MOD 30 MIN: CPT | Performed by: PHYSICIAN ASSISTANT

## 2020-10-31 ENCOUNTER — APPOINTMENT (EMERGENCY)
Dept: CT IMAGING | Facility: HOSPITAL | Age: 55
End: 2020-10-31
Payer: COMMERCIAL

## 2020-10-31 ENCOUNTER — HOSPITAL ENCOUNTER (EMERGENCY)
Facility: HOSPITAL | Age: 55
Discharge: HOME | End: 2020-10-31
Attending: EMERGENCY MEDICINE | Admitting: EMERGENCY MEDICINE
Payer: COMMERCIAL

## 2020-10-31 VITALS
DIASTOLIC BLOOD PRESSURE: 85 MMHG | BODY MASS INDEX: 31.56 KG/M2 | SYSTOLIC BLOOD PRESSURE: 132 MMHG | TEMPERATURE: 98.5 F | WEIGHT: 201.06 LBS | OXYGEN SATURATION: 91 % | RESPIRATION RATE: 20 BRPM | HEART RATE: 53 BPM | HEIGHT: 67 IN

## 2020-10-31 DIAGNOSIS — N40.0 ENLARGED PROSTATE: ICD-10-CM

## 2020-10-31 DIAGNOSIS — R10.31 ACUTE RIGHT LOWER QUADRANT PAIN: Primary | ICD-10-CM

## 2020-10-31 DIAGNOSIS — N20.1 RIGHT URETERAL STONE: ICD-10-CM

## 2020-10-31 LAB
ALBUMIN SERPL BCP-MCNC: 3.7 G/DL (ref 3.5–5)
ALP SERPL-CCNC: 144 U/L (ref 46–116)
ALT SERPL W P-5'-P-CCNC: 25 U/L (ref 12–78)
ANION GAP SERPL CALCULATED.3IONS-SCNC: 8 MMOL/L (ref 4–13)
AST SERPL W P-5'-P-CCNC: 28 U/L (ref 5–45)
BACTERIA UR QL AUTO: ABNORMAL /HPF
BASOPHILS # BLD AUTO: 0.04 THOUSANDS/ΜL (ref 0–0.1)
BASOPHILS NFR BLD AUTO: 1 % (ref 0–1)
BILIRUB DIRECT SERPL-MCNC: 0.11 MG/DL (ref 0–0.2)
BILIRUB SERPL-MCNC: 0.4 MG/DL (ref 0.2–1)
BILIRUB UR QL STRIP: NEGATIVE
BUN SERPL-MCNC: 19 MG/DL (ref 5–25)
CALCIUM SERPL-MCNC: 8.4 MG/DL (ref 8.3–10.1)
CHLORIDE SERPL-SCNC: 107 MMOL/L (ref 100–108)
CLARITY UR: CLEAR
CO2 SERPL-SCNC: 28 MMOL/L (ref 21–32)
COLOR UR: YELLOW
CREAT SERPL-MCNC: 1.27 MG/DL (ref 0.6–1.3)
EOSINOPHIL # BLD AUTO: 0.27 THOUSAND/ΜL (ref 0–0.61)
EOSINOPHIL NFR BLD AUTO: 4 % (ref 0–6)
ERYTHROCYTE [DISTWIDTH] IN BLOOD BY AUTOMATED COUNT: 13.1 % (ref 11.6–15.1)
GFR SERPL CREATININE-BSD FRML MDRD: 63 ML/MIN/1.73SQ M
GLUCOSE SERPL-MCNC: 99 MG/DL (ref 65–140)
GLUCOSE UR STRIP-MCNC: NEGATIVE MG/DL
HCT VFR BLD AUTO: 46.4 % (ref 36.5–49.3)
HGB BLD-MCNC: 15.8 G/DL (ref 12–17)
HGB UR QL STRIP.AUTO: ABNORMAL
IMM GRANULOCYTES # BLD AUTO: 0.01 THOUSAND/UL (ref 0–0.2)
IMM GRANULOCYTES NFR BLD AUTO: 0 % (ref 0–2)
KETONES UR STRIP-MCNC: NEGATIVE MG/DL
LEUKOCYTE ESTERASE UR QL STRIP: NEGATIVE
LIPASE SERPL-CCNC: 780 U/L (ref 73–393)
LYMPHOCYTES # BLD AUTO: 1.4 THOUSANDS/ΜL (ref 0.6–4.47)
LYMPHOCYTES NFR BLD AUTO: 23 % (ref 14–44)
MCH RBC QN AUTO: 30.7 PG (ref 26.8–34.3)
MCHC RBC AUTO-ENTMCNC: 34.1 G/DL (ref 31.4–37.4)
MCV RBC AUTO: 90 FL (ref 82–98)
MONOCYTES # BLD AUTO: 0.76 THOUSAND/ΜL (ref 0.17–1.22)
MONOCYTES NFR BLD AUTO: 12 % (ref 4–12)
NEUTROPHILS # BLD AUTO: 3.75 THOUSANDS/ΜL (ref 1.85–7.62)
NEUTS SEG NFR BLD AUTO: 60 % (ref 43–75)
NITRITE UR QL STRIP: NEGATIVE
NON-SQ EPI CELLS URNS QL MICRO: ABNORMAL /HPF
NRBC BLD AUTO-RTO: 0 /100 WBCS
PH UR STRIP.AUTO: 6.5 [PH]
PLATELET # BLD AUTO: 186 THOUSANDS/UL (ref 149–390)
PMV BLD AUTO: 10.6 FL (ref 8.9–12.7)
POTASSIUM SERPL-SCNC: 3.1 MMOL/L (ref 3.5–5.3)
PROT SERPL-MCNC: 7.5 G/DL (ref 6.4–8.2)
PROT UR STRIP-MCNC: NEGATIVE MG/DL
RBC # BLD AUTO: 5.14 MILLION/UL (ref 3.88–5.62)
RBC #/AREA URNS AUTO: ABNORMAL /HPF
SODIUM SERPL-SCNC: 143 MMOL/L (ref 136–145)
SP GR UR STRIP.AUTO: 1.02 (ref 1–1.03)
UROBILINOGEN UR QL STRIP.AUTO: 0.2 E.U./DL
WBC # BLD AUTO: 6.23 THOUSAND/UL (ref 4.31–10.16)
WBC #/AREA URNS AUTO: ABNORMAL /HPF

## 2020-10-31 PROCEDURE — 85025 COMPLETE CBC W/AUTO DIFF WBC: CPT | Performed by: EMERGENCY MEDICINE

## 2020-10-31 PROCEDURE — 81001 URINALYSIS AUTO W/SCOPE: CPT | Performed by: EMERGENCY MEDICINE

## 2020-10-31 PROCEDURE — 83690 ASSAY OF LIPASE: CPT | Performed by: EMERGENCY MEDICINE

## 2020-10-31 PROCEDURE — 99284 EMERGENCY DEPT VISIT MOD MDM: CPT

## 2020-10-31 PROCEDURE — 96361 HYDRATE IV INFUSION ADD-ON: CPT

## 2020-10-31 PROCEDURE — G1004 CDSM NDSC: HCPCS

## 2020-10-31 PROCEDURE — 96375 TX/PRO/DX INJ NEW DRUG ADDON: CPT

## 2020-10-31 PROCEDURE — 96374 THER/PROPH/DIAG INJ IV PUSH: CPT

## 2020-10-31 PROCEDURE — 99285 EMERGENCY DEPT VISIT HI MDM: CPT | Performed by: EMERGENCY MEDICINE

## 2020-10-31 PROCEDURE — 74176 CT ABD & PELVIS W/O CONTRAST: CPT

## 2020-10-31 PROCEDURE — 36415 COLL VENOUS BLD VENIPUNCTURE: CPT | Performed by: EMERGENCY MEDICINE

## 2020-10-31 PROCEDURE — 80048 BASIC METABOLIC PNL TOTAL CA: CPT | Performed by: EMERGENCY MEDICINE

## 2020-10-31 PROCEDURE — 80076 HEPATIC FUNCTION PANEL: CPT | Performed by: EMERGENCY MEDICINE

## 2020-10-31 RX ORDER — TAMSULOSIN HYDROCHLORIDE 0.4 MG/1
0.4 CAPSULE ORAL
Qty: 7 CAPSULE | Refills: 0 | Status: SHIPPED | OUTPATIENT
Start: 2020-11-01 | End: 2021-04-14

## 2020-10-31 RX ORDER — ONDANSETRON 2 MG/ML
4 INJECTION INTRAMUSCULAR; INTRAVENOUS ONCE
Status: COMPLETED | OUTPATIENT
Start: 2020-10-31 | End: 2020-10-31

## 2020-10-31 RX ORDER — OXYCODONE HYDROCHLORIDE AND ACETAMINOPHEN 5; 325 MG/1; MG/1
1 TABLET ORAL EVERY 4 HOURS PRN
Qty: 10 TABLET | Refills: 0 | Status: SHIPPED | OUTPATIENT
Start: 2020-10-31 | End: 2020-11-10

## 2020-10-31 RX ORDER — TAMSULOSIN HYDROCHLORIDE 0.4 MG/1
0.4 CAPSULE ORAL ONCE
Status: COMPLETED | OUTPATIENT
Start: 2020-10-31 | End: 2020-10-31

## 2020-10-31 RX ORDER — ONDANSETRON 4 MG/1
4 TABLET, ORALLY DISINTEGRATING ORAL EVERY 8 HOURS PRN
Qty: 12 TABLET | Refills: 0 | Status: SHIPPED | OUTPATIENT
Start: 2020-10-31 | End: 2021-04-14

## 2020-10-31 RX ORDER — HYDROMORPHONE HCL/PF 1 MG/ML
1 SYRINGE (ML) INJECTION ONCE
Status: COMPLETED | OUTPATIENT
Start: 2020-10-31 | End: 2020-10-31

## 2020-10-31 RX ORDER — KETOROLAC TROMETHAMINE 30 MG/ML
15 INJECTION, SOLUTION INTRAMUSCULAR; INTRAVENOUS ONCE
Status: COMPLETED | OUTPATIENT
Start: 2020-10-31 | End: 2020-10-31

## 2020-10-31 RX ORDER — NAPROXEN 500 MG/1
500 TABLET ORAL EVERY 12 HOURS PRN
Qty: 20 TABLET | Refills: 0 | Status: SHIPPED | OUTPATIENT
Start: 2020-10-31 | End: 2021-04-14

## 2020-10-31 RX ORDER — POTASSIUM CHLORIDE 20 MEQ/1
40 TABLET, EXTENDED RELEASE ORAL ONCE
Status: COMPLETED | OUTPATIENT
Start: 2020-10-31 | End: 2020-10-31

## 2020-10-31 RX ADMIN — TAMSULOSIN HYDROCHLORIDE 0.4 MG: 0.4 CAPSULE ORAL at 07:45

## 2020-10-31 RX ADMIN — ONDANSETRON 4 MG: 2 INJECTION INTRAMUSCULAR; INTRAVENOUS at 06:13

## 2020-10-31 RX ADMIN — POTASSIUM CHLORIDE 40 MEQ: 1500 TABLET, EXTENDED RELEASE ORAL at 07:09

## 2020-10-31 RX ADMIN — SODIUM CHLORIDE 1000 ML: 0.9 INJECTION, SOLUTION INTRAVENOUS at 06:14

## 2020-10-31 RX ADMIN — KETOROLAC TROMETHAMINE 15 MG: 30 INJECTION, SOLUTION INTRAMUSCULAR at 06:13

## 2020-10-31 RX ADMIN — HYDROMORPHONE HYDROCHLORIDE 1 MG: 1 INJECTION, SOLUTION INTRAMUSCULAR; INTRAVENOUS; SUBCUTANEOUS at 06:13

## 2020-11-02 ENCOUNTER — TELEPHONE (OUTPATIENT)
Dept: UROLOGY | Facility: MEDICAL CENTER | Age: 55
End: 2020-11-02

## 2020-11-17 DIAGNOSIS — I10 HYPERTENSION, UNSPECIFIED TYPE: ICD-10-CM

## 2020-11-18 RX ORDER — METOPROLOL SUCCINATE 50 MG/1
75 TABLET, EXTENDED RELEASE ORAL DAILY
Qty: 135 TABLET | Refills: 0 | Status: SHIPPED | OUTPATIENT
Start: 2020-11-18 | End: 2021-04-14

## 2020-11-18 RX ORDER — AMLODIPINE BESYLATE 10 MG/1
10 TABLET ORAL DAILY
Qty: 90 TABLET | Refills: 0 | Status: SHIPPED | OUTPATIENT
Start: 2020-11-18 | End: 2021-05-28 | Stop reason: SDUPTHER

## 2020-11-18 RX ORDER — LISINOPRIL 20 MG/1
20 TABLET ORAL DAILY
Qty: 90 TABLET | Refills: 0 | Status: SHIPPED | OUTPATIENT
Start: 2020-11-18 | End: 2021-03-24 | Stop reason: SDUPTHER

## 2020-12-03 ENCOUNTER — HOSPITAL ENCOUNTER (OUTPATIENT)
Dept: PULMONOLOGY | Facility: HOSPITAL | Age: 55
Discharge: HOME | End: 2020-12-03
Payer: COMMERCIAL

## 2020-12-03 DIAGNOSIS — R05.9 COUGH: ICD-10-CM

## 2020-12-03 PROCEDURE — 94727 GAS DIL/WSHOT DETER LNG VOL: CPT | Performed by: INTERNAL MEDICINE

## 2020-12-03 PROCEDURE — 94727 GAS DIL/WSHOT DETER LNG VOL: CPT

## 2020-12-03 PROCEDURE — 94010 BREATHING CAPACITY TEST: CPT | Performed by: INTERNAL MEDICINE

## 2020-12-03 PROCEDURE — 94729 DIFFUSING CAPACITY: CPT

## 2020-12-03 PROCEDURE — 94010 BREATHING CAPACITY TEST: CPT

## 2020-12-03 PROCEDURE — 94729 DIFFUSING CAPACITY: CPT | Performed by: INTERNAL MEDICINE

## 2020-12-03 PROCEDURE — 94760 N-INVAS EAR/PLS OXIMETRY 1: CPT

## 2020-12-31 ENCOUNTER — TELEPHONE (OUTPATIENT)
Dept: PULMONOLOGY | Facility: CLINIC | Age: 55
End: 2020-12-31

## 2020-12-31 DIAGNOSIS — G47.33 OSA (OBSTRUCTIVE SLEEP APNEA): Primary | ICD-10-CM

## 2021-02-10 ENCOUNTER — HOSPITAL ENCOUNTER (OUTPATIENT)
Dept: SLEEP CENTER | Facility: CLINIC | Age: 56
Discharge: HOME | End: 2021-02-10
Payer: COMMERCIAL

## 2021-02-10 DIAGNOSIS — G47.33 OSA (OBSTRUCTIVE SLEEP APNEA): ICD-10-CM

## 2021-02-10 PROCEDURE — G0399 HOME SLEEP TEST/TYPE 3 PORTA: HCPCS

## 2021-02-10 PROCEDURE — G0399 HOME SLEEP TEST/TYPE 3 PORTA: HCPCS | Performed by: INTERNAL MEDICINE

## 2021-02-11 NOTE — PROGRESS NOTES
Home Sleep Study Documentation    Pre-Sleep Home Study:    Set-up and instructions performed by: SHIREEN Angulo    Technician performed demonstration for Patient: yes    Return demonstration performed by Patient: yes    Written instructions provided to Patient: yes    Patient signed consent form: yes        Post-Sleep Home Study:    Additional comments by Patient: none    Home Sleep Study Failed:no:    Failure reason: N/A    Reported or Detected: N/A    Scored by: ASHER Olmos RPSGT

## 2021-02-16 ENCOUNTER — TELEPHONE (OUTPATIENT)
Dept: PULMONOLOGY | Facility: CLINIC | Age: 56
End: 2021-02-16

## 2021-02-16 ENCOUNTER — TELEPHONE (OUTPATIENT)
Dept: SLEEP CENTER | Facility: CLINIC | Age: 56
End: 2021-02-16

## 2021-02-16 NOTE — TELEPHONE ENCOUNTER
Advised patient sleep study results available he will follow up with a phone call to Dr Rogelio starr

## 2021-02-17 DIAGNOSIS — G47.33 OSA (OBSTRUCTIVE SLEEP APNEA): Primary | ICD-10-CM

## 2021-03-24 DIAGNOSIS — I10 HYPERTENSION, UNSPECIFIED TYPE: ICD-10-CM

## 2021-03-24 RX ORDER — METOPROLOL SUCCINATE 50 MG/1
25 TABLET, EXTENDED RELEASE ORAL DAILY
Qty: 45 TABLET | Refills: 0 | Status: SHIPPED | OUTPATIENT
Start: 2021-03-24 | End: 2021-05-28 | Stop reason: SDUPTHER

## 2021-03-24 RX ORDER — AMLODIPINE BESYLATE 10 MG/1
10 TABLET ORAL DAILY
Qty: 90 TABLET | Refills: 0 | Status: SHIPPED | OUTPATIENT
Start: 2021-03-24 | End: 2021-06-09

## 2021-03-24 RX ORDER — LISINOPRIL 20 MG/1
20 TABLET ORAL DAILY
Qty: 90 TABLET | Refills: 0 | Status: SHIPPED | OUTPATIENT
Start: 2021-03-24 | End: 2021-05-28 | Stop reason: SDUPTHER

## 2021-03-29 ENCOUNTER — TELEPHONE (OUTPATIENT)
Dept: PULMONOLOGY | Facility: CLINIC | Age: 56
End: 2021-03-29

## 2021-03-29 NOTE — TELEPHONE ENCOUNTER
Looks like Dr Rochelle Potts ordered the Auto-CPAP on 2/17  Can you check to see if they received the order? Thanks

## 2021-03-29 NOTE — TELEPHONE ENCOUNTER
Patient is stating he never received a phone call from [facility] regarding his cpap  Can you look into this for him? Thank you

## 2021-03-30 ENCOUNTER — TELEPHONE (OUTPATIENT)
Dept: PULMONOLOGY | Facility: CLINIC | Age: 56
End: 2021-03-30

## 2021-04-08 DIAGNOSIS — Z23 ENCOUNTER FOR IMMUNIZATION: ICD-10-CM

## 2021-04-14 ENCOUNTER — OFFICE VISIT (OUTPATIENT)
Dept: FAMILY MEDICINE CLINIC | Facility: CLINIC | Age: 56
End: 2021-04-14
Payer: COMMERCIAL

## 2021-04-14 VITALS
HEART RATE: 55 BPM | WEIGHT: 188 LBS | OXYGEN SATURATION: 96 % | SYSTOLIC BLOOD PRESSURE: 136 MMHG | TEMPERATURE: 98.3 F | HEIGHT: 67 IN | DIASTOLIC BLOOD PRESSURE: 84 MMHG | BODY MASS INDEX: 29.51 KG/M2

## 2021-04-14 DIAGNOSIS — G47.33 OSA (OBSTRUCTIVE SLEEP APNEA): ICD-10-CM

## 2021-04-14 DIAGNOSIS — J98.4 RESTRICTIVE LUNG DISEASE: ICD-10-CM

## 2021-04-14 DIAGNOSIS — I10 BENIGN ESSENTIAL HTN: ICD-10-CM

## 2021-04-14 DIAGNOSIS — Z12.5 SCREENING FOR PROSTATE CANCER: Primary | ICD-10-CM

## 2021-04-14 PROCEDURE — 99214 OFFICE O/P EST MOD 30 MIN: CPT | Performed by: INTERNAL MEDICINE
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
## 2021-04-30 ENCOUNTER — TELEPHONE (OUTPATIENT)
Dept: PAIN MEDICINE | Facility: CLINIC | Age: 56
End: 2021-04-30

## 2021-04-30 NOTE — TELEPHONE ENCOUNTER
Patient   [phone]  Dr Priyank Reyes     Pt called in wanting to schedule another procedure   I wasn't;t able to finish the call due to call dropping

## 2021-05-12 NOTE — TELEPHONE ENCOUNTER
RN s/w pt regarding previous  Per pt his pain in his low back has been back for the last 3 weeks now at an 8/10  Pt had a Left L3-5 RFA on 3/20 and a Rt L3-5 RFA on 7/20  Pt also is having new Left hip pain  Pt would like to know if Aysha Chaudhary thinks he should be seen at [facility] first or see his ortho doctor for the new pain in his left hip  Pt has completely new insurance since last visit to [facility] and wonders if that would make a difference on how soon he could have his RFA repeated?     --please advise thank you--

## 2021-05-12 NOTE — TELEPHONE ENCOUNTER
RN s/w pt and advised of same  Pt scheduled for an OV with JE on 5/24 with 8 am arrival  Pt appreciative

## 2021-05-13 NOTE — TELEPHONE ENCOUNTER
RN s/w pt  Pt requested to be called if a sooner appt became available with ATTILA  RN resceduled pt for 5/14 with a 7:45 arrival time  Pt appreciative

## 2021-05-14 ENCOUNTER — OFFICE VISIT (OUTPATIENT)
Dept: PAIN MEDICINE | Facility: MEDICAL CENTER | Age: 56
End: 2021-05-14
Payer: COMMERCIAL

## 2021-05-14 ENCOUNTER — TELEPHONE (OUTPATIENT)
Dept: RADIOLOGY | Facility: CLINIC | Age: 56
End: 2021-05-14

## 2021-05-14 VITALS
SYSTOLIC BLOOD PRESSURE: 149 MMHG | HEART RATE: 58 BPM | DIASTOLIC BLOOD PRESSURE: 95 MMHG | HEIGHT: 67 IN | BODY MASS INDEX: 29.19 KG/M2 | TEMPERATURE: 99.2 F | WEIGHT: 186 LBS

## 2021-05-14 DIAGNOSIS — M47.816 LUMBAR SPONDYLOSIS: ICD-10-CM

## 2021-05-14 DIAGNOSIS — M25.552 LEFT HIP PAIN: Primary | ICD-10-CM

## 2021-05-14 PROCEDURE — 99214 OFFICE O/P EST MOD 30 MIN: CPT | Performed by: PHYSICAL MEDICINE & REHABILITATION

## 2021-05-14 NOTE — TELEPHONE ENCOUNTER
LMOM for pt to reschedule his procedure on Tues 06/08 bc schedule is now blocked for SCS trial at that time  Gave pt my direct line and sent message to [facility] with that info
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
## 2021-05-15 ENCOUNTER — HOSPITAL ENCOUNTER (OUTPATIENT)
Dept: RADIOLOGY | Facility: HOSPITAL | Age: 56
Discharge: HOME | End: 2021-05-15
Payer: COMMERCIAL

## 2021-05-15 ENCOUNTER — APPOINTMENT (OUTPATIENT)
Dept: LAB | Facility: HOSPITAL | Age: 56
End: 2021-05-15
Payer: COMMERCIAL

## 2021-05-15 ENCOUNTER — TRANSCRIBE ORDERS (OUTPATIENT)
Dept: ADMINISTRATIVE | Facility: HOSPITAL | Age: 56
End: 2021-05-15

## 2021-05-15 DIAGNOSIS — Z00.8 HEALTH EXAMINATION IN POPULATION SURVEY: Primary | ICD-10-CM

## 2021-05-15 DIAGNOSIS — Z00.8 HEALTH EXAMINATION IN POPULATION SURVEY: ICD-10-CM

## 2021-05-15 DIAGNOSIS — M25.552 LEFT HIP PAIN: ICD-10-CM

## 2021-05-15 LAB
CHOLEST SERPL-MCNC: 175 MG/DL (ref 50–200)
EST. AVERAGE GLUCOSE BLD GHB EST-MCNC: 100 MG/DL
HBA1C MFR BLD: 5.1 %
HDLC SERPL-MCNC: 37 MG/DL
LDLC SERPL CALC-MCNC: 90 MG/DL (ref 0–100)
NONHDLC SERPL-MCNC: 138 MG/DL
TRIGL SERPL-MCNC: 239 MG/DL

## 2021-05-15 PROCEDURE — 80061 LIPID PANEL: CPT

## 2021-05-15 PROCEDURE — 83036 HEMOGLOBIN GLYCOSYLATED A1C: CPT

## 2021-05-15 PROCEDURE — 73502 X-RAY EXAM HIP UNI 2-3 VIEWS: CPT

## 2021-05-15 PROCEDURE — 36415 COLL VENOUS BLD VENIPUNCTURE: CPT

## 2021-05-20 ENCOUNTER — TELEPHONE (OUTPATIENT)
Dept: RADIOLOGY | Facility: MEDICAL CENTER | Age: 56
End: 2021-05-20

## 2021-05-20 NOTE — TELEPHONE ENCOUNTER
Patient scheduled for repeat bilateral L3-5 MBB on 6/9  He has both [employer] and [address]  I submitted to both insurances along with clinicals  Rec'd call from Aranza Lentz at [facility] stating that MBB was being "denied" as repeat MBB is not needed with successful RFA  Prior RFA needs to be at least 6 months of documented 50% or more relief  [facility] medical policy states: If there was a prior successful RF denervation (i e , at least 50 percent reduction in pain), a minimum time of 5 months has elapsed since the prior RF treatment (per side, per anatomical level of the spine) and the individuals functional status, secondary to the return of pain, seriously impedes their ability to work or to perform their activities of daily living  We would be able to repeat RFA with MBB as long as documentation shows at least 6 months relief of 50% or greater of relief

## 2021-05-21 NOTE — TELEPHONE ENCOUNTER
Called patient:     cx MBB on 6/9    Scheduled:     Left L3-5 RFA on 6/9  Right L3-5 RFA on 6/23    Reviewed instructions: , NPO 1 hour prior, loose-fitting/comfortable clothing, if ill/fever/infx/abx to call and reschedule  No need to hold any meds prior  Patient stated verbal understanding

## 2021-05-24 ENCOUNTER — TELEPHONE (OUTPATIENT)
Dept: PAIN MEDICINE | Facility: MEDICAL CENTER | Age: 56
End: 2021-05-24

## 2021-05-24 NOTE — TELEPHONE ENCOUNTER
----- Message from Lonnie Herrera DO sent at 5/24/2021  1:57 PM EDT -----  No significant hip degenerative changes

## 2021-05-24 NOTE — TELEPHONE ENCOUNTER
Would still recommend a diagnostic hip injection  If this provides relief and pain comes back may consider MRI with arthrogram     Please schedule for hip injection under fluoro

## 2021-05-26 NOTE — TELEPHONE ENCOUNTER
Angella Kimbrough from [place] called regarding Dahiana Landa for codes 01247 and 00873      Nila Banerjee approved #[id number] 5/21/2021-11/17/2021  Entered into referral as well

## 2021-05-26 NOTE — TELEPHONE ENCOUNTER
Called and spoke with patient, he is scheduled for RFAs 6/9 and 6/23, has to atleast have 10 days after RFA to schedule LT HIP INJ, scheduled him for 7/6     Reviewed instructions: , NPO 1 hour prior, loose-fitting/comfortable pants, if ill/fever/infx/abx to call and reschedule  No immunizations or vaccinations 2w prior/after steroid injections  Patient stated verbal understanding

## 2021-05-28 DIAGNOSIS — I10 HYPERTENSION, UNSPECIFIED TYPE: ICD-10-CM

## 2021-05-28 RX ORDER — LISINOPRIL 20 MG/1
20 TABLET ORAL DAILY
Qty: 90 TABLET | Refills: 0 | Status: SHIPPED | OUTPATIENT
Start: 2021-05-28 | End: 2021-11-29 | Stop reason: SDUPTHER

## 2021-05-28 RX ORDER — AMLODIPINE BESYLATE 10 MG/1
10 TABLET ORAL DAILY
Qty: 90 TABLET | Refills: 0 | Status: SHIPPED | OUTPATIENT
Start: 2021-05-28 | End: 2021-11-29 | Stop reason: SDUPTHER

## 2021-05-28 RX ORDER — METOPROLOL SUCCINATE 50 MG/1
25 TABLET, EXTENDED RELEASE ORAL DAILY
Qty: 45 TABLET | Refills: 0 | Status: SHIPPED | OUTPATIENT
Start: 2021-05-28 | End: 2021-11-29 | Stop reason: SDUPTHER

## 2021-06-09 ENCOUNTER — HOSPITAL ENCOUNTER (OUTPATIENT)
Dept: RADIOLOGY | Facility: MEDICAL CENTER | Age: 56
Discharge: HOME | End: 2021-06-09
Attending: PHYSICAL MEDICINE & REHABILITATION | Admitting: PHYSICAL MEDICINE & REHABILITATION
Payer: COMMERCIAL

## 2021-06-09 ENCOUNTER — TELEPHONE (OUTPATIENT)
Dept: PAIN MEDICINE | Facility: MEDICAL CENTER | Age: 56
End: 2021-06-09

## 2021-06-09 VITALS
TEMPERATURE: 97.9 F | SYSTOLIC BLOOD PRESSURE: 141 MMHG | OXYGEN SATURATION: 96 % | RESPIRATION RATE: 14 BRPM | HEART RATE: 55 BPM | DIASTOLIC BLOOD PRESSURE: 89 MMHG

## 2021-06-09 DIAGNOSIS — M47.816 LUMBAR SPONDYLOSIS: ICD-10-CM

## 2021-06-09 PROCEDURE — 64635 DESTROY LUMB/SAC FACET JNT: CPT | Performed by: PHYSICAL MEDICINE & REHABILITATION

## 2021-06-09 PROCEDURE — 64636 DESTROY L/S FACET JNT ADDL: CPT | Performed by: PHYSICAL MEDICINE & REHABILITATION

## 2021-06-09 RX ORDER — BUPIVACAINE HCL/PF 2.5 MG/ML
10 VIAL (ML) INJECTION ONCE
Status: COMPLETED | OUTPATIENT
Start: 2021-06-09 | End: 2021-06-09

## 2021-06-09 RX ADMIN — Medication 5 ML: at 08:25

## 2021-06-09 RX ADMIN — Medication 5 ML: at 08:31

## 2021-06-09 NOTE — DISCHARGE INSTRUCTIONS
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
## 2021-06-10 NOTE — TELEPHONE ENCOUNTER
--KAILEE--    RN s/w pt regarding previous  Per pt he has no pain at all at present denied sunburn sensation or s/s of infection at injection sites  Aware it takes 2 weeks to notice pain relief and up to 4-6 weeks for full pain relief to be obtained  Confirmed next OPRO and aware to medicate as previous until pain relief obtained from RFA

## 2021-06-23 ENCOUNTER — HOSPITAL ENCOUNTER (OUTPATIENT)
Dept: RADIOLOGY | Facility: MEDICAL CENTER | Age: 56
Discharge: HOME | End: 2021-06-23
Attending: PHYSICAL MEDICINE & REHABILITATION | Admitting: PHYSICAL MEDICINE & REHABILITATION
Payer: COMMERCIAL

## 2021-06-23 VITALS
OXYGEN SATURATION: 97 % | DIASTOLIC BLOOD PRESSURE: 92 MMHG | TEMPERATURE: 98.6 F | SYSTOLIC BLOOD PRESSURE: 138 MMHG | HEART RATE: 57 BPM | RESPIRATION RATE: 20 BRPM

## 2021-06-23 DIAGNOSIS — M47.816 LUMBAR SPONDYLOSIS: ICD-10-CM

## 2021-06-23 PROCEDURE — 64635 DESTROY LUMB/SAC FACET JNT: CPT | Performed by: PHYSICAL MEDICINE & REHABILITATION

## 2021-06-23 PROCEDURE — 64636 DESTROY L/S FACET JNT ADDL: CPT | Performed by: PHYSICAL MEDICINE & REHABILITATION

## 2021-06-23 RX ORDER — BUPIVACAINE HCL/PF 2.5 MG/ML
10 VIAL (ML) INJECTION ONCE
Status: COMPLETED | OUTPATIENT
Start: 2021-06-23 | End: 2021-06-23

## 2021-06-23 RX ADMIN — Medication 5 ML: at 08:26
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
## 2021-06-23 NOTE — DISCHARGE INSTRUCTIONS

## 2021-06-24 ENCOUNTER — TELEPHONE (OUTPATIENT)
Dept: PAIN MEDICINE | Facility: MEDICAL CENTER | Age: 56
End: 2021-06-24

## 2021-08-18 ENCOUNTER — OFFICE VISIT (OUTPATIENT)
Dept: FAMILY MEDICINE CLINIC | Facility: CLINIC | Age: 56
End: 2021-08-18
Payer: COMMERCIAL

## 2021-08-18 VITALS
SYSTOLIC BLOOD PRESSURE: 114 MMHG | HEIGHT: 67 IN | OXYGEN SATURATION: 98 % | HEART RATE: 54 BPM | TEMPERATURE: 97.5 F | WEIGHT: 182 LBS | DIASTOLIC BLOOD PRESSURE: 76 MMHG | RESPIRATION RATE: 16 BRPM | BODY MASS INDEX: 28.56 KG/M2

## 2021-08-18 DIAGNOSIS — I10 BENIGN ESSENTIAL HTN: Primary | ICD-10-CM

## 2021-08-18 DIAGNOSIS — G47.33 OSA (OBSTRUCTIVE SLEEP APNEA): ICD-10-CM

## 2021-08-18 DIAGNOSIS — Z11.59 ENCOUNTER FOR HEPATITIS C SCREENING TEST FOR LOW RISK PATIENT: ICD-10-CM

## 2021-08-18 PROCEDURE — 99214 OFFICE O/P EST MOD 30 MIN: CPT | Performed by: INTERNAL MEDICINE
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
## 2021-11-29 DIAGNOSIS — I10 HYPERTENSION, UNSPECIFIED TYPE: ICD-10-CM

## 2021-11-29 RX ORDER — AMLODIPINE BESYLATE 10 MG/1
10 TABLET ORAL DAILY
Qty: 90 TABLET | Refills: 0 | Status: SHIPPED | OUTPATIENT
Start: 2021-11-29 | End: 2022-02-25 | Stop reason: SDUPTHER

## 2021-11-29 RX ORDER — METOPROLOL SUCCINATE 50 MG/1
25 TABLET, EXTENDED RELEASE ORAL DAILY
Qty: 45 TABLET | Refills: 0 | Status: SHIPPED | OUTPATIENT
Start: 2021-11-29 | End: 2022-02-25 | Stop reason: SDUPTHER

## 2021-11-29 RX ORDER — LISINOPRIL 20 MG/1
20 TABLET ORAL DAILY
Qty: 90 TABLET | Refills: 0 | Status: SHIPPED | OUTPATIENT
Start: 2021-11-29 | End: 2022-02-25 | Stop reason: SDUPTHER

## 2022-02-25 ENCOUNTER — OFFICE VISIT (OUTPATIENT)
Dept: FAMILY MEDICINE CLINIC | Facility: CLINIC | Age: 57
End: 2022-02-25
Payer: COMMERCIAL

## 2022-02-25 VITALS
BODY MASS INDEX: 29.19 KG/M2 | RESPIRATION RATE: 16 BRPM | OXYGEN SATURATION: 96 % | SYSTOLIC BLOOD PRESSURE: 130 MMHG | WEIGHT: 186 LBS | HEART RATE: 65 BPM | HEIGHT: 67 IN | DIASTOLIC BLOOD PRESSURE: 82 MMHG | TEMPERATURE: 97.3 F

## 2022-02-25 DIAGNOSIS — E78.5 DYSLIPIDEMIA: ICD-10-CM

## 2022-02-25 DIAGNOSIS — Z12.5 SCREENING FOR PROSTATE CANCER: ICD-10-CM

## 2022-02-25 DIAGNOSIS — I10 HYPERTENSION, UNSPECIFIED TYPE: Primary | ICD-10-CM

## 2022-02-25 DIAGNOSIS — G47.33 OSA (OBSTRUCTIVE SLEEP APNEA): ICD-10-CM

## 2022-02-25 PROCEDURE — 3725F SCREEN DEPRESSION PERFORMED: CPT | Performed by: INTERNAL MEDICINE

## 2022-02-25 PROCEDURE — 99214 OFFICE O/P EST MOD 30 MIN: CPT | Performed by: INTERNAL MEDICINE

## 2022-02-25 PROCEDURE — 3008F BODY MASS INDEX DOCD: CPT | Performed by: INTERNAL MEDICINE

## 2022-02-25 RX ORDER — AMLODIPINE BESYLATE 10 MG/1
10 TABLET ORAL DAILY
Qty: 90 TABLET | Refills: 1 | Status: SHIPPED | OUTPATIENT
Start: 2022-02-25

## 2022-02-25 RX ORDER — LISINOPRIL 20 MG/1
20 TABLET ORAL DAILY
Qty: 90 TABLET | Refills: 1 | Status: SHIPPED | OUTPATIENT
Start: 2022-02-25

## 2022-02-25 RX ORDER — METOPROLOL SUCCINATE 25 MG/1
25 TABLET, EXTENDED RELEASE ORAL DAILY
Qty: 90 TABLET | Refills: 1 | Status: SHIPPED | OUTPATIENT
Start: 2022-02-25
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
## 2022-03-02 ENCOUNTER — OFFICE VISIT (OUTPATIENT)
Dept: PAIN MEDICINE | Facility: MEDICAL CENTER | Age: 57
End: 2022-03-02
Payer: COMMERCIAL

## 2022-03-02 VITALS
TEMPERATURE: 97.5 F | HEART RATE: 65 BPM | HEIGHT: 67 IN | SYSTOLIC BLOOD PRESSURE: 114 MMHG | OXYGEN SATURATION: 97 % | WEIGHT: 187 LBS | BODY MASS INDEX: 29.35 KG/M2 | DIASTOLIC BLOOD PRESSURE: 66 MMHG

## 2022-03-02 DIAGNOSIS — M43.17 SPONDYLOLISTHESIS OF LUMBOSACRAL REGION: ICD-10-CM

## 2022-03-02 DIAGNOSIS — M47.816 LUMBAR SPONDYLOSIS: Primary | ICD-10-CM

## 2022-03-02 DIAGNOSIS — G89.4 CHRONIC PAIN SYNDROME: ICD-10-CM

## 2022-03-02 DIAGNOSIS — M54.50 CHRONIC BILATERAL LOW BACK PAIN WITHOUT SCIATICA: ICD-10-CM

## 2022-03-02 DIAGNOSIS — G89.29 CHRONIC BILATERAL LOW BACK PAIN WITHOUT SCIATICA: ICD-10-CM

## 2022-03-02 PROCEDURE — 99214 OFFICE O/P EST MOD 30 MIN: CPT | Performed by: NURSE PRACTITIONER
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
## 2022-03-16 ENCOUNTER — OFFICE VISIT (OUTPATIENT)
Dept: FAMILY MEDICINE CLINIC | Facility: CLINIC | Age: 57
End: 2022-03-16
Payer: COMMERCIAL

## 2022-03-16 VITALS
HEART RATE: 56 BPM | HEIGHT: 66 IN | SYSTOLIC BLOOD PRESSURE: 112 MMHG | OXYGEN SATURATION: 98 % | RESPIRATION RATE: 16 BRPM | WEIGHT: 185 LBS | BODY MASS INDEX: 29.73 KG/M2 | DIASTOLIC BLOOD PRESSURE: 78 MMHG | TEMPERATURE: 97.1 F

## 2022-03-16 DIAGNOSIS — M54.50 CHRONIC BILATERAL LOW BACK PAIN WITHOUT SCIATICA: ICD-10-CM

## 2022-03-16 DIAGNOSIS — Z13.0 SCREENING FOR DEFICIENCY ANEMIA: ICD-10-CM

## 2022-03-16 DIAGNOSIS — Z00.00 ANNUAL PHYSICAL EXAM: Primary | ICD-10-CM

## 2022-03-16 DIAGNOSIS — Z13.220 SCREENING CHOLESTEROL LEVEL: ICD-10-CM

## 2022-03-16 DIAGNOSIS — G89.29 CHRONIC BILATERAL LOW BACK PAIN WITHOUT SCIATICA: ICD-10-CM

## 2022-03-16 PROCEDURE — 3008F BODY MASS INDEX DOCD: CPT | Performed by: INTERNAL MEDICINE

## 2022-03-16 PROCEDURE — 99396 PREV VISIT EST AGE 40-64: CPT | Performed by: INTERNAL MEDICINE

## 2022-03-16 PROCEDURE — 3725F SCREEN DEPRESSION PERFORMED: CPT | Performed by: INTERNAL MEDICINE

## 2022-03-16 RX ORDER — DULOXETIN HYDROCHLORIDE 30 MG/1
CAPSULE, DELAYED RELEASE ORAL
Qty: 60 CAPSULE | Refills: 1 | Status: SHIPPED | OUTPATIENT
Start: 2022-03-16 | End: 2022-03-17 | Stop reason: SDUPTHER
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
## 2022-03-16 NOTE — PATIENT INSTRUCTIONS

## 2022-03-17 DIAGNOSIS — M54.50 CHRONIC BILATERAL LOW BACK PAIN WITHOUT SCIATICA: ICD-10-CM

## 2022-03-17 DIAGNOSIS — G89.29 CHRONIC BILATERAL LOW BACK PAIN WITHOUT SCIATICA: ICD-10-CM

## 2022-03-17 RX ORDER — DULOXETIN HYDROCHLORIDE 60 MG/1
60 CAPSULE, DELAYED RELEASE ORAL DAILY
Qty: 30 CAPSULE | Refills: 1 | Status: SHIPPED | OUTPATIENT
Start: 2022-03-17 | End: 2022-05-13

## 2022-03-17 RX ORDER — DULOXETIN HYDROCHLORIDE 30 MG/1
CAPSULE, DELAYED RELEASE ORAL
Qty: 15 CAPSULE | Refills: 0 | Status: SHIPPED | OUTPATIENT
Start: 2022-03-17 | End: 2022-04-25 | Stop reason: SDUPTHER

## 2022-04-06 ENCOUNTER — HOSPITAL ENCOUNTER (OUTPATIENT)
Dept: RADIOLOGY | Facility: MEDICAL CENTER | Age: 57
Discharge: HOME | End: 2022-04-06
Attending: PHYSICAL MEDICINE & REHABILITATION | Admitting: PHYSICAL MEDICINE & REHABILITATION
Payer: COMMERCIAL

## 2022-04-06 ENCOUNTER — TELEPHONE (OUTPATIENT)
Dept: PAIN MEDICINE | Facility: MEDICAL CENTER | Age: 57
End: 2022-04-06

## 2022-04-06 VITALS
OXYGEN SATURATION: 94 % | RESPIRATION RATE: 20 BRPM | SYSTOLIC BLOOD PRESSURE: 135 MMHG | HEART RATE: 59 BPM | TEMPERATURE: 98.1 F | DIASTOLIC BLOOD PRESSURE: 88 MMHG

## 2022-04-06 DIAGNOSIS — M54.50 CHRONIC BILATERAL LOW BACK PAIN WITHOUT SCIATICA: ICD-10-CM

## 2022-04-06 DIAGNOSIS — G89.29 CHRONIC BILATERAL LOW BACK PAIN WITHOUT SCIATICA: ICD-10-CM

## 2022-04-06 DIAGNOSIS — M47.816 LUMBAR SPONDYLOSIS: ICD-10-CM

## 2022-04-06 PROCEDURE — 64636 DESTROY L/S FACET JNT ADDL: CPT | Performed by: PHYSICAL MEDICINE & REHABILITATION

## 2022-04-06 PROCEDURE — 64635 DESTROY LUMB/SAC FACET JNT: CPT | Performed by: PHYSICAL MEDICINE & REHABILITATION

## 2022-04-06 RX ORDER — BUPIVACAINE HCL/PF 2.5 MG/ML
5 VIAL (ML) INJECTION ONCE
Status: COMPLETED | OUTPATIENT
Start: 2022-04-06 | End: 2022-04-06

## 2022-04-06 RX ADMIN — Medication 5 ML: at 10:53

## 2022-04-06 NOTE — DISCHARGE INSTRUCTIONS
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
## 2022-04-11 NOTE — TELEPHONE ENCOUNTER
S/W pt  Pt stated needle sites look good, denies S&S of infection, denies fevers, denies soreness and denies sun burn like sensation  Advised pt if he does get pain to take his prescribed or OTC pain medications and/or use ice/heat and that it takes 4 to 6 weeks to see the full effect  Confirmed next appt w/ pt  Pt verbalized understanding

## 2022-04-20 ENCOUNTER — HOSPITAL ENCOUNTER (OUTPATIENT)
Dept: RADIOLOGY | Facility: MEDICAL CENTER | Age: 57
Discharge: HOME | End: 2022-04-20
Attending: PHYSICAL MEDICINE & REHABILITATION | Admitting: PHYSICAL MEDICINE & REHABILITATION
Payer: COMMERCIAL

## 2022-04-20 ENCOUNTER — TELEPHONE (OUTPATIENT)
Dept: PAIN MEDICINE | Facility: MEDICAL CENTER | Age: 57
End: 2022-04-20

## 2022-04-20 VITALS
OXYGEN SATURATION: 98 % | SYSTOLIC BLOOD PRESSURE: 151 MMHG | RESPIRATION RATE: 16 BRPM | DIASTOLIC BLOOD PRESSURE: 97 MMHG | TEMPERATURE: 97.8 F | HEART RATE: 62 BPM

## 2022-04-20 DIAGNOSIS — G89.29 CHRONIC BILATERAL LOW BACK PAIN WITHOUT SCIATICA: ICD-10-CM

## 2022-04-20 DIAGNOSIS — M54.50 CHRONIC BILATERAL LOW BACK PAIN WITHOUT SCIATICA: ICD-10-CM

## 2022-04-20 DIAGNOSIS — M47.816 LUMBAR SPONDYLOSIS: ICD-10-CM

## 2022-04-20 PROCEDURE — 64636 DESTROY L/S FACET JNT ADDL: CPT | Performed by: PHYSICAL MEDICINE & REHABILITATION

## 2022-04-20 PROCEDURE — 64635 DESTROY LUMB/SAC FACET JNT: CPT | Performed by: PHYSICAL MEDICINE & REHABILITATION

## 2022-04-20 RX ORDER — BUPIVACAINE HCL/PF 2.5 MG/ML
5 VIAL (ML) INJECTION ONCE
Status: COMPLETED | OUTPATIENT
Start: 2022-04-20 | End: 2022-04-20

## 2022-04-20 RX ADMIN — Medication 5 ML: at 11:32

## 2022-04-20 RX ADMIN — Medication 5 ML: at 11:31
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
## 2022-04-20 NOTE — DISCHARGE INSTRUCTIONS

## 2022-04-21 NOTE — TELEPHONE ENCOUNTER
S/W pt  Pt stated needle sites look good, denies S&S of infection, denies fevers, back is sore today and denies sun burn like sensation  Pt stated "Everything is fine "  Advised pt if he does get pain to take his prescribed or OTC pain medications and/or use ice/heat and that it takes 4 to 6 weeks to see the full effect  Confirmed next appt w/ pt  Pt verbalized understanding

## 2022-04-25 ENCOUNTER — OFFICE VISIT (OUTPATIENT)
Dept: FAMILY MEDICINE CLINIC | Facility: CLINIC | Age: 57
End: 2022-04-25
Payer: COMMERCIAL

## 2022-04-25 VITALS
OXYGEN SATURATION: 98 % | WEIGHT: 180 LBS | SYSTOLIC BLOOD PRESSURE: 128 MMHG | HEIGHT: 66 IN | BODY MASS INDEX: 28.93 KG/M2 | TEMPERATURE: 97 F | DIASTOLIC BLOOD PRESSURE: 82 MMHG | RESPIRATION RATE: 16 BRPM | HEART RATE: 56 BPM

## 2022-04-25 DIAGNOSIS — G89.29 CHRONIC BILATERAL LOW BACK PAIN WITHOUT SCIATICA: ICD-10-CM

## 2022-04-25 DIAGNOSIS — M54.50 CHRONIC BILATERAL LOW BACK PAIN WITHOUT SCIATICA: ICD-10-CM

## 2022-04-25 PROCEDURE — 3008F BODY MASS INDEX DOCD: CPT | Performed by: INTERNAL MEDICINE

## 2022-04-25 PROCEDURE — 3725F SCREEN DEPRESSION PERFORMED: CPT | Performed by: INTERNAL MEDICINE

## 2022-04-25 PROCEDURE — 99214 OFFICE O/P EST MOD 30 MIN: CPT | Performed by: INTERNAL MEDICINE

## 2022-04-25 RX ORDER — DULOXETIN HYDROCHLORIDE 30 MG/1
CAPSULE, DELAYED RELEASE ORAL
Qty: 30 CAPSULE | Refills: 1 | Status: SHIPPED | OUTPATIENT
Start: 2022-04-25 | End: 2022-06-20
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
## 2022-05-13 DIAGNOSIS — M54.50 CHRONIC BILATERAL LOW BACK PAIN WITHOUT SCIATICA: ICD-10-CM

## 2022-05-13 DIAGNOSIS — G89.29 CHRONIC BILATERAL LOW BACK PAIN WITHOUT SCIATICA: ICD-10-CM

## 2022-05-13 RX ORDER — DULOXETIN HYDROCHLORIDE 60 MG/1
60 CAPSULE, DELAYED RELEASE ORAL DAILY
Qty: 30 CAPSULE | Refills: 1 | Status: SHIPPED | OUTPATIENT
Start: 2022-05-13 | End: 2022-07-18

## 2022-05-23 ENCOUNTER — OFFICE VISIT (OUTPATIENT)
Dept: PAIN MEDICINE | Facility: MEDICAL CENTER | Age: 57
End: 2022-05-23
Payer: COMMERCIAL

## 2022-05-23 VITALS
WEIGHT: 185 LBS | BODY MASS INDEX: 29.73 KG/M2 | HEART RATE: 58 BPM | HEIGHT: 66 IN | DIASTOLIC BLOOD PRESSURE: 84 MMHG | SYSTOLIC BLOOD PRESSURE: 120 MMHG | OXYGEN SATURATION: 95 % | TEMPERATURE: 97.6 F

## 2022-05-23 DIAGNOSIS — M47.816 LUMBAR SPONDYLOSIS: Primary | ICD-10-CM

## 2022-05-23 DIAGNOSIS — M62.830 LUMBAR PARASPINAL MUSCLE SPASM: ICD-10-CM

## 2022-05-23 DIAGNOSIS — G89.29 CHRONIC BILATERAL LOW BACK PAIN WITHOUT SCIATICA: ICD-10-CM

## 2022-05-23 DIAGNOSIS — G89.4 CHRONIC PAIN SYNDROME: ICD-10-CM

## 2022-05-23 DIAGNOSIS — M43.17 SPONDYLOLISTHESIS OF LUMBOSACRAL REGION: ICD-10-CM

## 2022-05-23 DIAGNOSIS — M54.50 CHRONIC BILATERAL LOW BACK PAIN WITHOUT SCIATICA: ICD-10-CM

## 2022-05-23 PROCEDURE — 3008F BODY MASS INDEX DOCD: CPT | Performed by: NURSE PRACTITIONER

## 2022-05-23 PROCEDURE — 99213 OFFICE O/P EST LOW 20 MIN: CPT | Performed by: NURSE PRACTITIONER

## 2022-05-23 RX ORDER — TIZANIDINE 4 MG/1
4 TABLET ORAL 2 TIMES DAILY
Qty: 60 TABLET | Refills: 2 | Status: SHIPPED | OUTPATIENT
Start: 2022-05-23
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
## 2022-06-18 DIAGNOSIS — M54.50 CHRONIC BILATERAL LOW BACK PAIN WITHOUT SCIATICA: ICD-10-CM

## 2022-06-18 DIAGNOSIS — G89.29 CHRONIC BILATERAL LOW BACK PAIN WITHOUT SCIATICA: ICD-10-CM

## 2022-06-20 RX ORDER — DULOXETIN HYDROCHLORIDE 30 MG/1
CAPSULE, DELAYED RELEASE ORAL
Qty: 90 CAPSULE | Refills: 0 | Status: SHIPPED | OUTPATIENT
Start: 2022-06-20

## 2022-07-18 DIAGNOSIS — M54.50 CHRONIC BILATERAL LOW BACK PAIN WITHOUT SCIATICA: ICD-10-CM

## 2022-07-18 DIAGNOSIS — G89.29 CHRONIC BILATERAL LOW BACK PAIN WITHOUT SCIATICA: ICD-10-CM

## 2022-07-18 RX ORDER — DULOXETIN HYDROCHLORIDE 60 MG/1
60 CAPSULE, DELAYED RELEASE ORAL DAILY
Qty: 30 CAPSULE | Refills: 1 | Status: SHIPPED | OUTPATIENT
Start: 2022-07-18 | End: 2022-09-19

## 2022-07-25 NOTE — PROGRESS NOTES
[facility]  Progress Note Sonia Monte 1965, 47 y o  male MRN: [id number]    Unit/Bed#: -01 Encounter: [id number]    Primary Care Provider: Brianda Londono DO   Date and time admitted to hospital: 12/2/2019  3:37 PM  * Recurrent pneumonia  Assessment & Plan  · Pt reports diagnosis of pneumonia that was treated outpatient with levaquin and amoxicillin by PCP in September, since that time continues to have persistent cough and shortness of breath   · Repeat CT on 11/13 without cardiopulmonary disease  · CTA on admission negative for PE, lower lobe bilateral pneumonia noted concern for possible aspiration   · Pt denies any dysphagia symptoms or recent vomiting  · Recent weight loss, rapid HIV panel negative  · Pulmonary following  · Continue IV ceftriaxone with flagyl coverage  · SPEECH recommending regular diet and thin liquids, consider VBS  · Sputum culture/Strep pneumo/legionella pending  · Influenza swab negative  · Flonase, Advair inhaler, neb treatments   · Hold off on any steroid management for now pending pulmonary evaluation     Hypokalemia  Assessment & Plan  · K+ 3 1, improved to 3 6  · Will place on KDUR 40 mEq and IV potassium 20 mEq now   · Monitor BMP in the AM    Sepsis ([address] )  Assessment & Plan  · POA as evidenced by fever, tachycardia, leukocytosis with evidence of lower lobe pneumonia on CT scan, improving  · Blood cultures negative for 24 hours  · Continue IV ceftriaxone and p o   Flagyl, discontinued azithromycin  · IVF hydration    · Influenza swab negative  · Preliminary Sputum culture showing multiple organisms, will reincubate/urine antigens are negative  · Supportive care with antipyretics   · Continue to monitor temps/WBC closely     Benign essential HTN  Assessment & Plan  · BP appears stable on review  · Continue amlodipine 10 mg daily, lisinopril 20 mg daily and Toprol XL 25 mg daily   · Monitor      VTE Pharmacologic Prophylaxis:   Pharmacologic: Enoxaparin (Lovenox)  Mechanical VTE Prophylaxis in Place: Yes    Patient Centered Rounds: I have performed bedside rounds with nursing staff today  Discussions with Specialists or Other Care Team Provider:  Reviewed previous provider's notes, reviewed Pulmonary notes discussed with case management primary RN    Education and Discussions with Family / Patient:  Educated patient on current plan of care denies any additional questions or concerns at this time    Time Spent for Care: 20 minutes  More than 50% of total time spent on counseling and coordination of care as described above  Current Length of Stay: 2 day(s)    Current Patient Status: Inpatient   Certification Statement: The patient will continue to require additional inpatient hospital stay due to IV antibiotics    Discharge Plan / Estimated Discharge Date:  Likely tomorrow      Code Status: Level 1 - Full Code      Subjective:   Denies any chest pain chest tightness shortness of breath or difficulty breathing  Reports that he is feeling significantly better today  Encouraged patient to use incentive spirometer and ambulate the hallway    Objective:     Vitals:   Temp (24hrs), Av 7 °F (37 1 °C), Min:98 4 °F (36 9 °C), Max:98 9 °F (37 2 °C)    Temp:  [98 4 °F (36 9 °C)-98 9 °F (37 2 °C)] 98 4 °F (36 9 °C)  HR:  [55-86] 86  Resp:  [20] 20  BP: (134-146)/(77-93) 134/77  SpO2:  [93 %-97 %] 94 %  Body mass index is 31 01 kg/m²  Input and Output Summary (last 24 hours): Intake/Output Summary (Last 24 hours) at 2019 1502  Last data filed at 2019 1324  Gross per 24 hour   Intake 920 ml   Output    Net 920 ml     Physical Exam:     Physical Exam   Constitutional: He is oriented to person, place, and time  He appears well-developed and well-nourished  HENT:   Head: Normocephalic  Eyes: Pupils are equal, round, and reactive to light  Neck: Normal range of motion  Neck supple  Cardiovascular: Normal rate and regular rhythm  Pulmonary/Chest: Effort normal and breath sounds normal  No respiratory distress  Cough   Abdominal: Soft  Bowel sounds are normal    Musculoskeletal: Normal range of motion  Neurological: He is alert and oriented to person, place, and time  Skin: Skin is warm and dry  Psychiatric: He has a normal mood and affect  His behavior is normal  Judgment and thought content normal    Nursing note and vitals reviewed  Additional Data:     Labs:    Results from last 7 days   Lab Units 12/04/19  0500 12/03/19  0515   WBC Thousand/uL 8 86 11 43*   HEMOGLOBIN g/dL 15 8 14 2   HEMATOCRIT % 46 7 42 1   PLATELETS Thousands/uL 151 143*   NEUTROS PCT %  --  80*   LYMPHS PCT %  --  10*   MONOS PCT %  --  9   EOS PCT %  --  1     Results from last 7 days   Lab Units 12/04/19  0500  12/02/19  1611   POTASSIUM mmol/L 3 6   < > 3 3*   CHLORIDE mmol/L 107   < > 103   CO2 mmol/L 22   < > 23   BUN mg/dL 13   < > 20   CREATININE mg/dL 1 08   < > 1 25   CALCIUM mg/dL 8 3   < > 8 4   ALK PHOS U/L  --   --  126*   ALT U/L  --   --  24   AST U/L  --   --  26    < > = values in this interval not displayed  * I Have Reviewed All Lab Data Listed Above  * Additional Pertinent Lab Tests Reviewed: [facility] Admission Reviewed    Recent Cultures (last 7 days):     Results from last 7 days   Lab Units 12/03/19  0401 12/03/19  0015 12/02/19  1647 12/02/19  1611   BLOOD CULTURE   --   --  No Growth at 24 hrs  No Growth at 24 hrs     SPUTUM CULTURE  Culture too young- will reincubate  --   --   --    GRAM STAIN RESULT  1+ Polys*  1+ Epithelial Cells*  1+ Gram positive cocci in pairs*  1+ Gram negative diplococci*  1+ Gram negative rods*  --   --   --    LEGIONELLA URINARY ANTIGEN   --  Negative  --   --        Last 24 Hours Medication List:     Current Facility-Administered Medications:  acetaminophen 650 mg Oral Q6H PRN Ralph Tay PA-C    amLODIPine 10 mg Oral Daily Marlene Mendez PA-C    ascorbic acid 500 mg Oral Daily Katy Mendenhall PA-C    aspirin 325 mg Oral Early Morning Flores Tripbird Mendez PA-C    cefTRIAXone 1,000 mg Intravenous Q24H Katy Mendenhall PA-C Last Rate: 1,000 mg (12/03/19 2120)   enoxaparin 40 mg Subcutaneous Daily Marlene Mendez PA-C    fluticasone 1 spray Each Nare Daily Marlene Mendez PA-C    guaiFENesin 600 mg Oral BID Katy Mendenhall PA-C    ipratropium-albuterol 3 mL Nebulization TID Delma Butterfield DO    ketorolac 15 mg Intravenous Q6H PRN MALIKA Arrington    lisinopril 20 mg Oral Daily Marlene Mendez PA-C    metoprolol succinate 25 mg Oral Daily Marlene Mendez PA-C    metroNIDAZOLE 500 mg Oral Q8H [facility]    multivitamin-minerals 1 tablet Oral Daily Marlene Mendez PA-C    sodium chloride 75 mL/hr Intravenous Continuous Katy Mendenhall PA-C Last Rate: 75 mL/hr (12/04/19 1950)        Today, Patient Was Seen By: MALIKA Beckford    ** Please Note: Dragon 360 Dictation voice to text software may have been used in the creation of this document   ** Detail Level: Detailed

## 2022-08-17 ENCOUNTER — APPOINTMENT (OUTPATIENT)
Dept: LAB | Facility: HOSPITAL | Age: 57
End: 2022-08-17
Payer: COMMERCIAL

## 2022-08-17 DIAGNOSIS — Z11.59 ENCOUNTER FOR HEPATITIS C SCREENING TEST FOR LOW RISK PATIENT: ICD-10-CM

## 2022-08-17 DIAGNOSIS — Z12.5 SCREENING FOR PROSTATE CANCER: ICD-10-CM

## 2022-08-17 DIAGNOSIS — Z13.0 SCREENING FOR DEFICIENCY ANEMIA: ICD-10-CM

## 2022-08-17 DIAGNOSIS — Z13.220 SCREENING CHOLESTEROL LEVEL: ICD-10-CM

## 2022-08-17 DIAGNOSIS — Z00.8 HEALTH EXAMINATION IN POPULATION SURVEY: ICD-10-CM

## 2022-08-17 LAB
ALBUMIN SERPL BCP-MCNC: 3.8 G/DL (ref 3.5–5)
ALP SERPL-CCNC: 130 U/L (ref 46–116)
ALT SERPL W P-5'-P-CCNC: 18 U/L (ref 12–78)
ANION GAP SERPL CALCULATED.3IONS-SCNC: 8 MMOL/L (ref 4–13)
AST SERPL W P-5'-P-CCNC: 24 U/L (ref 5–45)
BILIRUB SERPL-MCNC: 0.34 MG/DL (ref 0.2–1)
BUN SERPL-MCNC: 20 MG/DL (ref 5–25)
CALCIUM SERPL-MCNC: 9 MG/DL (ref 8.3–10.1)
CHLORIDE SERPL-SCNC: 104 MMOL/L (ref 96–108)
CHOLEST SERPL-MCNC: 235 MG/DL
CO2 SERPL-SCNC: 27 MMOL/L (ref 21–32)
CREAT SERPL-MCNC: 2.22 MG/DL (ref 0.6–1.3)
ERYTHROCYTE [DISTWIDTH] IN BLOOD BY AUTOMATED COUNT: 12.5 % (ref 11.6–15.1)
GFR SERPL CREATININE-BSD FRML MDRD: 31 ML/MIN/1.73SQ M
GLUCOSE P FAST SERPL-MCNC: 93 MG/DL (ref 65–99)
HCT VFR BLD AUTO: 43.6 % (ref 36.5–49.3)
HDLC SERPL-MCNC: 43 MG/DL
HGB BLD-MCNC: 15.4 G/DL (ref 12–17)
LDLC SERPL CALC-MCNC: 153 MG/DL (ref 0–100)
MCH RBC QN AUTO: 32.2 PG (ref 26.8–34.3)
MCHC RBC AUTO-ENTMCNC: 35.3 G/DL (ref 31.4–37.4)
MCV RBC AUTO: 91 FL (ref 82–98)
NONHDLC SERPL-MCNC: 192 MG/DL
PLATELET # BLD AUTO: 191 THOUSANDS/UL (ref 149–390)
PMV BLD AUTO: 10.4 FL (ref 8.9–12.7)
POTASSIUM SERPL-SCNC: 3.5 MMOL/L (ref 3.5–5.3)
PROT SERPL-MCNC: 7.5 G/DL (ref 6.4–8.4)
PSA SERPL-MCNC: 0.6 NG/ML (ref 0–4)
RBC # BLD AUTO: 4.78 MILLION/UL (ref 3.88–5.62)
SODIUM SERPL-SCNC: 139 MMOL/L (ref 135–147)
TRIGL SERPL-MCNC: 196 MG/DL
WBC # BLD AUTO: 5.94 THOUSAND/UL (ref 4.31–10.16)

## 2022-08-17 PROCEDURE — 80053 COMPREHEN METABOLIC PANEL: CPT

## 2022-08-17 PROCEDURE — 80061 LIPID PANEL: CPT

## 2022-08-17 PROCEDURE — 83036 HEMOGLOBIN GLYCOSYLATED A1C: CPT

## 2022-08-17 PROCEDURE — 85027 COMPLETE CBC AUTOMATED: CPT

## 2022-08-17 PROCEDURE — 36415 COLL VENOUS BLD VENIPUNCTURE: CPT

## 2022-08-17 PROCEDURE — G0103 PSA SCREENING: HCPCS

## 2022-08-17 PROCEDURE — 86803 HEPATITIS C AB TEST: CPT

## 2022-08-18 LAB
EST. AVERAGE GLUCOSE BLD GHB EST-MCNC: 105 MG/DL
HBA1C MFR BLD: 5.3 %
HCV AB SER QL: NORMAL

## 2022-08-25 DIAGNOSIS — E86.0 DEHYDRATION: Primary | ICD-10-CM

## 2022-09-18 DIAGNOSIS — M54.50 CHRONIC BILATERAL LOW BACK PAIN WITHOUT SCIATICA: ICD-10-CM

## 2022-09-18 DIAGNOSIS — G89.29 CHRONIC BILATERAL LOW BACK PAIN WITHOUT SCIATICA: ICD-10-CM

## 2022-09-19 RX ORDER — DULOXETIN HYDROCHLORIDE 60 MG/1
60 CAPSULE, DELAYED RELEASE ORAL DAILY
Qty: 90 CAPSULE | Refills: 1 | Status: SHIPPED | OUTPATIENT
Start: 2022-09-19

## 2022-10-12 ENCOUNTER — OFFICE VISIT (OUTPATIENT)
Dept: BARIATRICS | Facility: CLINIC | Age: 57
End: 2022-10-12
Payer: COMMERCIAL

## 2022-10-12 VITALS
RESPIRATION RATE: 16 BRPM | BODY MASS INDEX: 31.13 KG/M2 | HEART RATE: 60 BPM | DIASTOLIC BLOOD PRESSURE: 70 MMHG | TEMPERATURE: 98.6 F | HEIGHT: 66 IN | WEIGHT: 193.7 LBS | SYSTOLIC BLOOD PRESSURE: 110 MMHG

## 2022-10-12 DIAGNOSIS — G47.33 OSA (OBSTRUCTIVE SLEEP APNEA): ICD-10-CM

## 2022-10-12 DIAGNOSIS — I10 PRIMARY HYPERTENSION: ICD-10-CM

## 2022-10-12 DIAGNOSIS — G89.29 CHRONIC BILATERAL LOW BACK PAIN WITHOUT SCIATICA: ICD-10-CM

## 2022-10-12 DIAGNOSIS — M54.50 CHRONIC BILATERAL LOW BACK PAIN WITHOUT SCIATICA: ICD-10-CM

## 2022-10-12 DIAGNOSIS — N18.32 STAGE 3B CHRONIC KIDNEY DISEASE (HCC): ICD-10-CM

## 2022-10-12 DIAGNOSIS — E66.09 CLASS 1 OBESITY DUE TO EXCESS CALORIES WITH SERIOUS COMORBIDITY AND BODY MASS INDEX (BMI) OF 31.0 TO 31.9 IN ADULT: Primary | ICD-10-CM

## 2022-10-12 PROBLEM — E66.811 CLASS 1 OBESITY DUE TO EXCESS CALORIES WITH SERIOUS COMORBIDITY AND BODY MASS INDEX (BMI) OF 31.0 TO 31.9 IN ADULT: Status: ACTIVE | Noted: 2022-10-12

## 2022-10-12 PROCEDURE — 99203 OFFICE O/P NEW LOW 30 MIN: CPT | Performed by: FAMILY MEDICINE
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
## 2022-10-27 ENCOUNTER — OFFICE VISIT (OUTPATIENT)
Dept: FAMILY MEDICINE CLINIC | Facility: CLINIC | Age: 57
End: 2022-10-27
Payer: COMMERCIAL

## 2022-10-27 VITALS
WEIGHT: 193 LBS | HEIGHT: 66 IN | SYSTOLIC BLOOD PRESSURE: 127 MMHG | HEART RATE: 66 BPM | TEMPERATURE: 97 F | OXYGEN SATURATION: 97 % | DIASTOLIC BLOOD PRESSURE: 90 MMHG | BODY MASS INDEX: 31.02 KG/M2 | RESPIRATION RATE: 16 BRPM

## 2022-10-27 DIAGNOSIS — L98.9 SKIN LESION: ICD-10-CM

## 2022-10-27 DIAGNOSIS — M47.816 LUMBAR SPONDYLOSIS: Primary | ICD-10-CM

## 2022-10-27 DIAGNOSIS — J06.9 UPPER RESPIRATORY TRACT INFECTION, UNSPECIFIED TYPE: ICD-10-CM

## 2022-10-27 DIAGNOSIS — I10 BENIGN ESSENTIAL HTN: ICD-10-CM

## 2022-10-27 PROCEDURE — 99214 OFFICE O/P EST MOD 30 MIN: CPT | Performed by: FAMILY MEDICINE

## 2022-10-27 RX ORDER — BENZONATATE 100 MG/1
100 CAPSULE ORAL 3 TIMES DAILY PRN
Qty: 20 CAPSULE | Refills: 0 | Status: SHIPPED | OUTPATIENT
Start: 2022-10-27

## 2022-10-27 NOTE — ASSESSMENT & PLAN NOTE
Patient's goal blood pressure should be less than 140/90  Initial blood pressure in the office 130/94  Blood pressure recheck 127/90  Patient has been missing his antihypertensive medication due to recent URI symptoms  Encourage patient to restart medications

## 2022-10-27 NOTE — ASSESSMENT & PLAN NOTE
Baseline back pain, will controlled on Cymbalta 90 mg daily  Continue to monitor encourage patient to participate in physical activity a weight loss which may help improve lumbar back pain

## 2022-10-27 NOTE — ASSESSMENT & PLAN NOTE
Likely benign  Continue to monitor for changes such as pain, increasing size, discoloration  If there is significant change please return for evaluation/removal or make appointment with Dermatology for skin check

## 2022-10-27 NOTE — PROGRESS NOTES
Name: Yolanda Ashley      : 1965      MRN: [id number]  Encounter Provider: Pankaj Cowan MD  Encounter Date: 10/27/2022   Encounter department: [address] [place] [address]  Lumbar spondylosis  Assessment & Plan:  Baseline back pain, will controlled on Cymbalta 90 mg daily  Continue to monitor encourage patient to participate in physical activity a weight loss which may help improve lumbar back pain      2  Upper respiratory tract infection, unspecified type  -     benzonatate (TESSALON PERLES) 100 mg capsule; Take 1 capsule (100 mg total) by mouth 3 (three) times a day as needed for cough    3  Skin lesion  Assessment & Plan:  Likely benign  Continue to monitor for changes such as pain, increasing size, discoloration  If there is significant change please return for evaluation/removal or make appointment with Dermatology for skin check      4  Benign essential HTN  Assessment & Plan:  Patient's goal blood pressure should be less than 140/90  Initial blood pressure in the office 130/94  Blood pressure recheck 127/90  Patient has been missing his antihypertensive medication due to recent URI symptoms  Encourage patient to restart medications           Subjective      HPI     80-year-old male patient presents for follow-up regarding his chronic medical conditions  Patient was last seen on 2022, at that time patient reports improvement his lumbar back pain from Cymbalta and the dosage was increased from 60 mg to 90 mg a day  Patient reports satisfaction in regards to pain control for his lumbar spine, reports are still persistent aches but denies significant pain  Patient has also noticed a small skin lesion on the scalp, denies any significant changes to the lesion  Has been stable for about 2-3 months  He also have mildly elevated blood pressure today, 130/94    Reports he has been experiencing upper respiratory symptoms for the last 2 weeks and has forgot his medication while he was sick  Patient reports overall improvement his respiratory symptoms despite mild persistent cough  He has been experiencing some pounding sensation in his head without severe headache  Review of Systems   Constitutional: Negative for chills and fever  HENT: Positive for congestion, rhinorrhea and sore throat  Respiratory: Positive for cough  Negative for shortness of breath  Cardiovascular: Negative for chest pain  Gastrointestinal: Negative for abdominal pain  Neurological: Positive for headaches  Negative for dizziness and light-headedness  Psychiatric/Behavioral: Negative for sleep disturbance  Current Outpatient Medications on File Prior to Visit   Medication Sig   • amLODIPine (NORVASC) 10 mg tablet Take 1 tablet (10 mg total) by mouth daily   • Ascorbic Acid (VITAMIN C) 500 MG CAPS Take 500 mg by mouth daily in the early morning     • aspirin 325 mg tablet Take 81 mg by mouth daily in the early morning    • DULoxetine (CYMBALTA) 30 mg delayed release capsule TAKE 30MG DAILY IN ADDITION TO 60MG CAP  (90MG/DAY TOTAL)   • DULoxetine (CYMBALTA) 60 mg delayed release capsule Take 1 capsule (60 mg total) by mouth daily Take in addition to 30 mg capsule (total 90 mg) daily   • lisinopril (ZESTRIL) 20 mg tablet Take 1 tablet (20 mg total) by mouth daily   • metoprolol succinate (TOPROL-XL) 25 mg 24 hr tablet Take 1 tablet (25 mg total) by mouth daily   • Multiple Vitamins-Minerals (MULTIVITAMIN ADULT PO) QD in AM   • NON FORMULARY CBD gummies q hs prn (Patient not taking: Reported on 10/27/2022)   • tiZANidine (ZANAFLEX) 4 mg tablet Take 1 tablet (4 mg total) by mouth in the morning and 1 tablet (4 mg total) in the evening   (Patient not taking: Reported on 10/27/2022)     Objective     /94 (BP Location: Right arm, Patient Position: Sitting, Cuff Size: Large)   Pulse 66   Temp (!) 97 °F (36 1 °C) (Temporal)   Resp 16   Ht 5' 5 5" (1 664 m)   Wt 87 5 kg (193 lb)   SpO2 97%   BMI 31 63 kg/m²     Physical Exam  Vitals reviewed  Constitutional:       General: He is not in acute distress  Appearance: Normal appearance  He is obese  He is not ill-appearing, toxic-appearing or diaphoretic  Cardiovascular:      Rate and Rhythm: Normal rate and regular rhythm  Pulses: Normal pulses  Heart sounds: Normal heart sounds  No murmur heard  Pulmonary:      Effort: Pulmonary effort is normal  No respiratory distress  Breath sounds: Normal breath sounds  Comments: Multiple coughing episodes  Abdominal:      General: Abdomen is flat  Bowel sounds are normal  There is no distension  Palpations: Abdomen is soft  Musculoskeletal:         General: No swelling  Normal range of motion  Skin:     General: Skin is warm and dry  Capillary Refill: Capillary refill takes less than 2 seconds  Coloration: Skin is not jaundiced  Comments: Small isolated fleshy color nodule, less than 0 3 cm in diameter, round well-circumscribed lesion on the top of the scalp   Neurological:      General: No focal deficit present  Mental Status: He is alert     Psychiatric:         Mood and Affect: Mood normal               Ridge Pope MD

## 2022-11-17 DIAGNOSIS — G89.29 CHRONIC BILATERAL LOW BACK PAIN WITHOUT SCIATICA: ICD-10-CM

## 2022-11-17 DIAGNOSIS — M54.50 CHRONIC BILATERAL LOW BACK PAIN WITHOUT SCIATICA: ICD-10-CM

## 2022-11-18 RX ORDER — DULOXETIN HYDROCHLORIDE 30 MG/1
CAPSULE, DELAYED RELEASE ORAL
Qty: 90 CAPSULE | Refills: 1 | Status: SHIPPED | OUTPATIENT
Start: 2022-11-18

## 2022-11-28 ENCOUNTER — TELEPHONE (OUTPATIENT)
Dept: BARIATRICS | Facility: CLINIC | Age: 57
End: 2022-11-28

## 2022-11-29 DIAGNOSIS — I10 HYPERTENSION, UNSPECIFIED TYPE: ICD-10-CM

## 2022-11-29 DIAGNOSIS — M62.830 LUMBAR PARASPINAL MUSCLE SPASM: ICD-10-CM

## 2022-11-29 DIAGNOSIS — M54.50 CHRONIC BILATERAL LOW BACK PAIN WITHOUT SCIATICA: ICD-10-CM

## 2022-11-29 DIAGNOSIS — G89.29 CHRONIC BILATERAL LOW BACK PAIN WITHOUT SCIATICA: ICD-10-CM

## 2022-11-30 DIAGNOSIS — I10 HYPERTENSION, UNSPECIFIED TYPE: ICD-10-CM

## 2022-11-30 RX ORDER — TIZANIDINE 4 MG/1
4 TABLET ORAL 2 TIMES DAILY
Qty: 60 TABLET | Refills: 2 | Status: SHIPPED | OUTPATIENT
Start: 2022-11-30

## 2022-11-30 RX ORDER — AMLODIPINE BESYLATE 10 MG/1
10 TABLET ORAL DAILY
Qty: 90 TABLET | Refills: 1 | Status: SHIPPED | OUTPATIENT
Start: 2022-11-30

## 2022-11-30 RX ORDER — METOPROLOL SUCCINATE 25 MG/1
25 TABLET, EXTENDED RELEASE ORAL DAILY
Qty: 90 TABLET | Refills: 1 | Status: SHIPPED | OUTPATIENT
Start: 2022-11-30

## 2022-11-30 RX ORDER — LISINOPRIL 20 MG/1
20 TABLET ORAL DAILY
Qty: 90 TABLET | Refills: 1 | Status: SHIPPED | OUTPATIENT
Start: 2022-11-30

## 2022-12-08 ENCOUNTER — OFFICE VISIT (OUTPATIENT)
Dept: URGENT CARE | Facility: CLINIC | Age: 57
End: 2022-12-08

## 2022-12-08 VITALS
TEMPERATURE: 97.3 F | HEART RATE: 77 BPM | BODY MASS INDEX: 31.63 KG/M2 | DIASTOLIC BLOOD PRESSURE: 100 MMHG | SYSTOLIC BLOOD PRESSURE: 160 MMHG | WEIGHT: 193 LBS | OXYGEN SATURATION: 95 %

## 2022-12-08 DIAGNOSIS — G89.29 CHRONIC BILATERAL LOW BACK PAIN WITHOUT SCIATICA: ICD-10-CM

## 2022-12-08 DIAGNOSIS — M62.830 LUMBAR PARASPINAL MUSCLE SPASM: ICD-10-CM

## 2022-12-08 DIAGNOSIS — M54.50 CHRONIC BILATERAL LOW BACK PAIN WITHOUT SCIATICA: ICD-10-CM

## 2022-12-08 DIAGNOSIS — R05.1 ACUTE COUGH: Primary | ICD-10-CM

## 2022-12-08 DIAGNOSIS — R03.0 ELEVATED BLOOD PRESSURE READING: ICD-10-CM

## 2022-12-08 DIAGNOSIS — I10 HYPERTENSION, UNSPECIFIED TYPE: ICD-10-CM

## 2022-12-08 DIAGNOSIS — B34.9 VIRAL INFECTION: ICD-10-CM

## 2022-12-08 NOTE — PATIENT INSTRUCTIONS
Take your blood pressure medications as prescribed and monitor your BP  If still elevated or any worsening of symptoms or new symptoms develop then proceed to the ER immediately  Otherwise follow up with your pcp tomorrow  Swab results will be available in 1-2 days
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
## 2022-12-09 LAB
FLUAV RNA RESP QL NAA+PROBE: NEGATIVE
FLUBV RNA RESP QL NAA+PROBE: NEGATIVE
SARS-COV-2 RNA RESP QL NAA+PROBE: NEGATIVE

## 2022-12-12 RX ORDER — DULOXETIN HYDROCHLORIDE 30 MG/1
CAPSULE, DELAYED RELEASE ORAL
Qty: 90 CAPSULE | Refills: 1 | Status: SHIPPED | OUTPATIENT
Start: 2022-12-12

## 2022-12-12 RX ORDER — AMLODIPINE BESYLATE 10 MG/1
10 TABLET ORAL DAILY
Qty: 90 TABLET | Refills: 1 | Status: SHIPPED | OUTPATIENT
Start: 2022-12-12

## 2022-12-12 RX ORDER — TIZANIDINE 4 MG/1
4 TABLET ORAL 2 TIMES DAILY
Qty: 60 TABLET | Refills: 2 | Status: SHIPPED | OUTPATIENT
Start: 2022-12-12

## 2022-12-12 RX ORDER — METOPROLOL SUCCINATE 25 MG/1
25 TABLET, EXTENDED RELEASE ORAL DAILY
Qty: 90 TABLET | Refills: 1 | Status: SHIPPED | OUTPATIENT
Start: 2022-12-12

## 2022-12-21 DIAGNOSIS — I10 HYPERTENSION, UNSPECIFIED TYPE: ICD-10-CM

## 2022-12-21 RX ORDER — LISINOPRIL 20 MG/1
20 TABLET ORAL DAILY
Qty: 90 TABLET | Refills: 1 | OUTPATIENT
Start: 2022-12-21

## 2023-03-27 DIAGNOSIS — G89.29 CHRONIC BILATERAL LOW BACK PAIN WITHOUT SCIATICA: ICD-10-CM

## 2023-03-27 DIAGNOSIS — M54.50 CHRONIC BILATERAL LOW BACK PAIN WITHOUT SCIATICA: ICD-10-CM

## 2023-03-27 RX ORDER — DULOXETIN HYDROCHLORIDE 60 MG/1
60 CAPSULE, DELAYED RELEASE ORAL DAILY
Qty: 90 CAPSULE | Refills: 1 | Status: SHIPPED | OUTPATIENT
Start: 2023-03-27

## 2023-07-17 DIAGNOSIS — I10 HYPERTENSION, UNSPECIFIED TYPE: ICD-10-CM

## 2023-07-17 RX ORDER — LISINOPRIL 20 MG/1
TABLET ORAL
Qty: 90 TABLET | Refills: 1 | Status: SHIPPED | OUTPATIENT
Start: 2023-07-17 | End: 2023-07-18 | Stop reason: SDUPTHER

## 2023-07-18 ENCOUNTER — TELEPHONE (OUTPATIENT)
Dept: FAMILY MEDICINE CLINIC | Facility: CLINIC | Age: 58
End: 2023-07-18

## 2023-07-18 DIAGNOSIS — I10 HYPERTENSION, UNSPECIFIED TYPE: ICD-10-CM

## 2023-07-18 RX ORDER — LISINOPRIL 20 MG/1
20 TABLET ORAL DAILY
Qty: 30 TABLET | Refills: 1 | Status: SHIPPED | OUTPATIENT
Start: 2023-07-18

## 2023-08-31 NOTE — ASSESSMENT & PLAN NOTE
· K+ 3 1, improved to 3 6  · Will place on KDUR 40 mEq and IV potassium 20 mEq now   · Monitor BMP in the AM asymptomatic

## 2024-01-11 DIAGNOSIS — I10 HYPERTENSION, UNSPECIFIED TYPE: ICD-10-CM

## 2024-01-11 RX ORDER — LISINOPRIL 20 MG/1
20 TABLET ORAL DAILY
Qty: 90 TABLET | Refills: 0 | Status: SHIPPED | OUTPATIENT
Start: 2024-01-11

## 2024-02-06 ENCOUNTER — TELEPHONE (OUTPATIENT)
Age: 59
End: 2024-02-06

## 2024-02-06 NOTE — TELEPHONE ENCOUNTER
Florence from [facility] pharmacy patient needs a refill on Tizanidine. Advised patient needs to schedule appointment has not been seen in office since 10/2022. States will have patient call to schedule appointment

## 2024-04-07 DIAGNOSIS — I10 HYPERTENSION, UNSPECIFIED TYPE: ICD-10-CM

## 2024-04-09 RX ORDER — LISINOPRIL 20 MG/1
20 TABLET ORAL DAILY
Qty: 90 TABLET | Refills: 0 | Status: SHIPPED | OUTPATIENT
Start: 2024-04-09

## 2024-07-09 DIAGNOSIS — I10 HYPERTENSION, UNSPECIFIED TYPE: ICD-10-CM

## 2024-07-09 RX ORDER — LISINOPRIL 20 MG/1
20 TABLET ORAL DAILY
Qty: 1 TABLET | Refills: 0 | Status: SHIPPED | OUTPATIENT
Start: 2024-07-09 | End: 2024-07-11

## 2024-07-11 RX ORDER — LISINOPRIL 20 MG/1
20 TABLET ORAL DAILY
Qty: 30 TABLET | Refills: 0 | Status: SHIPPED | OUTPATIENT
Start: 2024-07-11

## 2024-08-13 DIAGNOSIS — I10 HYPERTENSION, UNSPECIFIED TYPE: ICD-10-CM

## 2024-08-14 RX ORDER — LISINOPRIL 20 MG/1
20 TABLET ORAL DAILY
Qty: 90 TABLET | Refills: 1 | Status: SHIPPED | OUTPATIENT
Start: 2024-08-14

## 2024-08-14 NOTE — TELEPHONE ENCOUNTER
Refill must be reviewed and completed by the office or provider. The refill is unable to be approved or denied by the medication management team.      Last seen 10.2022, no appt - Please review to see if the refill is appropriate.

## 2024-10-29 ENCOUNTER — TELEPHONE (OUTPATIENT)
Age: 59
End: 2024-10-29

## 2024-10-29 NOTE — TELEPHONE ENCOUNTER
Caller: calin    Doctor: ghazal    Reason for call: pt hasn't been seen in 2 yrs so I made him a f/u appt but he would like to get his ablation set up asap. Please advise.    Call back#: [phone]

## 2025-05-05 NOTE — TELEPHONE ENCOUNTER
Aware and agree with plan 
Call pt 6/24 s/p RT L3-5 RFA  Confirm next procedure appt 7/7 with 7:50 arrival for LT HIP Inj
RN attempted to reach pt regarding previous  [facility]MLOM with c/b number office hours and location provided 
S/W pt  Pt stated needle sites look good, denies S&S of infection, denies fevers, denies soreness and denies sun burn like sensation  Advised pt if he does get pain to take his prescribed or OTC pain medications and/or use ice/heat and that it takes 4 to 6 weeks to see the full effect  Confirmed next appt w/ pt  Pt verbalized understanding 
Detail Level: Detailed
General Sunscreen Counseling: I recommended a broad spectrum sunscreen with a SPF of 30 or higher.  I explained that SPF 30 sunscreens block approximately 97 percent of the sun's harmful rays.  Sunscreens should be applied at least 15 minutes prior to expected sun exposure and then every 2 hours after that as long as sun exposure continues. If swimming or exercising sunscreen should be reapplied every 45 minutes to an hour after getting wet or sweating.  One ounce, or the equivalent of a shot glass full of sunscreen, is adequate to protect the skin not covered by a bathing suit. I also recommended a lip balm with a sunscreen as well. Sun protective clothing can be used in lieu of sunscreen but must be worn the entire time you are exposed to the sun's rays.

## (undated) DEVICE — PROBE ABLATION  APOLLO RF 90 DEG MULTI PORT

## (undated) DEVICE — ADHESIVE SKN CLSR HISTOACRYL FLEX 0.5ML LF

## (undated) DEVICE — BLADE SHAVER TORPEDO 4MM 13CM  COOLCUT

## (undated) DEVICE — THREADED CLEAR CANNULA WITH OBTURATOR 7MM X 75MM

## (undated) DEVICE — MEDI-VAC YANKAUER SUCTION HANDLE W/STRAIGHT TIP & CONTROL VENT: Brand: CARDINAL HEALTH

## (undated) DEVICE — GAUZE SPONGES,16 PLY: Brand: CURITY

## (undated) DEVICE — PACK UNIVERSAL ARTHRSCOPY PBDS

## (undated) DEVICE — 3M™ IOBAN™ 2 ANTIMICROBIAL INCISE DRAPE 6650EZ: Brand: IOBAN™ 2

## (undated) DEVICE — GLOVE INDICATOR PI UNDERGLOVE SZ 7.5 BLUE

## (undated) DEVICE — TIBURON BEACH CHAIR SHOULDER DRAPE: Brand: CONVERTORS

## (undated) DEVICE — INTENDED FOR TISSUE SEPARATION, AND OTHER PROCEDURES THAT REQUIRE A SHARP SURGICAL BLADE TO PUNCTURE OR CUT.: Brand: BARD-PARKER SAFETY BLADES SIZE 11, STERILE

## (undated) DEVICE — SPONGE PVP SCRUB WING STERILE

## (undated) DEVICE — DISPOSABLE EQUIPMENT COVER: Brand: SMALL TOWEL DRAPE

## (undated) DEVICE — SUT ETHILON 3-0 PS-1 18 IN 1663H

## (undated) DEVICE — DRESSING MEPILEX AG BORDER 4 X 4 IN

## (undated) DEVICE — ARTHROSCOPY FLOOR MAT

## (undated) DEVICE — U-DRAPE: Brand: CONVERTORS

## (undated) DEVICE — DRESSING MEPILEX AG BORDER 4 X 8 IN

## (undated) DEVICE — THREADED CLEAR CANNULA WITH OBTURATOR 8.5MM X 75MM

## (undated) DEVICE — GLOVE INDICATOR PI UNDERGLOVE SZ 9 BLUE

## (undated) DEVICE — TUBING SUCTION 5MM X 12 FT

## (undated) DEVICE — VAPR COOLPULSE 90 ELECTRODE 90 DEGREES SUCTION WITH INTEGRATED HANDPIECE: Brand: VAPR COOLPULSE

## (undated) DEVICE — LIGHT HANDLE COVER SLEEVE DISP BLUE STELLAR

## (undated) DEVICE — BUTTON SWITCH PENCIL HOLSTER: Brand: VALLEYLAB

## (undated) DEVICE — TUBING ARTHROSCOPIC WAVE  MAIN PUMP

## (undated) DEVICE — ASTOUND SURGICAL GOWN, XXX LARGE, X-LONG: Brand: CONVERTORS

## (undated) DEVICE — SUT MONOCRYL 4-0 PS-2 18 IN Y496G

## (undated) DEVICE — TUBING ARTHROSCOPY REDUCE PUMP

## (undated) DEVICE — SCD SEQUENTIAL COMPRESSION COMFORT SLEEVE MEDIUM KNEE LENGTH: Brand: KENDALL SCD

## (undated) DEVICE — CHLORAPREP HI-LITE 26ML ORANGE

## (undated) DEVICE — CANNULA 7 X70MM THRD SEAL SIDE PORT

## (undated) DEVICE — IMPERVIOUS STOCKINETTE: Brand: DEROYAL

## (undated) DEVICE — SHOULDER SUSPENSION KIT 6 PER BOX

## (undated) DEVICE — DRAPE SHEET THREE QUARTER

## (undated) DEVICE — INTENDED FOR TISSUE SEPARATION, AND OTHER PROCEDURES THAT REQUIRE A SHARP SURGICAL BLADE TO PUNCTURE OR CUT.: Brand: BARD-PARKER ® CARBON RIB-BACK BLADES

## (undated) DEVICE — TUBING ARTHROSCOPY REDUCE PATIENT

## (undated) DEVICE — GLOVE SRG BIOGEL 7.5

## (undated) DEVICE — SHOULDER STABILIZATION KIT,                                    DISPOSABLE 12 PER BOX

## (undated) DEVICE — IDEAL SUTURE SHUTTLE, 45 DEGREES RIGHT: Brand: IDEAL

## (undated) DEVICE — PACK SHOULDER ARTHROSCOPY PBDS

## (undated) DEVICE — Device

## (undated) DEVICE — SUT PDS II 1 CT-1 27 IN Z341H

## (undated) DEVICE — GLOVE SRG BIOGEL 9

## (undated) DEVICE — OCCLUSIVE GAUZE STRIP,3% BISMUTH TRIBROMOPHENATE IN PETROLATUM BLEND: Brand: XEROFORM

## (undated) DEVICE — ABDOMINAL PAD: Brand: DERMACEA

## (undated) DEVICE — 3M™ IOBAN™ 2 ANTIMICROBIAL INCISE DRAPE 6640EZ: Brand: IOBAN™ 2

## (undated) DEVICE — BLADE SHAVER CUTTER BN 4MM 13CM COOLCUT